# Patient Record
Sex: MALE | Race: WHITE | NOT HISPANIC OR LATINO | Employment: OTHER | URBAN - METROPOLITAN AREA
[De-identification: names, ages, dates, MRNs, and addresses within clinical notes are randomized per-mention and may not be internally consistent; named-entity substitution may affect disease eponyms.]

---

## 2018-03-24 ENCOUNTER — HOSPITAL ENCOUNTER (OUTPATIENT)
Facility: HOSPITAL | Age: 48
Discharge: HOME | End: 2018-03-25
Attending: EMERGENCY MEDICINE | Admitting: ORTHOPAEDIC SURGERY
Payer: COMMERCIAL

## 2018-03-24 DIAGNOSIS — S82.899A ANKLE FRACTURE: ICD-10-CM

## 2018-03-24 DIAGNOSIS — M25.571 ANKLE PAIN, RIGHT: Primary | ICD-10-CM

## 2018-03-24 DIAGNOSIS — S82.831A OTHER CLOSED FRACTURE OF PROXIMAL END OF RIGHT FIBULA, INITIAL ENCOUNTER: ICD-10-CM

## 2018-03-24 PROCEDURE — 82962 GLUCOSE BLOOD TEST: CPT

## 2018-03-24 PROCEDURE — 96374 THER/PROPH/DIAG INJ IV PUSH: CPT

## 2018-03-24 PROCEDURE — 96375 TX/PRO/DX INJ NEW DRUG ADDON: CPT

## 2018-03-24 PROCEDURE — 99285 EMERGENCY DEPT VISIT HI MDM: CPT | Mod: 25

## 2018-03-24 PROCEDURE — 63600175 PHARM REV CODE 636 W HCPCS: Performed by: EMERGENCY MEDICINE

## 2018-03-24 PROCEDURE — 27818 TREATMENT OF ANKLE FRACTURE: CPT

## 2018-03-24 PROCEDURE — 96361 HYDRATE IV INFUSION ADD-ON: CPT

## 2018-03-24 PROCEDURE — 25000003 PHARM REV CODE 250: Performed by: EMERGENCY MEDICINE

## 2018-03-24 RX ORDER — PROPOFOL 10 MG/ML
80 VIAL (ML) INTRAVENOUS ONCE
Status: COMPLETED | OUTPATIENT
Start: 2018-03-24 | End: 2018-03-24

## 2018-03-24 RX ORDER — LIDOCAINE HYDROCHLORIDE 10 MG/ML
20 INJECTION INFILTRATION; PERINEURAL
Status: COMPLETED | OUTPATIENT
Start: 2018-03-24 | End: 2018-03-24

## 2018-03-24 RX ADMIN — PROPOFOL 80 MG: 10 INJECTION, EMULSION INTRAVENOUS at 10:03

## 2018-03-24 RX ADMIN — LIDOCAINE HYDROCHLORIDE 20 ML: 10 INJECTION, SOLUTION INFILTRATION; PERINEURAL at 11:03

## 2018-03-25 VITALS
SYSTOLIC BLOOD PRESSURE: 135 MMHG | HEIGHT: 69 IN | BODY MASS INDEX: 26.66 KG/M2 | DIASTOLIC BLOOD PRESSURE: 81 MMHG | TEMPERATURE: 99 F | WEIGHT: 180 LBS | HEART RATE: 74 BPM | RESPIRATION RATE: 16 BRPM | OXYGEN SATURATION: 100 %

## 2018-03-25 PROBLEM — M25.571 ANKLE PAIN, RIGHT: Status: ACTIVE | Noted: 2018-03-25

## 2018-03-25 LAB
ANION GAP SERPL CALC-SCNC: 13 MMOL/L
BASOPHILS # BLD AUTO: 0.04 K/UL
BASOPHILS NFR BLD: 0.4 %
BUN SERPL-MCNC: 7 MG/DL
CALCIUM SERPL-MCNC: 9.1 MG/DL
CHLORIDE SERPL-SCNC: 109 MMOL/L
CO2 SERPL-SCNC: 22 MMOL/L
CREAT SERPL-MCNC: 0.9 MG/DL
DIFFERENTIAL METHOD: ABNORMAL
EOSINOPHIL # BLD AUTO: 0.2 K/UL
EOSINOPHIL NFR BLD: 1.7 %
ERYTHROCYTE [DISTWIDTH] IN BLOOD BY AUTOMATED COUNT: 14.8 %
EST. GFR  (AFRICAN AMERICAN): >60 ML/MIN/1.73 M^2
EST. GFR  (NON AFRICAN AMERICAN): >60 ML/MIN/1.73 M^2
GLUCOSE SERPL-MCNC: 96 MG/DL
HCT VFR BLD AUTO: 43.6 %
HGB BLD-MCNC: 14.3 G/DL
HIV1+2 IGG SERPL QL IA.RAPID: NEGATIVE
LACTATE SERPL-SCNC: 2.3 MMOL/L
LYMPHOCYTES # BLD AUTO: 3.3 K/UL
LYMPHOCYTES NFR BLD: 30.2 %
MCH RBC QN AUTO: 28.6 PG
MCHC RBC AUTO-ENTMCNC: 32.8 G/DL
MCV RBC AUTO: 87 FL
MONOCYTES # BLD AUTO: 0.6 K/UL
MONOCYTES NFR BLD: 5.8 %
NEUTROPHILS # BLD AUTO: 6.7 K/UL
NEUTROPHILS NFR BLD: 61.6 %
PLATELET # BLD AUTO: 190 K/UL
PMV BLD AUTO: 9.4 FL
POCT GLUCOSE: 78 MG/DL (ref 70–110)
POTASSIUM SERPL-SCNC: 4.2 MMOL/L
RBC # BLD AUTO: 5 M/UL
SODIUM SERPL-SCNC: 144 MMOL/L
WBC # BLD AUTO: 10.77 K/UL

## 2018-03-25 PROCEDURE — G0378 HOSPITAL OBSERVATION PER HR: HCPCS

## 2018-03-25 PROCEDURE — 85025 COMPLETE CBC W/AUTO DIFF WBC: CPT

## 2018-03-25 PROCEDURE — 97161 PT EVAL LOW COMPLEX 20 MIN: CPT

## 2018-03-25 PROCEDURE — 83605 ASSAY OF LACTIC ACID: CPT

## 2018-03-25 PROCEDURE — 97116 GAIT TRAINING THERAPY: CPT

## 2018-03-25 PROCEDURE — 80048 BASIC METABOLIC PNL TOTAL CA: CPT

## 2018-03-25 PROCEDURE — 25000003 PHARM REV CODE 250: Performed by: EMERGENCY MEDICINE

## 2018-03-25 PROCEDURE — G8987 SELF CARE CURRENT STATUS: HCPCS | Mod: CJ

## 2018-03-25 PROCEDURE — 86703 HIV-1/HIV-2 1 RESULT ANTBDY: CPT

## 2018-03-25 PROCEDURE — G8988 SELF CARE GOAL STATUS: HCPCS | Mod: CJ

## 2018-03-25 PROCEDURE — 63600175 PHARM REV CODE 636 W HCPCS: Performed by: EMERGENCY MEDICINE

## 2018-03-25 PROCEDURE — G8989 SELF CARE D/C STATUS: HCPCS | Mod: CJ

## 2018-03-25 PROCEDURE — 80074 ACUTE HEPATITIS PANEL: CPT

## 2018-03-25 PROCEDURE — 25000003 PHARM REV CODE 250: Performed by: STUDENT IN AN ORGANIZED HEALTH CARE EDUCATION/TRAINING PROGRAM

## 2018-03-25 PROCEDURE — 97165 OT EVAL LOW COMPLEX 30 MIN: CPT

## 2018-03-25 RX ORDER — SODIUM CHLORIDE, SODIUM LACTATE, POTASSIUM CHLORIDE, CALCIUM CHLORIDE 600; 310; 30; 20 MG/100ML; MG/100ML; MG/100ML; MG/100ML
INJECTION, SOLUTION INTRAVENOUS CONTINUOUS
Status: DISCONTINUED | OUTPATIENT
Start: 2018-03-25 | End: 2018-03-25 | Stop reason: HOSPADM

## 2018-03-25 RX ORDER — MORPHINE SULFATE 2 MG/ML
4 INJECTION, SOLUTION INTRAMUSCULAR; INTRAVENOUS EVERY 4 HOURS PRN
Status: DISCONTINUED | OUTPATIENT
Start: 2018-03-25 | End: 2018-03-25

## 2018-03-25 RX ORDER — SODIUM CHLORIDE 9 MG/ML
1000 INJECTION, SOLUTION INTRAVENOUS
Status: COMPLETED | OUTPATIENT
Start: 2018-03-25 | End: 2018-03-25

## 2018-03-25 RX ORDER — MORPHINE SULFATE 4 MG/ML
4 INJECTION, SOLUTION INTRAMUSCULAR; INTRAVENOUS EVERY 4 HOURS PRN
Status: DISCONTINUED | OUTPATIENT
Start: 2018-03-25 | End: 2018-03-25 | Stop reason: HOSPADM

## 2018-03-25 RX ORDER — FAMOTIDINE 20 MG/1
20 TABLET, FILM COATED ORAL 2 TIMES DAILY
Qty: 84 TABLET | Refills: 0 | Status: SHIPPED | OUTPATIENT
Start: 2018-03-25 | End: 2018-08-14

## 2018-03-25 RX ORDER — FAMOTIDINE 20 MG/1
20 TABLET, FILM COATED ORAL 2 TIMES DAILY
Status: DISCONTINUED | OUTPATIENT
Start: 2018-03-25 | End: 2018-03-25 | Stop reason: HOSPADM

## 2018-03-25 RX ORDER — ONDANSETRON 2 MG/ML
4 INJECTION INTRAMUSCULAR; INTRAVENOUS EVERY 8 HOURS PRN
Status: DISCONTINUED | OUTPATIENT
Start: 2018-03-25 | End: 2018-03-25 | Stop reason: HOSPADM

## 2018-03-25 RX ORDER — OXYCODONE AND ACETAMINOPHEN 5; 325 MG/1; MG/1
1 TABLET ORAL EVERY 4 HOURS PRN
Qty: 42 TABLET | Refills: 0 | Status: SHIPPED | OUTPATIENT
Start: 2018-03-25 | End: 2018-04-01

## 2018-03-25 RX ORDER — ASPIRIN 81 MG/1
81 TABLET ORAL 2 TIMES DAILY
Qty: 84 TABLET | Refills: 0 | Status: SHIPPED | OUTPATIENT
Start: 2018-03-25 | End: 2018-08-14

## 2018-03-25 RX ORDER — IBUPROFEN 200 MG
1 TABLET ORAL
Status: ACTIVE | OUTPATIENT
Start: 2018-03-25 | End: 2018-03-25

## 2018-03-25 RX ORDER — ENOXAPARIN SODIUM 100 MG/ML
40 INJECTION SUBCUTANEOUS EVERY 24 HOURS
Status: DISCONTINUED | OUTPATIENT
Start: 2018-03-25 | End: 2018-03-25 | Stop reason: HOSPADM

## 2018-03-25 RX ORDER — HYDROCODONE BITARTRATE AND ACETAMINOPHEN 5; 325 MG/1; MG/1
1 TABLET ORAL EVERY 4 HOURS PRN
Status: DISCONTINUED | OUTPATIENT
Start: 2018-03-25 | End: 2018-03-25 | Stop reason: HOSPADM

## 2018-03-25 RX ADMIN — HYDROCODONE BITARTRATE AND ACETAMINOPHEN 1 TABLET: 5; 325 TABLET ORAL at 07:03

## 2018-03-25 RX ADMIN — HYDROCODONE BITARTRATE AND ACETAMINOPHEN 1 TABLET: 5; 325 TABLET ORAL at 11:03

## 2018-03-25 RX ADMIN — SODIUM CHLORIDE, SODIUM LACTATE, POTASSIUM CHLORIDE, AND CALCIUM CHLORIDE: .6; .31; .03; .02 INJECTION, SOLUTION INTRAVENOUS at 03:03

## 2018-03-25 RX ADMIN — HYDROCODONE BITARTRATE AND ACETAMINOPHEN 1 TABLET: 5; 325 TABLET ORAL at 03:03

## 2018-03-25 RX ADMIN — MORPHINE SULFATE 4 MG: 4 INJECTION INTRAVENOUS at 01:03

## 2018-03-25 RX ADMIN — FAMOTIDINE 20 MG: 20 TABLET, FILM COATED ORAL at 10:03

## 2018-03-25 RX ADMIN — SODIUM CHLORIDE 1000 ML: 0.9 INJECTION, SOLUTION INTRAVENOUS at 02:03

## 2018-03-25 NOTE — PLAN OF CARE
Problem: Physical Therapy Goal  Goal: Physical Therapy Goal  Outcome: Outcome(s) achieved Date Met: 03/25/18  Patient see for eval and treatment; pt and S.O. instructed in use of crutches and RW; pt safer with RW and also recommended BSC and w/c with elevating leg rests for longer distances-pt will also be having surgery in the near future; pt being d/c'd with S.O.'s assistance.
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
## 2018-03-25 NOTE — PLAN OF CARE
Problem: Occupational Therapy Goal  Goal: Occupational Therapy Goal  Outcome: Outcome(s) achieved Date Met: 03/25/18   OT initial eval completed. Pt and S.O. Instructed in LB dressing tech; wc use for in home ADLS and mobility; NWB precautions RLE.  DC OT . Pt to DC home with S.O.
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
## 2018-03-25 NOTE — ED NOTES
Charted on paper charts. Received 4mg morphine iV at 0155. Received norco 5 at 0335. Pt appears to be resting at this time. Patient on cardiac monitor, automatic blood pressure cuff and pulse oximeter. NAD noted. Will continue to monitor.
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
## 2018-03-25 NOTE — ED NOTES
Pt's shoes, socks and pants removed. Swelling noted to right ankle. Pedal pulse is palpable and within normal limits

## 2018-03-25 NOTE — PT/OT/SLP EVAL
Occupational Therapy   Evaluation/Discharge Summary    Name: Arnie Barrientos  MRN: [id number]  Admitting Diagnosis:  Ankle pain, right      Recommendations:     Discharge Recommendations: home (with S.O.)  Discharge Equipment Recommendations:  walker, rolling, wheelchair, bedside commode  Barriers to discharge:  None    History:     Occupational Profile:  Living Environment: home with S.O in H with tub and no DME.  Previous level of function: indep ADLS; owns and works in landscaping   Roles and Routines: active  Equipment Owned:  none  Assistance upon Discharge: yes S.O.    History reviewed. No pertinent past medical history.    History reviewed. No pertinent surgical history.    Subjective     Chief Complaint: R ankle pain throbbing  Patient/Family stated goals: none  Communicated with: nurse prior to session.  Pain/Comfort:  · Pain Rating 1: 9/10  · Location - Side 1: Right  · Location - Orientation 1: generalized  · Location 1: ankle  · Pain Addressed 1: Pre-medicate for activity, Reposition, Distraction, Cessation of Activity, Nurse notified  · Pain Rating Post-Intervention 1: 9/10    Patients cultural, spiritual, Synagogue conflicts given the current situation: na    Objective:     Patient found with: telemetry    General Precautions: Standard, fall   Orthopedic Precautions:RLE non weight bearing   Braces:  (R ankle splinted )     Occupational Performance:    Bed Mobility:    · Patient completed Supine to Sit with stand by assistance    Functional Mobility/Transfers:  ·  declined 2/2 R ankle pain  · Functional Mobility: same    Activities of Daily Living:  · Feeding:  independence HOB elevated  · Grooming: modified independence HOB elevated  · LB Dressing: maximal assistance L sock    Cognitive/Visual Perceptual:  Cognitive/Psychosocial Skills:     -       Oriented to: Person, Place, Time and Situation   -       Follows Commands/attention:Follows one-step commands  -       Communication: clear/fluent  -        "Memory: No Deficits noted  -       Safety awareness/insight to disability: intact   -       Mood/Affect/Coping skills/emotional control: Anxious  Visual/Perceptual:      -Intact    Physical Exam:  Balance:    -       static standing;  NT 2/2 pain; long sitting good-  Postural examination/scapula alignment:    -       No postural abnormalities identified  Skin integrity: Visible skin intact  Edema:  None noted  Sensation:    -       Intact  Dominant hand:    -       right  Upper Extremity Range of Motion:     -       Right Upper Extremity: WFL  -       Left Upper Extremity: WFL  Upper Extremity Strength:    -       Right Upper Extremity: WFL  -       Left Upper Extremity: WFL    Patient left supine with all lines intact, call button in reach and nurse notified    [facility] 6 Click:  [facility] Total Score: 21    Treatment & Education:  Role of OT and POC. Pt and S.O. Instructed in LB dressing tech; wc use for in home ADLS and mobility; NWB precautions RLE.         Assessment:     Arnie aBrrientos is a 48 y.o. male with a medical diagnosis of Ankle pain, right.  He presents with R ankle pain. Rt ankle surgery deferred until r ankle swelling  Decreases. Pt. Is NWB to RLE.  DC home with S.O. And DME:  Wc, RW and BSC.  Performance deficits affecting function are pain, decreased lower extremity function, impaired balance, impaired functional mobilty, impaired self care skills, gait instability, decreased ROM, orthopedic precautions, impaired joint extensibility.      Rehab Prognosis:  good; patient would benefit from acute skilled OT services to address these deficits and reach maximum level of function.         Clinical Decision Makin.  OT Low:  "Pt evaluation falls under low complexity for evaluation coding due to performance deficits noted in 1-3 areas as stated above and 0 co-morbities affecting current functional status. Data obtained from problem focused assessments. No modifications or assistance was required for " "completion of evaluation. Only brief occupational profile and history review completed."     Plan:     Patient to be seen   to address the above listed problems via    · Plan of Care Expires:    · Plan of Care Reviewed with: patient, significant other    This Plan of care has been discussed with the patient who was involved in its development and understands and is in agreement with the identified goals and treatment plan    GOALS:    Occupational Therapy Goals     Not on file          Multidisciplinary Problems (Resolved)        Problem: Occupational Therapy Goal    Goal Priority Disciplines Outcome Interventions   Occupational Therapy Goal   (Resolved)     OT, PT/OT Outcome(s) achieved                    Time Tracking:     OT Date of Treatment: 03/25/18  OT Start Time: 1237  OT Stop Time: 1258  OT Total Time (min): 21 min    Billable Minutes:Evaluation 21  Total Time 21  COTX with PT    Belén Richardson OT  3/25/2018    "

## 2018-03-25 NOTE — ED NOTES
Physical therapy at bedside with pt. Physical therapy requests for pt to use rolling walker, wheelchair, and bedsdie commode at home.

## 2018-03-25 NOTE — ED NOTES
Boarder assessment completed. Pt appears to be resting . Respirations even and unlabored. Equal rise and fall of chest noted. Skin warm and dry. Pt easily aroused to verbal stimulation. Will continue to monitor.

## 2018-03-25 NOTE — ED NOTES
Pt placed in cardiac monitor, pulse ox monitoring, end tidal Co2 monitoring, bp cuff and O2 @ 2lpm via [place]. Sedation consent signed by patient. Significant other at bedside

## 2018-03-26 LAB
HAV IGM SERPL QL IA: NEGATIVE
HBV CORE IGM SERPL QL IA: NEGATIVE
HBV SURFACE AG SERPL QL IA: NEGATIVE
HCV AB SERPL QL IA: NEGATIVE

## 2018-03-26 NOTE — PT/OT/SLP EVAL
Physical Therapy Evaluation/Treatment/Discharge    Patient Name:  Arnie Barrientos   MRN:  [id number]    Recommendations:     Discharge Recommendations:  home (with significant other)   Discharge Equipment Recommendations: walker, rolling, bedside commode, wheelchair   Barriers to discharge: None    Assessment:     Arnie Barrientos is a 48 y.o. male admitted with a medical diagnosis of Ankle pain, right.  He presents with the following impairments/functional limitations:  pain, decreased lower extremity function, gait instability, decreased ROM, orthopedic precautions, impaired joint extensibility, impaired endurance, impaired functional mobilty, impaired self care skills, impaired balance pt and S.O. instructed in use of crutches and RW; pt safer with RW and also recommended BSC and w/c with elevating leg rests for longer distances-pt will also be having surgery in the near future; pt being d/c'd with S.O.'s assistance.      Recent Surgery: * No surgery found *      Plan:     During this hospitalization, patient to be seen  (n/a) to address the above listed problems via  (d/c acute PT services)  · Plan of Care Expires:      Plan of Care Reviewed with: patient, significant other    Subjective     Communicated with nurse prior to session.  Patient found with nurse trying to have pt amb with crutches upon PT entry to room, agreeable to evaluation.      Chief Complaint: R ankle pain, throbbing  Patient comments/goals: wants to get home  Pain/Comfort:  ·   Pain Rating 1: 9/10  · Location - Side 1: Right  · Location - Orientation 1: generalized  · Location 1: ankle  · Pain Addressed 1: Pre-medicate for activity, Reposition, Distraction, Cessation of Activity, Nurse notified  · Pain Rating Post-Intervention 1: 9/10    Patients cultural, spiritual, Anabaptism conflicts given the current situation: n/a    Living Environment:  Patient will be home with his significant other -[facility] with tub/shower combo and no DME  Prior to  admission, patients level of function was independent with amb, ADLs; 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  Patient has the following equipment: none.  Upon discharge, patient will have assistance from S.O..    Objective:     Patient found with: telemetry     General Precautions: Standard, fall   Orthopedic Precautions:RLE non weight bearing   Braces:       Exams:  · Cognitive Exam:  Patient is oriented to Person, Place, Time and Situation and follows 100% of one step commands   · Gross Motor Coordination:  impaired 2/2 pain; pt tremulous  · Postural Exam:  Patient presented with the following abnormalities:    · -       No postural abnormalities identified  · Skin Integrity/Edema:      · -       Skin integrity: Visible skin intact  · -       Edema: None noted    · RLE ROM: WFL  · RLE Strength: functional for amb, assist for lifting RLE onto bed  · LLE ROM: WFL  · LLE Strength: WFL    Functional Mobility:  · Bed Mobility:     · Supine to Sit: stand by assistance  · Sit to Supine: stand by assistance and occasional assist 2/2 pain  · Transfers:     · Sit to Stand:  stand by assistance/contact guard with axillary crutches; once LOB with CG/Anahi to recover  · Gait:  Patient amb 20ft x 3 with crutches; however, pt was tremulous and unsafe with use of crutches, balance tenuous; needed VCs for posture and unable to go up/down step at this time  · Balance:  Static sit good, dynamic sit good-, static stand fair+/fair with crutches, with amb /dynamic stand fair/fair- to occasional Anahi with crutches    AM-PAC 6 CLICK MOBILITY  Total Score:  17      Therapeutic Activities and Exercises:   Patient and S.O. Educated in role of PT/POC, NWB precautions, use of crutches with transfers and amb as above; pt and S.O. Educated on application and use of gt/txf belt to use going up/down step to get in home; also educated on chair method; pt then instructed on transfer and amb with RW; discussed home equipment; contacted CM to have equipment  ordered; educated pt and S.O. On active range of R knee and hip and general extremity ex for circulation, keeping range and strength; handout of walker instructions given.    Patient left supine with all lines intact, call button in reach and nurse notified.    GOALS:    Physical Therapy Goals     Not on file          Multidisciplinary Problems (Resolved)        Problem: Physical Therapy Goal    Goal Priority Disciplines Outcome Goal Variances Interventions   Physical Therapy Goal   (Resolved)     PT/OT, PT Outcome(s) achieved                     History:     History reviewed. No pertinent past medical history.    History reviewed. No pertinent surgical history.    Clinical Decision Making:     History  Co-morbidities and personal factors that may impact the plan of care Examination  Body Structures and Functions, activity limitations and participation restrictions that may impact the plan of care Clinical Presentation   Decision Making/ Complexity Score   Co-morbidities:   [] Time since onset of injury / illness / exacerbation  [x] Status of current condition  []Patient's cognitive status and safety concerns    [] Multiple Medical Problems (see med hx)  Personal Factors:   [] Patient's age  [] Prior Level of function   [] Patient's home situation (environment and family support)  [] Patient's level of motivation  [] Expected progression of patient      HISTORY:(criteria)    [] 88141 - no personal factors/history    [x] 85994 - has 1-2 personal factor/comorbidity     [] 13046 - has >3 personal factor/comorbidity     Body Regions:  [] Objective examination findings  [] Head     []  Neck  [] Trunk   [] Upper Extremity  [x] Lower Extremity    Body Systems:  [x] For communication ability, affect, cognition, language, and learning style: the assessment of the ability to make needs known, consciousness, orientation (person, place, and time), expected emotional /behavioral responses, and learning preferences (eg, learning  barriers, education  needs)  [x] For the neuromuscular system: a general assessment of gross coordinated movement (eg, balance, gait, locomotion, transfers, and transitions) and motor function  (motor control and motor learning)  [x] For the musculoskeletal system: the assessment of gross symmetry, gross range of motion, gross strength, height, and weight  [] For the integumentary system: the assessment of pliability(texture), presence of scar formation, skin color, and skin integrity  [] For cardiovascular/pulmonary system: the assessment of heart rate, respiratory rate, blood pressure, and edema     Activity limitations:    [] Patient's cognitive status and saf ety concerns          [x] Status of current condition      [] Weight bearing restriction  [] Cardiopulmunary Restriction    Participation Restrictions:   [] Goals and goal agreement with the patient     [] Rehab potential (prognosis) and probable outcome      Examination of Body System: (criteria)    [] 82358 - addressing 1-2 elements    [] 99852 - addressing a total of 3 or more elements     [x] 77022 -  Addressing a total of 4 or more elements         Clinical Presentation: (criteria)  Stable - 84528     On examination of body system using standardized tests and measures patient presents with 4 or more elements from any of the following: body structures and functions, activity limitations, and/or participation restrictions.  Leading to a clinical presentation that is considered stable and/or uncomplicated                              Clinical Decision Making  (Eval Complexity):  Choose One     Time Tracking:     PT Received On: 03/25/18  PT Start Time: 1140     PT Stop Time: 1258  PT Total Time (min): 78 min     Billable Minutes: Evaluation 15 minutes and Gait Training 30 minutes      Carla Dutta, PT  03/25/2018

## 2018-03-26 NOTE — PROGRESS NOTES
faxed durable medical equipment to [facility].   made phone contact with [facility]'s patient , Dolly to check status of order.  Dolly reported that patient does not have insurance.     informed patient's friend, En that patient does not have insurance.  She reported that patient has been paying [employer] for many years.  She reported that she left a copy of patient's insurance card.     faxed card to Dolly at [facility]. Dolly reported that the patient's name and date of birth does not match insurance.       made phone contact with friend and informed her patient need to make contact with his insurance because there is an error with the name and date of birth,

## 2018-03-27 ENCOUNTER — DOCUMENTATION ONLY (OUTPATIENT)
Dept: ORTHOPEDICS | Facility: CLINIC | Age: 48
End: 2018-03-27

## 2018-03-27 ENCOUNTER — TELEPHONE (OUTPATIENT)
Dept: ORTHOPEDICS | Facility: CLINIC | Age: 48
End: 2018-03-27
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
## 2018-03-27 NOTE — PROGRESS NOTES
Notified pt and spoke with his girlfriend; regarding appointment for skin check on 04/03/18 at 7:45 am/mailed.Patient girlfriend states verbal understanding and has no further questions.

## 2018-03-28 ENCOUNTER — TELEPHONE (OUTPATIENT)
Dept: ORTHOPEDICS | Facility: CLINIC | Age: 48
End: 2018-03-28

## 2018-03-28 NOTE — TELEPHONE ENCOUNTER
Spoke to Shanta and informed her that the appointment on 04/03 is for a skin check to make sure the patient is not too swollen to have sx and at that appointment the date of sx will be given and all other pertinent information will be given at the visit also. Shanta verbalized understanding.       ----- Message from Yen Parra sent at 3/28/2018 10:32 AM CDT -----  Contact: Chilo Womack  Ms. Womack is calling to speak with Staff because she says she has questions regarding the upcoming appt.    She can be reached at [phone].    Thank you.
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
## 2018-04-03 ENCOUNTER — OFFICE VISIT (OUTPATIENT)
Dept: ORTHOPEDICS | Facility: CLINIC | Age: 48
End: 2018-04-03
Payer: COMMERCIAL

## 2018-04-03 ENCOUNTER — PATIENT MESSAGE (OUTPATIENT)
Dept: ORTHOPEDICS | Facility: CLINIC | Age: 48
End: 2018-04-03

## 2018-04-03 ENCOUNTER — HOSPITAL ENCOUNTER (OUTPATIENT)
Dept: RADIOLOGY | Facility: HOSPITAL | Age: 48
Discharge: HOME | End: 2018-04-03
Attending: PHYSICIAN ASSISTANT
Payer: COMMERCIAL

## 2018-04-03 DIAGNOSIS — Z09 FRACTURE FOLLOW-UP: ICD-10-CM

## 2018-04-03 DIAGNOSIS — S82.871A CLOSED RIGHT PILON FRACTURE, INITIAL ENCOUNTER: Primary | ICD-10-CM

## 2018-04-03 DIAGNOSIS — M25.571 ACUTE RIGHT ANKLE PAIN: ICD-10-CM

## 2018-04-03 DIAGNOSIS — T14.8XXA FRACTURE: ICD-10-CM

## 2018-04-03 DIAGNOSIS — T14.8XXA FRACTURE: Primary | ICD-10-CM
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
## 2018-04-03 PROCEDURE — 99203 OFFICE O/P NEW LOW 30 MIN: CPT | Mod: S$GLB,,, | Performed by: PHYSICIAN ASSISTANT

## 2018-04-03 PROCEDURE — 73610 X-RAY EXAM OF ANKLE: CPT | Mod: TC,RT

## 2018-04-03 PROCEDURE — 73610 X-RAY EXAM OF ANKLE: CPT | Mod: 26,RT,, | Performed by: RADIOLOGY

## 2018-04-03 PROCEDURE — 99999 PR PBB SHADOW E&M-EST. PATIENT-LVL II: CPT | Mod: PBBFAC,,, | Performed by: PHYSICIAN ASSISTANT

## 2018-04-03 RX ORDER — OXYCODONE AND ACETAMINOPHEN 5; 325 MG/1; MG/1
1 TABLET ORAL EVERY 4 HOURS PRN
Qty: 28 TABLET | Refills: 0 | Status: ON HOLD | OUTPATIENT
Start: 2018-04-03 | End: 2018-04-06 | Stop reason: HOSPADM

## 2018-04-04 ENCOUNTER — TELEPHONE (OUTPATIENT)
Dept: ORTHOPEDICS | Facility: CLINIC | Age: 48
End: 2018-04-04

## 2018-04-04 DIAGNOSIS — Z09 FRACTURE FOLLOW-UP: Primary | ICD-10-CM

## 2018-04-04 RX ORDER — MUPIROCIN 20 MG/G
OINTMENT TOPICAL
Status: CANCELLED | OUTPATIENT
Start: 2018-04-04
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
## 2018-04-05 ENCOUNTER — ANESTHESIA EVENT (OUTPATIENT)
Dept: SURGERY | Facility: HOSPITAL | Age: 48
End: 2018-04-05
Payer: COMMERCIAL

## 2018-04-05 ENCOUNTER — TELEPHONE (OUTPATIENT)
Dept: ORTHOPEDICS | Facility: CLINIC | Age: 48
End: 2018-04-05
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
## 2018-04-05 NOTE — TELEPHONE ENCOUNTER
Spoke with pt.  Advised NPO after midnight.   Arrival time of 6 am tomorrow.  Pt will be iced and elevated while waiting for sx, per Dr Masterson.

## 2018-04-05 NOTE — PRE-PROCEDURE INSTRUCTIONS
Preop instructions: NPO after midnigh, shower instructions, directions, leave all valuables at home, medication instructions for PM prior & am of procedure explained. Juwangustavo stated an understanding.     Juwangustavo denies any side effects or issues with anesthesia or sedation.    Juwangustavo does not know arrival time. Explained that this information comes from the surgeons office and if they have not heard from them by 3pm to call office. Chilo stated an understanding.

## 2018-04-06 ENCOUNTER — SURGERY (OUTPATIENT)
Age: 48
End: 2018-04-06

## 2018-04-06 ENCOUNTER — ANESTHESIA (OUTPATIENT)
Dept: SURGERY | Facility: HOSPITAL | Age: 48
End: 2018-04-06
Payer: COMMERCIAL

## 2018-04-06 ENCOUNTER — PATIENT MESSAGE (OUTPATIENT)
Dept: SURGERY | Facility: HOSPITAL | Age: 48
End: 2018-04-06

## 2018-04-06 ENCOUNTER — HOSPITAL ENCOUNTER (OUTPATIENT)
Facility: HOSPITAL | Age: 48
Discharge: HOME | End: 2018-04-06
Attending: ORTHOPAEDIC SURGERY | Admitting: ORTHOPAEDIC SURGERY
Payer: COMMERCIAL

## 2018-04-06 VITALS
WEIGHT: 180 LBS | HEART RATE: 79 BPM | DIASTOLIC BLOOD PRESSURE: 74 MMHG | OXYGEN SATURATION: 99 % | BODY MASS INDEX: 26.66 KG/M2 | TEMPERATURE: 98 F | SYSTOLIC BLOOD PRESSURE: 110 MMHG | HEIGHT: 69 IN | RESPIRATION RATE: 17 BRPM

## 2018-04-06 DIAGNOSIS — T14.8XXA FRACTURE: ICD-10-CM

## 2018-04-06 DIAGNOSIS — S82.871A CLOSED RIGHT PILON FRACTURE, INITIAL ENCOUNTER: ICD-10-CM

## 2018-04-06 DIAGNOSIS — G89.18 POST-OP PAIN: Primary | ICD-10-CM

## 2018-04-06 PROCEDURE — 71000039 HC RECOVERY, EACH ADD'L HOUR: Performed by: ORTHOPAEDIC SURGERY

## 2018-04-06 PROCEDURE — 25000003 PHARM REV CODE 250: Performed by: STUDENT IN AN ORGANIZED HEALTH CARE EDUCATION/TRAINING PROGRAM

## 2018-04-06 PROCEDURE — 71000016 HC POSTOP RECOV ADDL HR: Performed by: ORTHOPAEDIC SURGERY

## 2018-04-06 PROCEDURE — 37000009 HC ANESTHESIA EA ADD 15 MINS: Performed by: ORTHOPAEDIC SURGERY

## 2018-04-06 PROCEDURE — 71000015 HC POSTOP RECOV 1ST HR: Performed by: ORTHOPAEDIC SURGERY

## 2018-04-06 PROCEDURE — C1769 GUIDE WIRE: HCPCS | Performed by: ORTHOPAEDIC SURGERY

## 2018-04-06 PROCEDURE — 27827 TREAT LOWER LEG FRACTURE: CPT | Mod: RT,,, | Performed by: ORTHOPAEDIC SURGERY

## 2018-04-06 PROCEDURE — 64447 NJX AA&/STRD FEMORAL NRV IMG: CPT | Performed by: ANESTHESIOLOGY

## 2018-04-06 PROCEDURE — 64445 NJX AA&/STRD SCIATIC NRV IMG: CPT | Mod: 59,RT,, | Performed by: ANESTHESIOLOGY

## 2018-04-06 PROCEDURE — 71000033 HC RECOVERY, INTIAL HOUR: Performed by: ORTHOPAEDIC SURGERY

## 2018-04-06 PROCEDURE — 76942 ECHO GUIDE FOR BIOPSY: CPT | Mod: 26,,, | Performed by: ANESTHESIOLOGY

## 2018-04-06 PROCEDURE — 63600175 PHARM REV CODE 636 W HCPCS: Performed by: STUDENT IN AN ORGANIZED HEALTH CARE EDUCATION/TRAINING PROGRAM

## 2018-04-06 PROCEDURE — C1713 ANCHOR/SCREW BN/BN,TIS/BN: HCPCS | Performed by: ORTHOPAEDIC SURGERY

## 2018-04-06 PROCEDURE — 37000008 HC ANESTHESIA 1ST 15 MINUTES: Performed by: ORTHOPAEDIC SURGERY

## 2018-04-06 PROCEDURE — 36000710: Performed by: ORTHOPAEDIC SURGERY

## 2018-04-06 PROCEDURE — 25000003 PHARM REV CODE 250: Performed by: ANESTHESIOLOGY

## 2018-04-06 PROCEDURE — 36000711: Performed by: ORTHOPAEDIC SURGERY

## 2018-04-06 PROCEDURE — 27201423 OPTIME MED/SURG SUP & DEVICES STERILE SUPPLY: Performed by: ORTHOPAEDIC SURGERY

## 2018-04-06 PROCEDURE — D9220A PRA ANESTHESIA: Mod: ,,, | Performed by: ANESTHESIOLOGY

## 2018-04-06 PROCEDURE — 63600175 PHARM REV CODE 636 W HCPCS: Performed by: PHYSICIAN ASSISTANT

## 2018-04-06 PROCEDURE — 25000003 PHARM REV CODE 250: Performed by: PHYSICIAN ASSISTANT

## 2018-04-06 DEVICE — SCREW CRTX LOW PROFILE H 36MM: Type: IMPLANTABLE DEVICE | Site: ANKLE | Status: FUNCTIONAL

## 2018-04-06 DEVICE — SCREW CRTX LOW PROFILE H 38MM: Type: IMPLANTABLE DEVICE | Site: ANKLE | Status: FUNCTIONAL

## 2018-04-06 DEVICE — SCREW CORTEX 3.5 45M: Type: IMPLANTABLE DEVICE | Site: ANKLE | Status: FUNCTIONAL

## 2018-04-06 DEVICE — K-WIRE TRCR PT1.6MM DIA 150MM: Type: IMPLANTABLE DEVICE | Site: ANKLE | Status: FUNCTIONAL

## 2018-04-06 DEVICE — SCREW CRTX LOW PROFILE H 80MM: Type: IMPLANTABLE DEVICE | Site: ANKLE | Status: FUNCTIONAL

## 2018-04-06 DEVICE — PLATE TUBULAR 1/3 85MM 7HOLE: Type: IMPLANTABLE DEVICE | Site: ANKLE | Status: FUNCTIONAL

## 2018-04-06 DEVICE — IMPLANTABLE DEVICE: Type: IMPLANTABLE DEVICE | Site: ANKLE | Status: FUNCTIONAL

## 2018-04-06 RX ORDER — PHENYLEPHRINE HYDROCHLORIDE 10 MG/ML
INJECTION INTRAVENOUS
Status: DISCONTINUED | OUTPATIENT
Start: 2018-04-06 | End: 2018-04-06

## 2018-04-06 RX ORDER — OXYCODONE HYDROCHLORIDE 5 MG/1
10 TABLET ORAL EVERY 4 HOURS PRN
Status: DISCONTINUED | OUTPATIENT
Start: 2018-04-06 | End: 2018-04-06 | Stop reason: HOSPADM

## 2018-04-06 RX ORDER — ONDANSETRON 8 MG/1
8 TABLET, ORALLY DISINTEGRATING ORAL EVERY 8 HOURS PRN
Status: DISCONTINUED | OUTPATIENT
Start: 2018-04-06 | End: 2018-04-06 | Stop reason: HOSPADM

## 2018-04-06 RX ORDER — NEOSTIGMINE METHYLSULFATE 1 MG/ML
INJECTION, SOLUTION INTRAVENOUS
Status: DISCONTINUED | OUTPATIENT
Start: 2018-04-06 | End: 2018-04-06

## 2018-04-06 RX ORDER — POLYETHYLENE GLYCOL 3350 17 G/17G
17 POWDER, FOR SOLUTION ORAL DAILY
Qty: 1530 G | Refills: 0 | Status: SHIPPED | OUTPATIENT
Start: 2018-04-06 | End: 2018-08-14

## 2018-04-06 RX ORDER — VANCOMYCIN/0.9 % SOD CHLORIDE 1 G/100 ML
1000 PLASTIC BAG, INJECTION (ML) INTRAVENOUS
Status: COMPLETED | OUTPATIENT
Start: 2018-04-06 | End: 2018-04-06

## 2018-04-06 RX ORDER — SODIUM CHLORIDE 9 MG/ML
INJECTION, SOLUTION INTRAVENOUS CONTINUOUS
Status: DISCONTINUED | OUTPATIENT
Start: 2018-04-06 | End: 2018-04-06 | Stop reason: HOSPADM

## 2018-04-06 RX ORDER — MIDAZOLAM HYDROCHLORIDE 1 MG/ML
0.5 INJECTION INTRAMUSCULAR; INTRAVENOUS EVERY 5 MIN PRN
Status: DISCONTINUED | OUTPATIENT
Start: 2018-04-06 | End: 2018-04-06 | Stop reason: HOSPADM

## 2018-04-06 RX ORDER — LIDOCAINE HYDROCHLORIDE 10 MG/ML
1 INJECTION, SOLUTION EPIDURAL; INFILTRATION; INTRACAUDAL; PERINEURAL ONCE
Status: DISCONTINUED | OUTPATIENT
Start: 2018-04-06 | End: 2018-04-06 | Stop reason: HOSPADM

## 2018-04-06 RX ORDER — GLYCOPYRROLATE 0.2 MG/ML
INJECTION INTRAMUSCULAR; INTRAVENOUS
Status: DISCONTINUED | OUTPATIENT
Start: 2018-04-06 | End: 2018-04-06

## 2018-04-06 RX ORDER — OXYCODONE AND ACETAMINOPHEN 10; 325 MG/1; MG/1
1 TABLET ORAL
Qty: 42 TABLET | Refills: 0 | Status: SHIPPED | OUTPATIENT
Start: 2018-04-06 | End: 2018-04-13

## 2018-04-06 RX ORDER — FENTANYL CITRATE 50 UG/ML
INJECTION, SOLUTION INTRAMUSCULAR; INTRAVENOUS
Status: DISCONTINUED | OUTPATIENT
Start: 2018-04-06 | End: 2018-04-06

## 2018-04-06 RX ORDER — OXYCODONE HYDROCHLORIDE 5 MG/1
5 TABLET ORAL EVERY 4 HOURS PRN
Status: DISCONTINUED | OUTPATIENT
Start: 2018-04-06 | End: 2018-04-06 | Stop reason: HOSPADM

## 2018-04-06 RX ORDER — PROMETHAZINE HYDROCHLORIDE 25 MG/1
25 TABLET ORAL EVERY 6 HOURS PRN
Status: DISCONTINUED | OUTPATIENT
Start: 2018-04-06 | End: 2018-04-06 | Stop reason: HOSPADM

## 2018-04-06 RX ORDER — MIDAZOLAM HYDROCHLORIDE 5 MG/ML
INJECTION INTRAMUSCULAR; INTRAVENOUS
Status: DISCONTINUED | OUTPATIENT
Start: 2018-04-06 | End: 2018-04-06

## 2018-04-06 RX ORDER — ROCURONIUM BROMIDE 10 MG/ML
INJECTION, SOLUTION INTRAVENOUS
Status: DISCONTINUED | OUTPATIENT
Start: 2018-04-06 | End: 2018-04-06

## 2018-04-06 RX ORDER — LIDOCAINE HCL/PF 100 MG/5ML
SYRINGE (ML) INTRAVENOUS
Status: DISCONTINUED | OUTPATIENT
Start: 2018-04-06 | End: 2018-04-06

## 2018-04-06 RX ORDER — PROPOFOL 10 MG/ML
VIAL (ML) INTRAVENOUS
Status: DISCONTINUED | OUTPATIENT
Start: 2018-04-06 | End: 2018-04-06

## 2018-04-06 RX ORDER — FENTANYL CITRATE 50 UG/ML
25 INJECTION, SOLUTION INTRAMUSCULAR; INTRAVENOUS EVERY 5 MIN PRN
Status: DISCONTINUED | OUTPATIENT
Start: 2018-04-06 | End: 2018-04-06 | Stop reason: HOSPADM

## 2018-04-06 RX ORDER — DOCUSATE SODIUM 100 MG/1
100 CAPSULE, LIQUID FILLED ORAL 3 TIMES DAILY
Qty: 90 CAPSULE | Refills: 0 | Status: SHIPPED | OUTPATIENT
Start: 2018-04-06 | End: 2018-08-14

## 2018-04-06 RX ORDER — MUPIROCIN 20 MG/G
OINTMENT TOPICAL
Status: DISCONTINUED | OUTPATIENT
Start: 2018-04-06 | End: 2018-04-06 | Stop reason: HOSPADM

## 2018-04-06 RX ORDER — ONDANSETRON 8 MG/1
8 TABLET, ORALLY DISINTEGRATING ORAL EVERY 8 HOURS PRN
Qty: 42 TABLET | Refills: 0 | Status: SHIPPED | OUTPATIENT
Start: 2018-04-06 | End: 2018-08-14

## 2018-04-06 RX ORDER — ACETAMINOPHEN 10 MG/ML
INJECTION, SOLUTION INTRAVENOUS
Status: DISCONTINUED | OUTPATIENT
Start: 2018-04-06 | End: 2018-04-06

## 2018-04-06 RX ORDER — ACETAMINOPHEN 325 MG/1
650 TABLET ORAL EVERY 4 HOURS PRN
Status: DISCONTINUED | OUTPATIENT
Start: 2018-04-06 | End: 2018-04-06 | Stop reason: HOSPADM

## 2018-04-06 RX ORDER — ONDANSETRON 2 MG/ML
INJECTION INTRAMUSCULAR; INTRAVENOUS
Status: DISCONTINUED | OUTPATIENT
Start: 2018-04-06 | End: 2018-04-06

## 2018-04-06 RX ADMIN — PHENYLEPHRINE HYDROCHLORIDE 100 MCG: 10 INJECTION INTRAVENOUS at 09:04

## 2018-04-06 RX ADMIN — ROCURONIUM BROMIDE 40 MG: 10 INJECTION, SOLUTION INTRAVENOUS at 08:04

## 2018-04-06 RX ADMIN — ROCURONIUM BROMIDE 10 MG: 10 INJECTION, SOLUTION INTRAVENOUS at 10:04

## 2018-04-06 RX ADMIN — LIDOCAINE HYDROCHLORIDE 100 MG: 20 INJECTION, SOLUTION INTRAVENOUS at 08:04

## 2018-04-06 RX ADMIN — ROCURONIUM BROMIDE 10 MG: 10 INJECTION, SOLUTION INTRAVENOUS at 09:04

## 2018-04-06 RX ADMIN — FENTANYL CITRATE 50 MCG: 50 INJECTION, SOLUTION INTRAMUSCULAR; INTRAVENOUS at 08:04

## 2018-04-06 RX ADMIN — MUPIROCIN: 20 OINTMENT TOPICAL at 08:04

## 2018-04-06 RX ADMIN — ONDANSETRON 4 MG: 2 INJECTION INTRAMUSCULAR; INTRAVENOUS at 11:04

## 2018-04-06 RX ADMIN — GLYCOPYRROLATE 0.6 MG: 0.2 INJECTION, SOLUTION INTRAMUSCULAR; INTRAVENOUS at 11:04

## 2018-04-06 RX ADMIN — FENTANYL CITRATE 25 MCG: 50 INJECTION, SOLUTION INTRAMUSCULAR; INTRAVENOUS at 10:04

## 2018-04-06 RX ADMIN — SODIUM CHLORIDE: 0.9 INJECTION, SOLUTION INTRAVENOUS at 07:04

## 2018-04-06 RX ADMIN — NEOSTIGMINE METHYLSULFATE 5 MG: 1 INJECTION INTRAVENOUS at 11:04

## 2018-04-06 RX ADMIN — SODIUM CHLORIDE, SODIUM GLUCONATE, SODIUM ACETATE, POTASSIUM CHLORIDE, MAGNESIUM CHLORIDE, SODIUM PHOSPHATE, DIBASIC, AND POTASSIUM PHOSPHATE: .53; .5; .37; .037; .03; .012; .00082 INJECTION, SOLUTION INTRAVENOUS at 10:04

## 2018-04-06 RX ADMIN — PROPOFOL 150 MG: 10 INJECTION, EMULSION INTRAVENOUS at 08:04

## 2018-04-06 RX ADMIN — FENTANYL CITRATE 50 MCG: 50 INJECTION, SOLUTION INTRAMUSCULAR; INTRAVENOUS at 10:04

## 2018-04-06 RX ADMIN — FENTANYL CITRATE 100 MCG: 50 INJECTION, SOLUTION INTRAMUSCULAR; INTRAVENOUS at 08:04

## 2018-04-06 RX ADMIN — Medication 1 G: at 08:04

## 2018-04-06 RX ADMIN — MIDAZOLAM 2 MG: 5 INJECTION INTRAMUSCULAR; INTRAVENOUS at 08:04

## 2018-04-06 RX ADMIN — PROPOFOL 50 MG: 10 INJECTION, EMULSION INTRAVENOUS at 08:04

## 2018-04-06 RX ADMIN — MIDAZOLAM HYDROCHLORIDE 2 MG: 1 INJECTION, SOLUTION INTRAMUSCULAR; INTRAVENOUS at 08:04

## 2018-04-06 RX ADMIN — ACETAMINOPHEN 1000 MG: 10 INJECTION, SOLUTION INTRAVENOUS at 10:04

## 2018-04-06 RX ADMIN — OXYCODONE HYDROCHLORIDE 10 MG: 5 TABLET ORAL at 12:04

## 2018-04-06 RX ADMIN — PHENYLEPHRINE HYDROCHLORIDE 100 MCG: 10 INJECTION INTRAVENOUS at 08:04

## 2018-04-06 NOTE — TRANSFER OF CARE
"Anesthesia Transfer of Care Note    Patient: Arnie Barrientos III    Procedure(s) Performed: Procedure(s) (LRB):  OPEN REDUCTION INTERNAL FIXATION- PILON (Right)    Patient location: PACU    Anesthesia Type: general    Transport from OR: Transported from OR on 6-10 L/min O2 by face mask with adequate spontaneous ventilation    Post pain: adequate analgesia    Post assessment: no apparent anesthetic complications    Post vital signs: stable    Level of consciousness: awake, alert and oriented    Nausea/Vomiting: no nausea/vomiting    Complications: none    Transfer of care protocol was followed      Last vitals:   Visit Vitals  /66   Pulse 77   Temp 36.7 °C (98.1 °F) (Temporal)   Resp 16   Ht 5' 9" (1.753 m)   Wt 81.6 kg (180 lb)   SpO2 100%   BMI 26.58 kg/m²     "

## 2018-04-06 NOTE — ANESTHESIA RELEASE NOTE
"Anesthesia Release from PACU Note    Patient: Arnie Barrientos III    Procedure(s) Performed: Procedure(s) (LRB):  OPEN REDUCTION INTERNAL FIXATION- PILON (Right)    Anesthesia type: general    Post pain: Adequate analgesia    Post assessment: no apparent anesthetic complications    Last Vitals:   Visit Vitals  /60   Pulse 82   Temp 36.8 °C (98.2 °F) (Oral)   Resp 18   Ht 5' 9" (1.753 m)   Wt 81.6 kg (180 lb)   SpO2 98%   BMI 26.58 kg/m²       Post vital signs: stable    Level of consciousness: awake    Nausea/Vomiting: no nausea/no vomiting    Complications: none    Airway Patency: patent    Respiratory: unassisted    Cardiovascular: stable and blood pressure at baseline    Hydration: euvolemic  "

## 2018-04-06 NOTE — ANESTHESIA POSTPROCEDURE EVALUATION
"Anesthesia Post Evaluation    Patient: Arnie Barrientos III    Procedure(s) Performed: Procedure(s) (LRB):  OPEN REDUCTION INTERNAL FIXATION- PILON (Right)    Final Anesthesia Type: general  Patient location during evaluation: PACU  Patient participation: Yes- Able to Participate  Level of consciousness: awake and alert  Post-procedure vital signs: reviewed and stable  Pain management: adequate  Airway patency: patent  PONV status at discharge: No PONV  Anesthetic complications: no      Cardiovascular status: blood pressure returned to baseline  Respiratory status: unassisted  Hydration status: euvolemic  Follow-up not needed.        Visit Vitals  /60   Pulse 82   Temp 36.8 °C (98.2 °F) (Oral)   Resp 18   Ht 5' 9" (1.753 m)   Wt 81.6 kg (180 lb)   SpO2 98%   BMI 26.58 kg/m²       Pain/Nishant Score: Pain Assessment Performed: Yes (4/6/2018  1:15 PM)  Presence of Pain: denies (4/6/2018  1:15 PM)  Pain Rating Prior to Med Admin: 0 (4/6/2018 12:25 PM)  Nishant Score: 8 (4/6/2018  1:15 PM)      "
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
## 2018-04-06 NOTE — ANESTHESIA PROCEDURE NOTES
Adductor Canal Single Injection Block    Patient location during procedure: pre-op   Block not for primary anesthetic.  Reason for block: at surgeon's request and post-op pain management   Post-op Pain Location: R ankle pain  Start time: 4/6/2018 8:15 AM  Timeout: 4/6/2018 8:03 AM   End time: 4/6/2018 8:20 AM  Staffing  Anesthesiologist: LUCY MEZA  Resident/CRNA: DAJUAN VALDEZ  Performed: resident/CRNA   Preanesthetic Checklist  Completed: patient identified, site marked, surgical consent, pre-op evaluation, timeout performed, IV checked, risks and benefits discussed and monitors and equipment checked  Peripheral Block  Patient position: supine  Prep: ChloraPrep  Patient monitoring: heart rate, cardiac monitor, continuous pulse ox, continuous capnometry and frequent blood pressure checks  Block type: adductor canal  Laterality: right  Injection technique: single shot  Needle  Needle type: Stimuplex   Needle gauge: 21 G  Needle length: 4 in  Needle localization: anatomical landmarks and ultrasound guidance   -ultrasound image captured on disc.  Assessment  Injection assessment: negative aspiration, negative parasthesia and local visualized surrounding nerve  Paresthesia pain: none  Heart rate change: no  Slow fractionated injection: yes  Medications:  Bolus administered: 10 mL of 0.5 ropivacaine  Epinephrine added: 3.75 mcg/mL (1/300,000)  Additional Notes  VSS.  [facility] RN monitoring vitals throughout procedure.  Patient tolerated procedure well.
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
## 2018-04-06 NOTE — ANESTHESIA PROCEDURE NOTES
Popliteal Sciatic Single Injection Block    Patient location during procedure: pre-op   Block not for primary anesthetic.  Reason for block: at surgeon's request and post-op pain management   Post-op Pain Location: R elida pain  Start time: 4/6/2018 8:10 AM  Timeout: 4/6/2018 8:03 AM   End time: 4/6/2018 8:15 AM  Staffing  Anesthesiologist: LUCY MEZA  Resident/CRNA: DAJUAN VALDEZ  Performed: resident/CRNA   Preanesthetic Checklist  Completed: patient identified, site marked, surgical consent, pre-op evaluation, timeout performed, IV checked, risks and benefits discussed and monitors and equipment checked  Peripheral Block  Patient position: supine  Prep: ChloraPrep  Patient monitoring: heart rate, cardiac monitor, continuous pulse ox, continuous capnometry and frequent blood pressure checks  Block type: popliteal  Laterality: right  Injection technique: single shot  Needle  Needle type: Stimuplex   Needle gauge: 21 G  Needle length: 4 in  Needle localization: anatomical landmarks and ultrasound guidance   -ultrasound image captured on disc.  Assessment  Injection assessment: negative aspiration, negative parasthesia and local visualized surrounding nerve  Paresthesia pain: none  Heart rate change: no  Slow fractionated injection: yes  Medications:  Bolus administered: 30 mL of 0.5 ropivacaine  Epinephrine added: 3.75 mcg/mL (1/300,000)  Additional Notes  VSS.  [facility] RN monitoring vitals throughout procedure.  Patient tolerated procedure well.

## 2018-04-06 NOTE — PLAN OF CARE
.VSS. Patient denies pain, block performed preop.  No N&V. RLE with splint in place, ice and elevated. Pt transferred to Phase II of recovery.

## 2018-04-06 NOTE — BRIEF OP NOTE
BRIEF OP NOTE    Preop Dx: Right tibial pilon fracture    Postop Dx: Right tibial pilon fracture    Procedure: Open treatment of right tibial pilon fracture with reduction and internal fixation, without fibula -     Surgeon: Matt Masterson M.D.    Asst:  Casey Pisano M.D    Anesthesia: GETA    EBL:  10cc    IVF:  2000cc crystalloid    Implants: Synthes small fragment    Specimens: None    Findings: Stable fixation.    Dispo:  To PACU extubated/stable     Dict#  [id number]      BRIEF DISCHARGE SUMMARY    Admit Date:   04/06/2018    D/C Date:  04/06/2018    D/C to:   Home with family    Diagnosis:  Right tibial pilon fracture    Procdure:  ORIF right pilon    Hospital Course: Uncomplicated outpatient surgery.    Condition:  Good    Diet:   Regular    Activity:  NWB RLE past knee    Medications:  Percocet 10/325    Follow up:  As scheduled in clinic.

## 2018-04-06 NOTE — INTERVAL H&P NOTE
No significant change in H&P.  Right tibial pilon fracture without fibula.  DOI is 3/24/18.  He came to us only the other day.  I explained that this far out, if his swelling is too great for medial fixation, I will ex fix to get better control and ability to ice.  If the skin is ready, I will fix the fracture definitively.  The risks, benefits and alternatives to surgery were discussed with the patient at great length.  These include bleeding, infection, vessel/nerve damage, pain, numbness, tingling, complex regional pain syndrome, hardware/surgical failure, need for further surgery, malunion, nonunion, stiffness, DVT, PE, arthritis and death.  Patient states an understanding and wishes to proceed with surgery.   All questions were answered.  No guarantees were implied or stated.  Informed consent was obtained.    Matt Masterson MD

## 2018-04-07 ENCOUNTER — HOSPITAL ENCOUNTER (OUTPATIENT)
Facility: HOSPITAL | Age: 48
Discharge: HOME | End: 2018-04-08
Attending: EMERGENCY MEDICINE | Admitting: ORTHOPAEDIC SURGERY
Payer: COMMERCIAL

## 2018-04-07 DIAGNOSIS — G89.18 POST-OPERATIVE PAIN: Primary | ICD-10-CM

## 2018-04-07 PROCEDURE — 25000003 PHARM REV CODE 250: Performed by: ORTHOPAEDIC SURGERY

## 2018-04-07 PROCEDURE — 25000003 PHARM REV CODE 250: Performed by: EMERGENCY MEDICINE

## 2018-04-07 PROCEDURE — 63600175 PHARM REV CODE 636 W HCPCS: Performed by: ORTHOPAEDIC SURGERY

## 2018-04-07 PROCEDURE — 25000003 PHARM REV CODE 250: Performed by: STUDENT IN AN ORGANIZED HEALTH CARE EDUCATION/TRAINING PROGRAM

## 2018-04-07 PROCEDURE — 63600175 PHARM REV CODE 636 W HCPCS: Performed by: EMERGENCY MEDICINE

## 2018-04-07 PROCEDURE — 99284 EMERGENCY DEPT VISIT MOD MDM: CPT | Mod: 25

## 2018-04-07 PROCEDURE — 94761 N-INVAS EAR/PLS OXIMETRY MLT: CPT

## 2018-04-07 PROCEDURE — 96374 THER/PROPH/DIAG INJ IV PUSH: CPT

## 2018-04-07 PROCEDURE — G0378 HOSPITAL OBSERVATION PER HR: HCPCS

## 2018-04-07 PROCEDURE — S4991 NICOTINE PATCH NONLEGEND: HCPCS | Performed by: STUDENT IN AN ORGANIZED HEALTH CARE EDUCATION/TRAINING PROGRAM

## 2018-04-07 PROCEDURE — 99285 EMERGENCY DEPT VISIT HI MDM: CPT | Mod: ,,, | Performed by: EMERGENCY MEDICINE

## 2018-04-07 RX ORDER — MORPHINE SULFATE 4 MG/ML
8 INJECTION, SOLUTION INTRAMUSCULAR; INTRAVENOUS
Status: COMPLETED | OUTPATIENT
Start: 2018-04-07 | End: 2018-04-07

## 2018-04-07 RX ORDER — PREGABALIN 75 MG/1
75 CAPSULE ORAL 2 TIMES DAILY
Status: DISCONTINUED | OUTPATIENT
Start: 2018-04-07 | End: 2018-04-08 | Stop reason: HOSPADM

## 2018-04-07 RX ORDER — MORPHINE SULFATE 4 MG/ML
4 INJECTION, SOLUTION INTRAMUSCULAR; INTRAVENOUS EVERY 4 HOURS PRN
Status: DISCONTINUED | OUTPATIENT
Start: 2018-04-07 | End: 2018-04-07

## 2018-04-07 RX ORDER — IBUPROFEN 200 MG
1 TABLET ORAL DAILY
Status: DISCONTINUED | OUTPATIENT
Start: 2018-04-07 | End: 2018-04-08 | Stop reason: HOSPADM

## 2018-04-07 RX ORDER — OXYCODONE HYDROCHLORIDE 5 MG/1
10 TABLET ORAL
Status: DISCONTINUED | OUTPATIENT
Start: 2018-04-07 | End: 2018-04-08 | Stop reason: HOSPADM

## 2018-04-07 RX ORDER — CYCLOBENZAPRINE HCL 5 MG
5 TABLET ORAL 3 TIMES DAILY PRN
Status: DISCONTINUED | OUTPATIENT
Start: 2018-04-07 | End: 2018-04-07

## 2018-04-07 RX ORDER — MORPHINE SULFATE 4 MG/ML
4 INJECTION, SOLUTION INTRAMUSCULAR; INTRAVENOUS
Status: DISCONTINUED | OUTPATIENT
Start: 2018-04-07 | End: 2018-04-08 | Stop reason: HOSPADM

## 2018-04-07 RX ORDER — ACETAMINOPHEN 10 MG/ML
1000 INJECTION, SOLUTION INTRAVENOUS EVERY 8 HOURS
Status: COMPLETED | OUTPATIENT
Start: 2018-04-07 | End: 2018-04-08

## 2018-04-07 RX ORDER — DIAZEPAM 5 MG/1
5 TABLET ORAL EVERY 6 HOURS PRN
Status: DISCONTINUED | OUTPATIENT
Start: 2018-04-07 | End: 2018-04-08 | Stop reason: HOSPADM

## 2018-04-07 RX ORDER — OXYCODONE HYDROCHLORIDE 5 MG/1
10 TABLET ORAL EVERY 4 HOURS PRN
Status: DISCONTINUED | OUTPATIENT
Start: 2018-04-07 | End: 2018-04-07

## 2018-04-07 RX ORDER — ASPIRIN 81 MG/1
81 TABLET ORAL 2 TIMES DAILY
Status: DISCONTINUED | OUTPATIENT
Start: 2018-04-07 | End: 2018-04-08 | Stop reason: HOSPADM

## 2018-04-07 RX ORDER — OXYCODONE HYDROCHLORIDE 5 MG/1
15 TABLET ORAL EVERY 4 HOURS PRN
Status: DISCONTINUED | OUTPATIENT
Start: 2018-04-07 | End: 2018-04-07

## 2018-04-07 RX ORDER — DOCUSATE SODIUM 100 MG/1
100 CAPSULE, LIQUID FILLED ORAL 3 TIMES DAILY
Status: DISCONTINUED | OUTPATIENT
Start: 2018-04-07 | End: 2018-04-08 | Stop reason: HOSPADM

## 2018-04-07 RX ORDER — PREGABALIN 75 MG/1
75 CAPSULE ORAL 2 TIMES DAILY
Status: DISCONTINUED | OUTPATIENT
Start: 2018-04-07 | End: 2018-04-07

## 2018-04-07 RX ORDER — OXYCODONE HYDROCHLORIDE 5 MG/1
15 TABLET ORAL
Status: DISCONTINUED | OUTPATIENT
Start: 2018-04-07 | End: 2018-04-08 | Stop reason: HOSPADM

## 2018-04-07 RX ORDER — MORPHINE SULFATE 4 MG/ML
3 INJECTION, SOLUTION INTRAMUSCULAR; INTRAVENOUS EVERY 4 HOURS PRN
Status: DISCONTINUED | OUTPATIENT
Start: 2018-04-07 | End: 2018-04-07

## 2018-04-07 RX ORDER — SODIUM CHLORIDE 0.9 % (FLUSH) 0.9 %
5 SYRINGE (ML) INJECTION
Status: DISCONTINUED | OUTPATIENT
Start: 2018-04-07 | End: 2018-04-08 | Stop reason: HOSPADM

## 2018-04-07 RX ORDER — ONDANSETRON 8 MG/1
8 TABLET, ORALLY DISINTEGRATING ORAL EVERY 8 HOURS PRN
Status: DISCONTINUED | OUTPATIENT
Start: 2018-04-07 | End: 2018-04-08 | Stop reason: HOSPADM

## 2018-04-07 RX ORDER — FAMOTIDINE 20 MG/1
20 TABLET, FILM COATED ORAL 2 TIMES DAILY
Status: DISCONTINUED | OUTPATIENT
Start: 2018-04-07 | End: 2018-04-08 | Stop reason: HOSPADM

## 2018-04-07 RX ORDER — MUPIROCIN 20 MG/G
1 OINTMENT TOPICAL 2 TIMES DAILY
Status: DISCONTINUED | OUTPATIENT
Start: 2018-04-07 | End: 2018-04-07

## 2018-04-07 RX ORDER — OXYCODONE HCL 10 MG/1
10 TABLET, FILM COATED, EXTENDED RELEASE ORAL ONCE
Status: COMPLETED | OUTPATIENT
Start: 2018-04-07 | End: 2018-04-07

## 2018-04-07 RX ADMIN — ASPIRIN 81 MG: 81 TABLET, COATED ORAL at 08:04

## 2018-04-07 RX ADMIN — OXYCODONE HYDROCHLORIDE 15 MG: 5 TABLET ORAL at 05:04

## 2018-04-07 RX ADMIN — MORPHINE SULFATE 8 MG: 4 INJECTION INTRAVENOUS at 03:04

## 2018-04-07 RX ADMIN — DIAZEPAM 5 MG: 5 TABLET ORAL at 07:04

## 2018-04-07 RX ADMIN — SODIUM CHLORIDE 1000 ML: 0.9 INJECTION, SOLUTION INTRAVENOUS at 03:04

## 2018-04-07 RX ADMIN — DOCUSATE SODIUM 100 MG: 100 CAPSULE, LIQUID FILLED ORAL at 02:04

## 2018-04-07 RX ADMIN — ACETAMINOPHEN 1000 MG: 10 INJECTION, SOLUTION INTRAVENOUS at 09:04

## 2018-04-07 RX ADMIN — MORPHINE SULFATE 4 MG: 4 INJECTION INTRAVENOUS at 08:04

## 2018-04-07 RX ADMIN — OXYCODONE HYDROCHLORIDE 10 MG: 5 TABLET ORAL at 04:04

## 2018-04-07 RX ADMIN — NICOTINE 1 PATCH: 14 PATCH, EXTENDED RELEASE TRANSDERMAL at 09:04

## 2018-04-07 RX ADMIN — MORPHINE SULFATE 4 MG: 4 INJECTION INTRAVENOUS at 03:04

## 2018-04-07 RX ADMIN — OXYCODONE HYDROCHLORIDE 15 MG: 5 TABLET ORAL at 11:04

## 2018-04-07 RX ADMIN — ACETAMINOPHEN 1000 MG: 10 INJECTION, SOLUTION INTRAVENOUS at 10:04

## 2018-04-07 RX ADMIN — MORPHINE SULFATE 4 MG: 4 INJECTION INTRAVENOUS at 05:04

## 2018-04-07 RX ADMIN — MORPHINE SULFATE 4 MG: 4 INJECTION INTRAVENOUS at 06:04

## 2018-04-07 RX ADMIN — OXYCODONE HYDROCHLORIDE 15 MG: 5 TABLET ORAL at 02:04

## 2018-04-07 RX ADMIN — FAMOTIDINE 20 MG: 20 TABLET, FILM COATED ORAL at 08:04

## 2018-04-07 RX ADMIN — DOCUSATE SODIUM 100 MG: 100 CAPSULE, LIQUID FILLED ORAL at 08:04

## 2018-04-07 RX ADMIN — DIAZEPAM 5 MG: 5 TABLET ORAL at 09:04

## 2018-04-07 RX ADMIN — PREGABALIN 75 MG: 75 CAPSULE ORAL at 08:04

## 2018-04-07 RX ADMIN — CYCLOBENZAPRINE HYDROCHLORIDE 5 MG: 5 TABLET, FILM COATED ORAL at 05:04

## 2018-04-07 RX ADMIN — DIAZEPAM 5 MG: 5 TABLET ORAL at 02:04

## 2018-04-07 RX ADMIN — OXYCODONE HYDROCHLORIDE 10 MG: 10 TABLET, FILM COATED, EXTENDED RELEASE ORAL at 08:04

## 2018-04-07 RX ADMIN — MORPHINE SULFATE 4 MG: 4 INJECTION INTRAVENOUS at 09:04

## 2018-04-07 RX ADMIN — PREGABALIN 75 MG: 75 CAPSULE ORAL at 07:04

## 2018-04-07 NOTE — H&P
[facility]  Orthopedics  H&P    Patient Name: Arnie Barrientos III  MRN: [id number]  Admission Date: 4/7/2018  Primary Care Provider: Primary Doctor No    Patient information was obtained from patient, past medical records and ER records.     Subjective:     Principal Problem:Closed right pilon fracture, initial encounter    Chief Complaint:   Chief Complaint   Patient presents with    Leg Pain     R pilon fracture surgery today, c/o severe pain. last percocet 10/325 about 1 h PTA. some tingling to toes and severe pain to right knee        HPI: Arnie Barrientos III is a 48 y.o. male with a R pilon fx POD1 s/p ORIF. He returned to the ED this morning with uncontrolled pain. He reports that he has been unable to control his pain since the nerve block wore off. He has tried doubling his percocet dose, ice, and elevation with minimal relief. He is able to move his toes with minimal discomfort. He had a fever of 100.8 F overnight. He denies trauma to the foot and has been compliant with non-weightbearing precautions.    History reviewed. No pertinent past medical history.    Past Surgical History:   Procedure Laterality Date    ANKLE SURGERY         Review of patient's allergies indicates:   Allergen Reactions    Pcn [penicillins] Nausea And Vomiting and Rash       Current Facility-Administered Medications   Medication    acetaminophen (10 mg/mL) injection 1,000 mg    aspirin EC tablet 81 mg    diazePAM tablet 5 mg    docusate sodium capsule 100 mg    famotidine tablet 20 mg    morphine injection 4 mg    ondansetron disintegrating tablet 8 mg    oxyCODONE immediate release tablet 10 mg    oxyCODONE immediate release tablet 15 mg    pregabalin capsule 75 mg    sodium chloride 0.9% flush 5 mL     Family History     None        Social History Main Topics    Smoking status: Current Every Day Smoker    Smokeless tobacco: Never Used    Alcohol use Yes    Drug use: No    Sexual activity:  "Not on file     ROS   See ROS documented by ED provider     Objective:     Vital Signs (Most Recent):  Temp: 98.5 °F (36.9 °C) (04/07/18 0509)  Pulse: 97 (04/07/18 0509)  Resp: 18 (04/07/18 0509)  BP: 120/67 (04/07/18 0509)  SpO2: 96 % (04/07/18 0509) Vital Signs (24h Range):  Temp:  [97.5 °F (36.4 °C)-98.5 °F (36.9 °C)] 98.5 °F (36.9 °C)  Pulse:  [] 97  Resp:  [10-18] 18  SpO2:  [95 %-100 %] 96 %  BP: ()/(55-81) 120/67     Weight: 79.4 kg (175 lb)  Height: 5' 9" (175.3 cm)  Body mass index is 25.84 kg/m².    No intake or output data in the 24 hours ending 04/07/18 0638    Ortho/SPM Exam   HEENT: normocephalic, atraumatic  Resp: no increased work of breathing  CV: regular rate and rhythm  MSK: moves b/l upper extremities well    right lower extremity:  Splint c/d/i  Compartments soft  Minimal increase in pain with passive DF/PF of toes  Sensation intact distally  Motor intact EHL/FHL  Cap refill <3 sec      Significant Labs: None    Significant Imaging: I have reviewed all pertinent imaging results/findings. Repeat x-ray of right ankle shows hardware in place. No fractures or change since surgery.    Assessment/Plan:     * Closed right pilon fracture, initial encounter    Arnie Barrientos III is a 48 y.o. male with closed right pilon fx POD1 s/p ORIF  - Multimodal pain control  - NWB RLE  - Ice and elevate  - Will consider anesthesia acute pain consult if pain does not improve            Linda Peter MD  Orthopedics  [facility]    "

## 2018-04-07 NOTE — HPI
Arnie Barrientos III is a 48 y.o. male with a R pilon fx POD1 s/p ORIF. He returned to the ED this morning with uncontrolled pain. He reports that he has been unable to control his pain since the nerve block wore off. He has tried doubling his percocet dose, ice, and elevation with minimal relief. He is able to move his toes with minimal discomfort. He had a fever of 100.8 F overnight. He denies trauma to the foot and has been compliant with non-weightbearing precautions.

## 2018-04-07 NOTE — SUBJECTIVE & OBJECTIVE
"History reviewed. No pertinent past medical history.    Past Surgical History:   Procedure Laterality Date    ANKLE SURGERY         Review of patient's allergies indicates:   Allergen Reactions    Pcn [penicillins] Nausea And Vomiting and Rash       Current Facility-Administered Medications   Medication    acetaminophen (10 mg/mL) injection 1,000 mg    aspirin EC tablet 81 mg    diazePAM tablet 5 mg    docusate sodium capsule 100 mg    famotidine tablet 20 mg    morphine injection 4 mg    ondansetron disintegrating tablet 8 mg    oxyCODONE immediate release tablet 10 mg    oxyCODONE immediate release tablet 15 mg    pregabalin capsule 75 mg    sodium chloride 0.9% flush 5 mL     Family History     None        Social History Main Topics    Smoking status: Current Every Day Smoker    Smokeless tobacco: Never Used    Alcohol use Yes    Drug use: No    Sexual activity: Not on file     ROS   See ROS documented by ED provider     Objective:     Vital Signs (Most Recent):  Temp: 98.5 °F (36.9 °C) (04/07/18 0509)  Pulse: 97 (04/07/18 0509)  Resp: 18 (04/07/18 0509)  BP: 120/67 (04/07/18 0509)  SpO2: 96 % (04/07/18 0509) Vital Signs (24h Range):  Temp:  [97.5 °F (36.4 °C)-98.5 °F (36.9 °C)] 98.5 °F (36.9 °C)  Pulse:  [] 97  Resp:  [10-18] 18  SpO2:  [95 %-100 %] 96 %  BP: ()/(55-81) 120/67     Weight: 79.4 kg (175 lb)  Height: 5' 9" (175.3 cm)  Body mass index is 25.84 kg/m².    No intake or output data in the 24 hours ending 04/07/18 0638    Ortho/SPM Exam   HEENT: normocephalic, atraumatic  Resp: no increased work of breathing  CV: regular rate and rhythm  MSK: moves b/l upper extremities well    right lower extremity:  Splint c/d/i  Compartments soft  Minimal increase in pain with passive DF/PF of toes  Sensation intact distally  Motor intact EHL/FHL  Cap refill <3 sec      Significant Labs: None    Significant Imaging: I have reviewed all pertinent imaging results/findings. Repeat x-ray of " right ankle shows hardware in place. No fractures or change since surgery.

## 2018-04-07 NOTE — ED NOTES
Pt requesting to speak to orthopedic on call md - ortho paged - pt provided spectra link  To talk with md on phone

## 2018-04-07 NOTE — ED PROVIDER NOTES
Encounter Date: 4/7/2018    SCRIBE #1 NOTE: I, Saige Pascal, am scribing for, and in the presence of,  Dr. Dickey. I have scribed the following portions of the note - Other sections scribed: HPI,ROS,Physical Exam.     I, Dr. Homero Dickey, personally performed the services described in this documentation. All medical record entries made by the scribe were at my direction and in my presence.  I have reviewed the chart and agree that the record reflects my personal performance and is accurate and complete.  Homero Dickey MD.  3:21 AM 04/07/2018      History     Chief Complaint   Patient presents with    Leg Pain     R pilon fracture surgery today, c/o severe pain. last percocet 10/325 about 1 h PTA. some tingling to toes and severe pain to right knee     Time patient was seen by the provider: 3:04 AM      The patient is a 48 y.o. male with no co-morbidities who presents to the ED with a complaint of stabbing right lower leg and knee pain. Pt is s/p right pilon fracture surgery April 6th. Pt was discharged the same day and at home he was trying to move it and then the pain began. Pt's last percocet was about an hour PTA that is not relieving his pain.      The history is provided by the patient and medical records.     Review of patient's allergies indicates:   Allergen Reactions    Pcn [penicillins] Nausea And Vomiting and Rash     History reviewed. No pertinent past medical history.  Past Surgical History:   Procedure Laterality Date    ANKLE SURGERY       No family history on file.  Social History   Substance Use Topics    Smoking status: Current Every Day Smoker    Smokeless tobacco: Never Used    Alcohol use Yes     Review of Systems   Musculoskeletal:        (+) right leg pain, right knee pain   All other systems reviewed and are negative.      Physical Exam     Initial Vitals [04/07/18 0254]   BP Pulse Resp Temp SpO2   126/81 (!) 115 18 98.3 °F (36.8 °C) 98 %      MAP       96         Physical Exam    Nursing note  and vitals reviewed.  Constitutional: He appears well-developed and well-nourished.   HENT:   Head: Normocephalic and atraumatic.   Eyes: Conjunctivae and EOM are normal. Pupils are equal, round, and reactive to light.   Neck: Normal range of motion. Neck supple. No tracheal deviation present.   Cardiovascular: Normal rate, regular rhythm, normal heart sounds and intact distal pulses.   Pulmonary/Chest: Breath sounds normal. No respiratory distress. He has no wheezes. He has no rhonchi. He has no rales. He exhibits no tenderness.   Abdominal: Soft. Bowel sounds are normal. He exhibits no distension and no mass. There is no tenderness. There is no rebound and no guarding.   Musculoskeletal: Normal range of motion. He exhibits no edema or tenderness.   No obvious injury to knee or hip. Right leg cast is clean, dry and intact.   Neurological: He is alert and oriented to person, place, and time. He has normal strength and normal reflexes. No cranial nerve deficit or sensory deficit.   Pt is neurovascularly intact    Skin: Skin is warm and dry. Capillary refill takes less than 2 seconds.   His toes are pink.    Psychiatric: He has a normal mood and affect. His behavior is normal. Judgment and thought content normal.         ED Course   Procedures  Labs Reviewed - No data to display          Medical Decision Making:   History:   Old Medical Records: I decided to obtain old medical records.  ED Management:  Pt will be admitted for observation for pain control            Scribe Attestation:   Scribe #1: I performed the above scribed service and the documentation accurately describes the services I performed. I attest to the accuracy of the note.               Clinical Impression:   The encounter diagnosis was Post-operative pain.                           Igor Dickey MD  04/07/18 0321       Igor Dickey MD  04/07/18 040

## 2018-04-07 NOTE — ED NOTES
Arnie Barrientos III, an 48 y.o. male presents to the ED  C/o ankle pain after fx sx at [facility] today- he reports being at  Home and trying to reposition and then feeling severe pain - he reports taking rx meds with no relief.       Chief Complaint   Patient presents with    Leg Pain     R pilon fracture surgery today, c/o severe pain. last percocet 10/325 about 1 h PTA. some tingling to toes and severe pain to right knee     Review of patient's allergies indicates:   Allergen Reactions    Pcn [penicillins] Nausea And Vomiting and Rash     History reviewed. No pertinent past medical history.

## 2018-04-07 NOTE — ASSESSMENT & PLAN NOTE
Arnie MACARIO Floyd III is a 48 y.o. male with closed right pilon fx POD1 s/p ORIF  - Multimodal pain control  - NWB RLE  - Ice and elevate  - Will consider anesthesia acute pain consult if pain does not improve

## 2018-04-08 VITALS
DIASTOLIC BLOOD PRESSURE: 74 MMHG | BODY MASS INDEX: 25.92 KG/M2 | SYSTOLIC BLOOD PRESSURE: 122 MMHG | HEIGHT: 69 IN | RESPIRATION RATE: 16 BRPM | TEMPERATURE: 98 F | OXYGEN SATURATION: 98 % | WEIGHT: 175 LBS | HEART RATE: 92 BPM

## 2018-04-08 PROCEDURE — S4991 NICOTINE PATCH NONLEGEND: HCPCS | Performed by: STUDENT IN AN ORGANIZED HEALTH CARE EDUCATION/TRAINING PROGRAM

## 2018-04-08 PROCEDURE — 25000003 PHARM REV CODE 250: Performed by: STUDENT IN AN ORGANIZED HEALTH CARE EDUCATION/TRAINING PROGRAM

## 2018-04-08 PROCEDURE — G0378 HOSPITAL OBSERVATION PER HR: HCPCS

## 2018-04-08 PROCEDURE — 63600175 PHARM REV CODE 636 W HCPCS: Performed by: ORTHOPAEDIC SURGERY

## 2018-04-08 PROCEDURE — 25000003 PHARM REV CODE 250: Performed by: ORTHOPAEDIC SURGERY

## 2018-04-08 RX ORDER — PREGABALIN 75 MG/1
75 CAPSULE ORAL 2 TIMES DAILY
Qty: 28 CAPSULE | Refills: 0 | Status: SHIPPED | OUTPATIENT
Start: 2018-04-08 | End: 2018-08-14

## 2018-04-08 RX ORDER — DIAZEPAM 5 MG/1
5 TABLET ORAL EVERY 6 HOURS PRN
Qty: 45 TABLET | Refills: 0 | Status: SHIPPED | OUTPATIENT
Start: 2018-04-08 | End: 2018-08-14

## 2018-04-08 RX ADMIN — DIAZEPAM 5 MG: 5 TABLET ORAL at 11:04

## 2018-04-08 RX ADMIN — DIAZEPAM 5 MG: 5 TABLET ORAL at 06:04

## 2018-04-08 RX ADMIN — DOCUSATE SODIUM 100 MG: 100 CAPSULE, LIQUID FILLED ORAL at 02:04

## 2018-04-08 RX ADMIN — FAMOTIDINE 20 MG: 20 TABLET, FILM COATED ORAL at 08:04

## 2018-04-08 RX ADMIN — NICOTINE 1 PATCH: 14 PATCH, EXTENDED RELEASE TRANSDERMAL at 08:04

## 2018-04-08 RX ADMIN — DIAZEPAM 5 MG: 5 TABLET ORAL at 05:04

## 2018-04-08 RX ADMIN — OXYCODONE HYDROCHLORIDE 10 MG: 5 TABLET ORAL at 02:04

## 2018-04-08 RX ADMIN — OXYCODONE HYDROCHLORIDE 15 MG: 5 TABLET ORAL at 05:04

## 2018-04-08 RX ADMIN — DOCUSATE SODIUM 100 MG: 100 CAPSULE, LIQUID FILLED ORAL at 08:04

## 2018-04-08 RX ADMIN — ASPIRIN 81 MG: 81 TABLET, COATED ORAL at 08:04

## 2018-04-08 RX ADMIN — OXYCODONE HYDROCHLORIDE 10 MG: 5 TABLET ORAL at 08:04

## 2018-04-08 RX ADMIN — MORPHINE SULFATE 4 MG: 4 INJECTION INTRAVENOUS at 01:04

## 2018-04-08 RX ADMIN — MORPHINE SULFATE 4 MG: 4 INJECTION INTRAVENOUS at 05:04

## 2018-04-08 RX ADMIN — PREGABALIN 75 MG: 75 CAPSULE ORAL at 08:04

## 2018-04-08 RX ADMIN — OXYCODONE HYDROCHLORIDE 15 MG: 5 TABLET ORAL at 02:04

## 2018-04-08 RX ADMIN — ACETAMINOPHEN 1000 MG: 10 INJECTION, SOLUTION INTRAVENOUS at 05:04

## 2018-04-08 RX ADMIN — OXYCODONE HYDROCHLORIDE 10 MG: 5 TABLET ORAL at 11:04

## 2018-04-08 NOTE — SUBJECTIVE & OBJECTIVE
"Principal Problem:Closed right pilon fracture, initial encounter    Principal Orthopedic Problem: same    Interval History: AFVSS. Pain controlled on current regimen.    Review of patient's allergies indicates:   Allergen Reactions    Pcn [penicillins] Nausea And Vomiting and Rash       Current Facility-Administered Medications   Medication    aspirin EC tablet 81 mg    diazePAM tablet 5 mg    docusate sodium capsule 100 mg    famotidine tablet 20 mg    morphine injection 4 mg    nicotine 14 mg/24 hr 1 patch    ondansetron disintegrating tablet 8 mg    oxyCODONE immediate release tablet 10 mg    oxyCODONE immediate release tablet 15 mg    pregabalin capsule 75 mg    sodium chloride 0.9% flush 5 mL     Objective:     Vital Signs (Most Recent):  Temp: 99.4 °F (37.4 °C) (04/08/18 0255)  Pulse: 102 (04/08/18 0255)  Resp: 17 (04/08/18 0255)  BP: (!) 143/79 (04/08/18 0255)  SpO2: 97 % (04/08/18 0255) Vital Signs (24h Range):  Temp:  [98.2 °F (36.8 °C)-99.7 °F (37.6 °C)] 99.4 °F (37.4 °C)  Pulse:  [] 102  Resp:  [14-18] 17  SpO2:  [96 %-98 %] 97 %  BP: (108-143)/(65-79) 143/79     Weight: 79.4 kg (175 lb)  Height: 5' 9" (175.3 cm)  Body mass index is 25.84 kg/m².      Intake/Output Summary (Last 24 hours) at 04/08/18 0716  Last data filed at 04/08/18 0600   Gross per 24 hour   Intake              400 ml   Output             2700 ml   Net            -2300 ml       Ortho/SPM Exam   HEENT: normocephalic, atraumatic  Resp: no increased work of breathing  CV: regular rate and rhythm  MSK: moves b/l upper extremities well    right lower extremity:  Splint c/d/i  Compartments soft  Moving toes without discomfort  NVI distally      Significant Labs: None    Significant Imaging: None  "

## 2018-04-08 NOTE — DISCHARGE SUMMARY
[facility]  Orthopedics  Discharge Summary      Patient Name: Arnie Tan III  MRN: [id number]  Admission Date: 4/7/2018  Hospital Length of Stay: 0 days  Discharge Date and Time:  04/08/2018 8:10 AM  Attending Physician: Matt Masterson MD   Discharging Provider: Linda Peter MD  Primary Care Provider: Primary Doctor No    HPI:   Arnie Tan III is a 48 y.o. male with a R pilon fx POD1 s/p ORIF. He returned to the ED this morning with uncontrolled pain. He reports that he has been unable to control his pain since the nerve block wore off. He has tried doubling his percocet dose, ice, and elevation with minimal relief. He is able to move his toes with minimal discomfort. He had a fever of 100.8 F overnight. He denies trauma to the foot and has been compliant with non-weightbearing precautions.     Hospital Course:  Pain controlled on multimodal pain regimen. Weaned off IV pain meds. Agreeable to discharge this evening. Voiding. Tolerating diet. Discharge instructions, follow-up appointment, and med rec are below.        Significant Diagnostic Studies: No pertinent studies.    Pending Diagnostic Studies:     None        Final Active Diagnoses:    Diagnosis Date Noted POA    PRINCIPAL PROBLEM:  Closed right pilon fracture, initial encounter [S82.871A] 04/06/2018 Yes    Post-operative pain [G89.18] 04/07/2018 Yes      Problems Resolved During this Admission:    Diagnosis Date Noted Date Resolved POA      Discharged Condition: good    Disposition: Home or Self Care    Follow Up:  Follow-up Information     Dolores Storey PA-C On 4/20/2018.    Specialty:  Orthopedic Surgery  Contact information:  [address]  [facility] [address]  [phone]                 Patient Instructions:     Diet Adult Regular     Call MD for:  temperature >100.4     Call MD for:  persistent nausea and vomiting or diarrhea     Call MD for:  severe uncontrolled pain     Call MD for:  redness,  tenderness, or signs of infection (pain, swelling, redness, odor or green/yellow discharge around incision site)     Call MD for:  difficulty breathing or increased cough     Call MD for:  severe persistent headache     Call MD for:  worsening rash     Call MD for:  persistent dizziness, light-headedness, or visual disturbances     Call MD for:  increased confusion or weakness     Activity as tolerated       Medications:  Reconciled Home Medications:      Medication List      START taking these medications    diazePAM 5 MG tablet  Commonly known as:  VALIUM  Take 1 tablet (5 mg total) by mouth every 6 (six) hours as needed (muscle spasms).     pregabalin 75 MG capsule  Commonly known as:  LYRICA  Take 1 capsule (75 mg total) by mouth 2 (two) times daily.        CONTINUE taking these medications    aspirin 81 MG EC tablet  Commonly known as:  ECOTRIN  Take 1 tablet (81 mg total) by mouth 2 (two) times daily.     docusate sodium 100 MG capsule  Commonly known as:  COLACE  Take 1 capsule (100 mg total) by mouth 3 (three) times daily.     famotidine 20 MG tablet  Commonly known as:  PEPCID  Take 1 tablet (20 mg total) by mouth 2 (two) times daily.     ondansetron 8 MG Tbdl  Commonly known as:  ZOFRAN-ODT  Take 1 tablet (8 mg total) by mouth every 8 (eight) hours as needed.     oxyCODONE-acetaminophen  mg per tablet  Commonly known as:  PERCOCET  Take 1 tablet by mouth every 4 to 6 hours as needed for Pain.     polyethylene glycol 17 gram/dose powder  Commonly known as:  GLYCOLAX  Take 17 g by mouth once daily.           Where to Get Your Medications      You can get these medications from any pharmacy    Bring a paper prescription for each of these medications  · diazePAM 5 MG tablet  · pregabalin 75 MG capsule         Linda Peter MD  Orthopedics  [facility]

## 2018-04-08 NOTE — PROGRESS NOTES
IV removed.  Bleeding controlled.  All belongings gathered by pt's girlfriend.  Discharge papers reviewed and given to girlfriend.  Prescriptions given to girlfriend.

## 2018-04-08 NOTE — PLAN OF CARE
Problem: Pain, Acute (Adult)  Intervention: Monitor/Manage Analgesia  Pt c/o of pain to right ankle prn pain meds given as directed

## 2018-04-08 NOTE — PROGRESS NOTES
"[facility]  Orthopedics  Progress Note    Patient Name: Arnie Barrientos III  MRN: [id number]  Admission Date: 4/7/2018  Hospital Length of Stay: 0 days  Attending Provider: Matt Masterson MD  Primary Care Provider: Primary Doctor No    Subjective:     Principal Problem:Closed right pilon fracture, initial encounter    Principal Orthopedic Problem: same    Interval History: AFVSS. Pain controlled on current regimen.    Review of patient's allergies indicates:   Allergen Reactions    Pcn [penicillins] Nausea And Vomiting and Rash       Current Facility-Administered Medications   Medication    aspirin EC tablet 81 mg    diazePAM tablet 5 mg    docusate sodium capsule 100 mg    famotidine tablet 20 mg    morphine injection 4 mg    nicotine 14 mg/24 hr 1 patch    ondansetron disintegrating tablet 8 mg    oxyCODONE immediate release tablet 10 mg    oxyCODONE immediate release tablet 15 mg    pregabalin capsule 75 mg    sodium chloride 0.9% flush 5 mL     Objective:     Vital Signs (Most Recent):  Temp: 99.4 °F (37.4 °C) (04/08/18 0255)  Pulse: 102 (04/08/18 0255)  Resp: 17 (04/08/18 0255)  BP: (!) 143/79 (04/08/18 0255)  SpO2: 97 % (04/08/18 0255) Vital Signs (24h Range):  Temp:  [98.2 °F (36.8 °C)-99.7 °F (37.6 °C)] 99.4 °F (37.4 °C)  Pulse:  [] 102  Resp:  [14-18] 17  SpO2:  [96 %-98 %] 97 %  BP: (108-143)/(65-79) 143/79     Weight: 79.4 kg (175 lb)  Height: 5' 9" (175.3 cm)  Body mass index is 25.84 kg/m².      Intake/Output Summary (Last 24 hours) at 04/08/18 0716  Last data filed at 04/08/18 0600   Gross per 24 hour   Intake              400 ml   Output             2700 ml   Net            -2300 ml       Ortho/SPM Exam   HEENT: normocephalic, atraumatic  Resp: no increased work of breathing  CV: regular rate and rhythm  MSK: moves b/l upper extremities well    right lower extremity:  Splint c/d/i  Compartments soft  Moving toes without discomfort  NVI distally      Significant " Labs: None    Significant Imaging: None    Assessment/Plan:     * Closed right pilon fracture, initial encounter    Arnie Barrientos III is a 48 y.o. male with closed right pilon fx POD2 s/p ORIF  - Multimodal pain control, wean from IV meds today  - NWB RLE  - Ice and elevate  - Discharge home today              Linda Peter MD  Orthopedics  [facility]

## 2018-04-08 NOTE — ASSESSMENT & PLAN NOTE
Arnie MACARIO Floyd III is a 48 y.o. male with closed right pilon fx POD2 s/p ORIF  - Multimodal pain control, wean from IV meds today  - NWB RLE  - Ice and elevate  - Discharge home today

## 2018-04-09 PROCEDURE — 27818 TREATMENT OF ANKLE FRACTURE: CPT

## 2018-04-10 DIAGNOSIS — G89.18 POST-OP PAIN: Primary | ICD-10-CM

## 2018-04-10 DIAGNOSIS — R11.0 NAUSEA: ICD-10-CM

## 2018-04-10 RX ORDER — ONDANSETRON 4 MG/1
8 TABLET, ORALLY DISINTEGRATING ORAL ONCE
Qty: 15 TABLET | Refills: 0 | Status: SHIPPED | OUTPATIENT
Start: 2018-04-10 | End: 2018-04-10

## 2018-04-10 NOTE — PROCEDURES
Closed reduction R ankle fracture  Splinted in well molded splint  Completed under hematoma block  Patient tolerated procedure well  Post procedure radiographs with adequate reduction

## 2018-04-12 ENCOUNTER — PATIENT MESSAGE (OUTPATIENT)
Dept: ORTHOPEDICS | Facility: CLINIC | Age: 48
End: 2018-04-12

## 2018-04-13 DIAGNOSIS — S82.871A CLOSED RIGHT PILON FRACTURE, INITIAL ENCOUNTER: Primary | ICD-10-CM

## 2018-04-13 DIAGNOSIS — G89.18 POST-OP PAIN: ICD-10-CM

## 2018-04-13 RX ORDER — OXYCODONE AND ACETAMINOPHEN 10; 325 MG/1; MG/1
1 TABLET ORAL
Qty: 42 TABLET | Refills: 0 | Status: SHIPPED | OUTPATIENT
Start: 2018-04-13 | End: 2018-04-20 | Stop reason: SDUPTHER

## 2018-04-13 RX ORDER — OXYCODONE AND ACETAMINOPHEN 10; 325 MG/1; MG/1
1 TABLET ORAL
Qty: 42 TABLET | Refills: 0 | Status: SHIPPED | OUTPATIENT
Start: 2018-04-13 | End: 2018-04-13 | Stop reason: SDUPTHER

## 2018-04-20 ENCOUNTER — TELEPHONE (OUTPATIENT)
Dept: ORTHOPEDICS | Facility: CLINIC | Age: 48
End: 2018-04-20

## 2018-04-20 ENCOUNTER — OFFICE VISIT (OUTPATIENT)
Dept: ORTHOPEDICS | Facility: CLINIC | Age: 48
End: 2018-04-20
Payer: COMMERCIAL

## 2018-04-20 DIAGNOSIS — G89.18 POST-OP PAIN: ICD-10-CM

## 2018-04-20 DIAGNOSIS — Z98.890 S/P ORIF (OPEN REDUCTION INTERNAL FIXATION) FRACTURE: ICD-10-CM

## 2018-04-20 DIAGNOSIS — Z87.81 S/P ORIF (OPEN REDUCTION INTERNAL FIXATION) FRACTURE: ICD-10-CM

## 2018-04-20 DIAGNOSIS — Z09 S/P ORTHOPEDIC SURGERY, FOLLOW-UP EXAM: ICD-10-CM

## 2018-04-20 DIAGNOSIS — S82.871A CLOSED RIGHT PILON FRACTURE, INITIAL ENCOUNTER: Primary | ICD-10-CM

## 2018-04-20 PROCEDURE — 29405 APPL SHORT LEG CAST: CPT | Mod: 58,RT,S$GLB, | Performed by: PHYSICIAN ASSISTANT

## 2018-04-20 PROCEDURE — 99024 POSTOP FOLLOW-UP VISIT: CPT | Mod: S$GLB,,, | Performed by: PHYSICIAN ASSISTANT

## 2018-04-20 PROCEDURE — 99999 PR PBB SHADOW E&M-EST. PATIENT-LVL II: CPT | Mod: PBBFAC,,, | Performed by: PHYSICIAN ASSISTANT

## 2018-04-20 RX ORDER — OXYCODONE AND ACETAMINOPHEN 10; 325 MG/1; MG/1
1 TABLET ORAL
Qty: 42 TABLET | Refills: 0 | Status: SHIPPED | OUTPATIENT
Start: 2018-04-20 | End: 2018-05-01 | Stop reason: SDUPTHER

## 2018-04-20 NOTE — PROGRESS NOTES
Removed plaster postop posterior splint from the right lower extremity per written order by Dolores Storey PA-C. Applied a fiberglass SLC to the right lower extremity per written order by Dolores Storey PA-C. Proper cast care given to the patient and wife at the time of application pt and wife verbalized understanding.

## 2018-04-20 NOTE — TELEPHONE ENCOUNTER
Spoke to Shanta and she stated the patient wanted to know can he ice his cast. Informed Shanta that the patient can ice the cast but he will still need to follow the cast care protocols by protecting the cast from getting wet and if it does get wet he will have to get the cast changed the same day as instructed. Shanta verbalized understanding.          ----- Message from Madhu Moss sent at 4/20/2018  3:11 PM CDT -----  Contact: Self  Patient would like a call back to see what he can do for pain. And also can he but ice it.

## 2018-04-21 ENCOUNTER — PATIENT MESSAGE (OUTPATIENT)
Dept: ORTHOPEDICS | Facility: CLINIC | Age: 48
End: 2018-04-21

## 2018-04-23 ENCOUNTER — TELEPHONE (OUTPATIENT)
Dept: ORTHOPEDICS | Facility: CLINIC | Age: 48
End: 2018-04-23

## 2018-04-23 NOTE — TELEPHONE ENCOUNTER
----- Message from Madhu Moss sent at 4/23/2018 10:28 AM CDT -----  Contact: Shanta/ [phone]  Shanta was calling to see if the office received a authorization fax for the patient.
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
## 2018-04-25 ENCOUNTER — TELEPHONE (OUTPATIENT)
Dept: ORTHOPEDICS | Facility: CLINIC | Age: 48
End: 2018-04-25

## 2018-05-01 ENCOUNTER — TELEPHONE (OUTPATIENT)
Dept: ORTHOPEDICS | Facility: CLINIC | Age: 48
End: 2018-05-01

## 2018-05-01 DIAGNOSIS — Z98.890 S/P ORIF (OPEN REDUCTION INTERNAL FIXATION) FRACTURE: ICD-10-CM

## 2018-05-01 DIAGNOSIS — Z09 S/P ORTHOPEDIC SURGERY, FOLLOW-UP EXAM: Primary | ICD-10-CM

## 2018-05-01 DIAGNOSIS — S82.871A CLOSED RIGHT PILON FRACTURE, INITIAL ENCOUNTER: ICD-10-CM

## 2018-05-01 DIAGNOSIS — G89.18 POST-OP PAIN: ICD-10-CM

## 2018-05-01 DIAGNOSIS — Z87.81 S/P ORIF (OPEN REDUCTION INTERNAL FIXATION) FRACTURE: ICD-10-CM

## 2018-05-01 RX ORDER — HYDROCODONE BITARTRATE AND ACETAMINOPHEN 10; 325 MG/1; MG/1
1 TABLET ORAL
Qty: 60 TABLET | Refills: 0 | Status: SHIPPED | OUTPATIENT
Start: 2018-05-01 | End: 2018-05-15

## 2018-05-01 RX ORDER — OXYCODONE AND ACETAMINOPHEN 10; 325 MG/1; MG/1
1 TABLET ORAL EVERY 6 HOURS PRN
Qty: 42 TABLET | Refills: 0 | Status: SHIPPED | OUTPATIENT
Start: 2018-05-01 | End: 2018-05-01

## 2018-05-01 NOTE — TELEPHONE ENCOUNTER
Spoke to pt and informed him that the rx is ready for pickup at the orthopedic . Pt verbalized understanding.          ----- Message from Dolores Storey PA-C sent at 5/1/2018  7:14 AM CDT -----  Contact: self   Paper prescription ready for   ----- Message -----  From: Juve Dowling  Sent: 4/30/2018   3:59 PM  To: Dolores Storey PA-C        ----- Message -----  From: Cj Carpio  Sent: 4/30/2018   3:53 PM  To: Raleigh Bernal Staff    Pt is requesting a refill on oxyCODONE-acetaminophen (PERCOCET)  mg per tablet sent to [facility] - [place], [place] - [address] [phone] (Phone) [phone] (Fax)

## 2018-05-01 NOTE — TELEPHONE ENCOUNTER
2ND NOTE.CHANDNI Storey PA-C  Notified pt  Caregiver that his RX NORCO  mg is ready for pick. Pt cargiver must have valid ID to  RX. [address] [facility] Office hours Monday-Friday 8AM - 5PM. Close on weekends and holidays. Please call for updates. Patient caregiver states verbal understanding and has no further questions.

## 2018-05-09 ENCOUNTER — PATIENT MESSAGE (OUTPATIENT)
Dept: ORTHOPEDICS | Facility: CLINIC | Age: 48
End: 2018-05-09

## 2018-05-12 ENCOUNTER — NURSE TRIAGE (OUTPATIENT)
Dept: ADMINISTRATIVE | Facility: CLINIC | Age: 48
End: 2018-05-12

## 2018-05-12 NOTE — TELEPHONE ENCOUNTER
Reason for Disposition   [1] SEVERE pain of fingers or toes AND [2] not improved after pain medications and elevation    Protocols used: ST CAST SYMPTOMS AND [id number]

## 2018-05-12 NOTE — TELEPHONE ENCOUNTER
"  Answer Assessment - Initial Assessment Questions  1. CAST LOCATION: "Where is the cast?"      Right foot   2. CAST SYMPTOM: "What's the main symptom you're concerned about?" (e.g., numbness, pain, color change)      Tightness rubbing   3. ONSET: "When did ________  start?", "Is it getting worse?"      Two days   4. WHEN CAST: When was the cast put on?"       Three weeks   5. INJURY: "What is the underlying injury?"  (fracture, torn ligament, surgery, etc)       Shattered ankle/broken foot   6. PAIN: "Is there any pain?" If so, ask: "How bad is it?"  (Scale 1-10; or mild, moderate, severe)    - MILD - doesn't interfere with normal activities    - MODERATE - interferes with normal activities (e.g., work or school) or awakens from sleep    - SEVERE - excruciating pain, unable to do any normal activities      6-7/10   7. OTHER SYMPTOMS: "Do you have any other symptoms?" (e.g., fever, difficulty breathing)      No    Protocols used: ST CAST SYMPTOMS AND [id number]    "

## 2018-05-14 ENCOUNTER — TELEPHONE (OUTPATIENT)
Dept: ORTHOPEDICS | Facility: CLINIC | Age: 48
End: 2018-05-14

## 2018-05-14 ENCOUNTER — PATIENT MESSAGE (OUTPATIENT)
Dept: ORTHOPEDICS | Facility: CLINIC | Age: 48
End: 2018-05-14

## 2018-05-14 NOTE — TELEPHONE ENCOUNTER
----- Message from Hazel Jessica sent at 5/14/2018 10:12 AM CDT -----  Contact: Anju, [employer]  Anju is calling to get some additional information needed to approve pts appeal for oxycodone.  She can be reached at [phone]      Reference number [id number]

## 2018-05-14 NOTE — TELEPHONE ENCOUNTER
Called and Informed patient of appointment on05/15/18 at 7:45 am. Cast rubbing.  Patient states verbal understanding and has no further questions.

## 2018-05-14 NOTE — TELEPHONE ENCOUNTER
----- Message from Cj Carpio sent at 5/14/2018  2:46 PM CDT -----  Contact: self   Pt is requesting a call back to reschedule post-op appt. Pt can be reached at [phone].

## 2018-05-15 ENCOUNTER — PATIENT MESSAGE (OUTPATIENT)
Dept: ORTHOPEDICS | Facility: CLINIC | Age: 48
End: 2018-05-15

## 2018-05-15 ENCOUNTER — HOSPITAL ENCOUNTER (OUTPATIENT)
Dept: RADIOLOGY | Facility: HOSPITAL | Age: 48
Discharge: HOME | End: 2018-05-15
Attending: PHYSICIAN ASSISTANT
Payer: COMMERCIAL

## 2018-05-15 ENCOUNTER — OFFICE VISIT (OUTPATIENT)
Dept: ORTHOPEDICS | Facility: CLINIC | Age: 48
End: 2018-05-15
Payer: COMMERCIAL

## 2018-05-15 DIAGNOSIS — Z09 S/P ORTHOPEDIC SURGERY, FOLLOW-UP EXAM: Primary | ICD-10-CM

## 2018-05-15 DIAGNOSIS — S82.871A CLOSED RIGHT PILON FRACTURE, INITIAL ENCOUNTER: ICD-10-CM

## 2018-05-15 DIAGNOSIS — Z09 S/P ORTHOPEDIC SURGERY, FOLLOW-UP EXAM: ICD-10-CM

## 2018-05-15 DIAGNOSIS — Z98.890 S/P ORIF (OPEN REDUCTION INTERNAL FIXATION) FRACTURE: ICD-10-CM

## 2018-05-15 DIAGNOSIS — Z87.81 S/P ORIF (OPEN REDUCTION INTERNAL FIXATION) FRACTURE: ICD-10-CM

## 2018-05-15 DIAGNOSIS — G89.18 POST-OP PAIN: ICD-10-CM

## 2018-05-15 PROCEDURE — 73610 X-RAY EXAM OF ANKLE: CPT | Mod: TC,RT

## 2018-05-15 PROCEDURE — 99024 POSTOP FOLLOW-UP VISIT: CPT | Mod: S$GLB,,, | Performed by: PHYSICIAN ASSISTANT

## 2018-05-15 PROCEDURE — 99999 PR PBB SHADOW E&M-EST. PATIENT-LVL II: CPT | Mod: PBBFAC,,, | Performed by: PHYSICIAN ASSISTANT

## 2018-05-15 PROCEDURE — 73610 X-RAY EXAM OF ANKLE: CPT | Mod: 26,RT,, | Performed by: RADIOLOGY

## 2018-05-15 RX ORDER — TRAMADOL HYDROCHLORIDE 50 MG/1
50 TABLET ORAL
Qty: 60 TABLET | Refills: 0 | Status: SHIPPED | OUTPATIENT
Start: 2018-05-15 | End: 2018-05-25

## 2018-05-17 ENCOUNTER — TELEPHONE (OUTPATIENT)
Dept: ORTHOPEDICS | Facility: CLINIC | Age: 48
End: 2018-05-17

## 2018-05-17 ENCOUNTER — PATIENT MESSAGE (OUTPATIENT)
Dept: ORTHOPEDICS | Facility: CLINIC | Age: 48
End: 2018-05-17
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
## 2018-05-21 ENCOUNTER — PATIENT MESSAGE (OUTPATIENT)
Dept: ORTHOPEDICS | Facility: CLINIC | Age: 48
End: 2018-05-21

## 2018-05-22 ENCOUNTER — TELEPHONE (OUTPATIENT)
Dept: ORTHOPEDICS | Facility: CLINIC | Age: 48
End: 2018-05-22

## 2018-06-01 ENCOUNTER — PATIENT MESSAGE (OUTPATIENT)
Dept: ORTHOPEDICS | Facility: CLINIC | Age: 48
End: 2018-06-01

## 2018-06-19 ENCOUNTER — HOSPITAL ENCOUNTER (OUTPATIENT)
Dept: RADIOLOGY | Facility: HOSPITAL | Age: 48
Discharge: HOME | End: 2018-06-19
Attending: PHYSICIAN ASSISTANT
Payer: COMMERCIAL

## 2018-06-19 ENCOUNTER — OFFICE VISIT (OUTPATIENT)
Dept: ORTHOPEDICS | Facility: CLINIC | Age: 48
End: 2018-06-19
Payer: COMMERCIAL

## 2018-06-19 DIAGNOSIS — Z87.81 S/P ORIF (OPEN REDUCTION INTERNAL FIXATION) FRACTURE: ICD-10-CM

## 2018-06-19 DIAGNOSIS — Z09 S/P ORTHOPEDIC SURGERY, FOLLOW-UP EXAM: ICD-10-CM

## 2018-06-19 DIAGNOSIS — Z98.890 S/P ORIF (OPEN REDUCTION INTERNAL FIXATION) FRACTURE: ICD-10-CM

## 2018-06-19 DIAGNOSIS — S82.871A CLOSED RIGHT PILON FRACTURE, INITIAL ENCOUNTER: ICD-10-CM

## 2018-06-19 DIAGNOSIS — M25.571 ACUTE RIGHT ANKLE PAIN: Primary | ICD-10-CM

## 2018-06-19 DIAGNOSIS — M25.571 ACUTE RIGHT ANKLE PAIN: ICD-10-CM

## 2018-06-19 PROCEDURE — 99024 POSTOP FOLLOW-UP VISIT: CPT | Mod: S$GLB,,, | Performed by: PHYSICIAN ASSISTANT

## 2018-06-19 PROCEDURE — 73610 X-RAY EXAM OF ANKLE: CPT | Mod: 26,RT,, | Performed by: RADIOLOGY

## 2018-06-19 PROCEDURE — 99999 PR PBB SHADOW E&M-EST. PATIENT-LVL III: CPT | Mod: PBBFAC,,, | Performed by: PHYSICIAN ASSISTANT

## 2018-06-19 PROCEDURE — 73610 X-RAY EXAM OF ANKLE: CPT | Mod: TC,RT
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
## 2018-06-22 ENCOUNTER — PATIENT MESSAGE (OUTPATIENT)
Dept: ORTHOPEDICS | Facility: CLINIC | Age: 48
End: 2018-06-22

## 2018-06-22 ENCOUNTER — TELEPHONE (OUTPATIENT)
Dept: ORTHOPEDICS | Facility: CLINIC | Age: 48
End: 2018-06-22

## 2018-06-25 ENCOUNTER — PATIENT MESSAGE (OUTPATIENT)
Dept: ORTHOPEDICS | Facility: CLINIC | Age: 48
End: 2018-06-25

## 2018-06-25 DIAGNOSIS — Z09 S/P ORTHOPEDIC SURGERY, FOLLOW-UP EXAM: Primary | ICD-10-CM

## 2018-06-25 DIAGNOSIS — Z87.81 S/P ORIF (OPEN REDUCTION INTERNAL FIXATION) FRACTURE: ICD-10-CM

## 2018-06-25 DIAGNOSIS — Z98.890 S/P ORIF (OPEN REDUCTION INTERNAL FIXATION) FRACTURE: ICD-10-CM

## 2018-06-25 DIAGNOSIS — G89.18 POST-OPERATIVE PAIN: ICD-10-CM

## 2018-06-25 RX ORDER — TRAMADOL HYDROCHLORIDE 50 MG/1
50 TABLET ORAL
Qty: 60 TABLET | Refills: 0 | Status: SHIPPED | OUTPATIENT
Start: 2018-06-25 | End: 2018-07-05

## 2018-06-27 ENCOUNTER — CLINICAL SUPPORT (OUTPATIENT)
Dept: REHABILITATION | Facility: HOSPITAL | Age: 48
End: 2018-06-27
Attending: PHYSICIAN ASSISTANT
Payer: COMMERCIAL

## 2018-06-27 DIAGNOSIS — M25.673 DECREASED RANGE OF MOTION OF ANKLE: ICD-10-CM

## 2018-06-27 DIAGNOSIS — R29.898 ANKLE WEAKNESS: ICD-10-CM

## 2018-06-27 DIAGNOSIS — R26.89 IMPAIRED GAIT AND MOBILITY: ICD-10-CM

## 2018-06-27 PROBLEM — M25.571 ANKLE PAIN, RIGHT: Chronic | Status: ACTIVE | Noted: 2018-03-25

## 2018-06-27 PROCEDURE — 97161 PT EVAL LOW COMPLEX 20 MIN: CPT

## 2018-06-27 PROCEDURE — 97110 THERAPEUTIC EXERCISES: CPT

## 2018-06-27 NOTE — PATIENT INSTRUCTIONS
ANKLE: Pump - Sitting        With involved leg straight, bend foot toward floor, then toward ceiling.  Repeat ___ times. Do ___ times per day.    Copyright © I. All rights reserved.   Ankle [place] (Ankle Mobility / Flexibility)        Sit facing forward. Extend one leg forward, foot a few inches above floor, with toes pointed (or resting on the floor). Rotate ankle, making [place] with foot, while breathing in and out slowly.  Repeat _30_ times in each direction. Do _3-5_ sessions per day.    Copyright © I. All rights reserved.      Gastroc / Heel Cord Stretch - Seated With Towel        Sit on floor, towel around ball of foot. Gently pull foot in toward body, stretching heel cord and calf. Hold _30__ seconds. Repeat _3_ times. Do _3-5__ sessions per day.    Copyright © [employer]. All rights reserved.   Long Arc Quad        Tighten muscle on front of thigh and extend leg. Hold straight for _3__ seconds. Curl leg back as far as possible and hold for _3_ seconds.  Repeat _20__ times. Do _3-5__ sessions per day.    Copyright © [employer]. All rights reserved.   PRE GAIT: Standing Weight Shift        Stand with upright posture, shift weight from side to side.  _20__ reps per set, _2_ sets per day. Hold onto a support.    Copyright © [employer]. All rights reserved.
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
## 2018-07-05 ENCOUNTER — CLINICAL SUPPORT (OUTPATIENT)
Dept: REHABILITATION | Facility: HOSPITAL | Age: 48
End: 2018-07-05
Payer: COMMERCIAL

## 2018-07-05 DIAGNOSIS — R29.898 ANKLE WEAKNESS: ICD-10-CM

## 2018-07-05 DIAGNOSIS — R26.89 IMPAIRED GAIT AND MOBILITY: ICD-10-CM

## 2018-07-05 DIAGNOSIS — M25.571 ACUTE RIGHT ANKLE PAIN: ICD-10-CM

## 2018-07-05 DIAGNOSIS — M25.673 DECREASED RANGE OF MOTION OF ANKLE: Primary | ICD-10-CM

## 2018-07-05 PROCEDURE — 97140 MANUAL THERAPY 1/> REGIONS: CPT | Mod: PN

## 2018-07-05 PROCEDURE — 97110 THERAPEUTIC EXERCISES: CPT | Mod: PN

## 2018-07-05 NOTE — PROGRESS NOTES
"  Physical Therapy Daily Treatment Note     Name: Arnie MACARIO [facility]  Clinic Number: [id number]    Therapy Diagnosis:   Encounter Diagnoses   Name Primary?    Impaired gait and mobility     Decreased range of motion of ankle Yes    Ankle weakness     Acute right ankle pain      Physician: Dolores Storey PA-C    Visit Date: 7/5/2018  Physician Orders: PT Eval and Treat Evaluate and treat 2-3 x per week x 6 weeks. Open treatment of right tibial pilon fracture with reduction and internal fixation without fibula by Dr. Masterson  on 04/06/2018.     Medical Diagnosis:   M25.571 (ICD-10-CM) - Acute right ankle pain  Z96.7,Z87.81 (ICD-10-CM) - S/P ORIF (open reduction internal fixation) fracture  S82.871A (ICD-10-CM) - Closed right pilon fracture, initial encounter  Evaluation Date: 6/27/2018  Authorization Period Expiration: 12/31/18  Plan of Care Certification Period: 8/27/18  Visit # / Visits authorized: 1/ 20     Time In: 1320  Time Out: 1420  Total Billable Time: 40 minutes (2 TE, 1 MT)   Precautions: WBAT R LE    Subjective   Presents to PT for his first f/u appointment.  Transferred from [facility] due to living closer to [facility]     Pt reports: he was compliant with home exercise program given last session.   Response to previous treatment:-  Functional change: -  Pain: 7/10  Location: Left heel    Objective   O: Bootwalker donned; unable to place heel flat in the foot.  Added heel lift.       R ankle DF PROM (-) 7.  Non-pitting edema.  Very guarded with WB.    VISIT 2/20   FOTO 2/5       Seated heel slides 3x10 with 5 second holds   4 way ankle AAROM 3x10   Standing WS In RW; 2x10 reps   Standing marching In RW; 3x10   Standing forward lunge WS In RW to 4" step; 3x10               INITIALS STEVEN Gaitan received therapeutic exercises to develop strength, endurance, ROM and flexibility for 30' minutes with PT 1:1 and 10' under supervision; see log sheet.    Arnie received the following manual therapy " techniques including the following: PT provided grade III/IV right ankle talar posterior mobilizations and medial/lateral calcaneal mobilizations; 5-7 bouts each for 15-30 seconds each.  PT provided STM and manual stretching to gastroc/soleus complex.  Total time 15'    Arnie received cold pack for 5' minutes to R ankle complex elevated while on a DF creep stretch using strap to decrease circulation, pain, and swelling.    Home Exercises Provided and Patient Education Provided   Education provided:   - seated ankle therex, stopping sleeping with a towel under his right knee, stretching throughout the day  - WBAT exercises without boot using RW.     Written Home Exercises Provided: not today  Exercises were reviewed and Arnie was able to demonstrate them prior to the end of the session.  Arnie demonstrated fair  understanding of the education provided.     Pt received a written copy of exercises to perform at home.   See EMR under Media for exercises given.  Arnie demonstrated good  understanding of the education provided.     Assessment   Now 12 weeks post-op R ankle ORIF for Pilon fx.  [facility]  Discussed transition to split time with tennis shoes next visit.  Recommended AirCast splint for transition out of boot.    Arnie is progressing well towards his goals.   Pt prognosis is Excellent.     Pt will continue to benefit from skilled outpatient physical therapy to address the deficits listed in the problem list box on initial evaluation, provide pt/family education and to maximize pt's level of independence in the home and community environment.     Pt's spiritual, cultural and educational needs considered and pt agreeable to plan of care and goals.    Anticipated barriers to physical therapy: smoking    Goals:   Short Term Goals: 4 weeks   1. Independent with HEP.  2. Increased R knee AROM 0 to 120 deg  3. Report decreased R ankle pain < or =  6/10 with adls such as prolonged standing, sit  to stand dressing, and walking.  4. Increase R ankle dorsiflexion to neutral  5. Increased MMT for B LE by 1 muscle grade to promote proper pelvic stability to decrease R ankle pain < or =  6/10 with adls such as prolonged standing, sit to stand dressing, and walking.   6. Increased flexibility in B hamstrings to -20 deg with 90/90 test to promote proper pelvic stability to decrease R ankle pain < or =  6/10 with adls such as prolonged standing, sit to stand dressing, and walking.  Long Term Goals: 8 weeks   7. I community ambulation with normalized gait pattern.  8. Increase R ankle dorsiflexion to 10 deg, R ankle inversion to 25 deg, and R ankle eversion to 15 deg.  9. Report decreased R ankle pain < or =  3/10 with adls such as prolonged standing, sit to stand dressing, and walking.  10. Increased MMT for B LE to 5/5 muscle grade to promote proper pelvic stability to decrease R ankle pain < or =  3/10 with adls such as prolonged standing, sit to stand dressing, and walking.   11. Increased flexibility in B hip adductors, B piriformis, B hip flexors, B IT bands, and B quads to promote proper pelvic stability to decrease R ankle pain < or =  3/10 with adls such as prolonged standing, sit to stand dressing, and walking.  12. Patient to achieve CK (at least 40% < 60% impaired, limited or restricted) level on the FOTO Outcomes Measurement System.    Plan   Phase II of post-op ankle rehab.  Increase WB activities as he tolerates.     Bobby Juan, PT

## 2018-07-09 PROBLEM — G89.18 POST-OPERATIVE PAIN: Status: RESOLVED | Noted: 2018-04-07 | Resolved: 2018-07-09

## 2018-07-10 ENCOUNTER — CLINICAL SUPPORT (OUTPATIENT)
Dept: REHABILITATION | Facility: HOSPITAL | Age: 48
End: 2018-07-10
Payer: COMMERCIAL

## 2018-07-10 DIAGNOSIS — R29.898 ANKLE WEAKNESS: ICD-10-CM

## 2018-07-10 DIAGNOSIS — M25.571 ACUTE RIGHT ANKLE PAIN: ICD-10-CM

## 2018-07-10 DIAGNOSIS — M25.673 DECREASED RANGE OF MOTION OF ANKLE: Primary | ICD-10-CM

## 2018-07-10 DIAGNOSIS — R26.89 IMPAIRED GAIT AND MOBILITY: ICD-10-CM

## 2018-07-10 NOTE — PROGRESS NOTES
Pt arrived to session 30 min late, due to work delays. Pt inquired about rescheduling for a full session on another date. Scheduling possible and arranged. Pt questioned about what activities to do at home and use of CAM boot. I advised pt to adhere to physician recommendation of boot use at home and WBAT. Pt agreed and will continue WHEP within tolerance.   Devon Cook, PTA

## 2018-07-26 ENCOUNTER — TELEPHONE (OUTPATIENT)
Dept: ORTHOPEDICS | Facility: CLINIC | Age: 48
End: 2018-07-26

## 2018-07-26 NOTE — TELEPHONE ENCOUNTER
Notified pt wife; regarding appointment that was reschedule by pt wife for 08/14/18 at 7:15 am. Patient wife states verbal understanding and has no further questions.

## 2018-07-31 ENCOUNTER — CLINICAL SUPPORT (OUTPATIENT)
Dept: REHABILITATION | Facility: HOSPITAL | Age: 48
End: 2018-07-31
Payer: COMMERCIAL

## 2018-07-31 DIAGNOSIS — R29.898 ANKLE WEAKNESS: ICD-10-CM

## 2018-07-31 DIAGNOSIS — R26.89 IMPAIRED GAIT AND MOBILITY: ICD-10-CM

## 2018-07-31 DIAGNOSIS — M25.571 ACUTE RIGHT ANKLE PAIN: ICD-10-CM

## 2018-07-31 DIAGNOSIS — M25.673 DECREASED RANGE OF MOTION OF ANKLE: Primary | ICD-10-CM

## 2018-07-31 PROCEDURE — 97110 THERAPEUTIC EXERCISES: CPT | Mod: PN

## 2018-07-31 PROCEDURE — 97164 PT RE-EVAL EST PLAN CARE: CPT | Mod: PN
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
## 2018-08-07 ENCOUNTER — CLINICAL SUPPORT (OUTPATIENT)
Dept: REHABILITATION | Facility: HOSPITAL | Age: 48
End: 2018-08-07
Payer: COMMERCIAL

## 2018-08-07 DIAGNOSIS — M25.673 DECREASED RANGE OF MOTION OF ANKLE: Primary | ICD-10-CM

## 2018-08-07 DIAGNOSIS — R26.89 IMPAIRED GAIT AND MOBILITY: ICD-10-CM

## 2018-08-07 DIAGNOSIS — M25.571 ACUTE RIGHT ANKLE PAIN: ICD-10-CM

## 2018-08-07 DIAGNOSIS — R29.898 ANKLE WEAKNESS: ICD-10-CM

## 2018-08-07 PROCEDURE — 97110 THERAPEUTIC EXERCISES: CPT | Mod: PN

## 2018-08-07 PROCEDURE — 97140 MANUAL THERAPY 1/> REGIONS: CPT | Mod: PN

## 2018-08-07 NOTE — PROGRESS NOTES
Physical Therapy Daily Treatment Note     Name: Arnie Barrientos III  Clinic Number: [id number]    Therapy Diagnosis:   Encounter Diagnoses   Name Primary?    Impaired gait and mobility     Decreased range of motion of ankle Yes    Ankle weakness     Acute right ankle pain      Physician: Dolores Storey PA-C    Visit Date: 8/7/2018  Physician Orders: PT Eval and Treat Evaluate and treat 2-3 x per week x 6 weeks. Open treatment of right tibial pilon fracture with reduction and internal fixation without fibula by Dr. Masterson  on 04/06/2018.  Medical Diagnosis:   M25.571 (ICD-10-CM) - Acute right ankle pain  Z96.7,Z87.81 (ICD-10-CM) - S/P ORIF (open reduction internal fixation) fracture  S82.871A (ICD-10-CM) - Closed right pilon fracture, initial encounter  Evaluation Date: 6/27/2018  Authorization Period Expiration: 12/31/18  Plan of Care Certification Period: 8/27/18  Visit # / Visits authorized: 4/ 20     Time In: 0825  Time Out: 0920  Total Billable Time: 30 minutes (1 TE, 1 MT)   Precautions: WBAT R LE    Subjective   Mr. Barrientos presents to PT today ambulating in CAM bootwalker    Pt reports: he was compliant with home exercise program given last session.   Response to previous treatment: states that he feels like his ankle is getting better.  Does states that he is walking around his home without the bootwalker.  He is scared to go without it at work.  Functional change: -  Pain: 6/10  Location: Left heel especially at night    Objective   O: R ankle DF PROM 4 degrees    VISIT 08/06/2018   FOTO 4/5       HS stretch Next    Gastroc and soleus stretch Fitter 5 x 30 seconds   Quad sets With A/SLR   SLR 3x10 with 2# ankle weight   SL hip abduction 3x10 with 2# ankle weight    4 way ankle AROM 20x R   Standing WS -   Standing marching -   Standing forward lunge WS 2x20 reps at the stairs   Heel raises Single leg on Shuttle; 3x10   Mini squats 3x10       INITIALS STEVEN     Arnie received therapeutic exercises to  develop strength, endurance, ROM and flexibility for 15'' minutes with PT 1:1 and 10' supervised.  Gait training x 10' on smooth level surface barefoot in bouts of 40'; cued to activate forefoot rocker.      Arnie received the following manual therapy techniques including the following:grade III/IV right ankle talar posterior mobilizations and medial/lateral calcaneal mobilizations; 5-7 bouts for 15-30 seconds each.  Total time 15'       Home Exercises Provided and Patient Education Provided   Education provided:   - Continue HEP  - wean to shoe     Written Home Exercises Provided: not today  Exercises were reviewed and Arnie was able to demonstrate them prior to the end of the session.  Arnie demonstrated fair  understanding of the education provided.     Pt received a written copy of exercises to perform at home.   See EMR under Media for exercises given.  Arnie demonstrated good  understanding of the education provided.     Assessment   S/p R Pilon ankle fracture with ORIF; post-op 4 months.  Patient again reported to PT in bootwalker.  Encouraged wearing his tennis shoes to PT and reiterated the need for weaning out of his bootwalker.  Significant disuse calf and quadriceps atrophy.     Arnie is progressing well towards his goals.   Pt prognosis is Excellent.     Pt will continue to benefit from skilled outpatient physical therapy to address the deficits listed in the problem list box on initial evaluation, provide pt/family education and to maximize pt's level of independence in the home and community environment.   Pt's spiritual, cultural and educational needs considered and pt agreeable to plan of care and goals.    Anticipated barriers to physical therapy: smoking    Plan   Phase II of post-op ankle rehab.  Increase WB activities as he tolerates.     Goals:   Short Term Goals: 4 weeks   1. Independent with HEP.  2. Increased R knee AROM 0 to 120 deg  3. Report decreased R ankle pain < or =  6/10 with adls  such as prolonged standing, sit to stand dressing, and walking.  4. Increase R ankle dorsiflexion to neutral  5. Increased MMT for B LE by 1 muscle grade to promote proper pelvic stability to decrease R ankle pain < or =  6/10 with adls such as prolonged standing, sit to stand dressing, and walking.   6. Increased flexibility in B hamstrings to -20 deg with 90/90 test to promote proper pelvic stability to decrease R ankle pain < or =  6/10 with adls such as prolonged standing, sit to stand dressing, and walking.  Long Term Goals: 8 weeks   7. I community ambulation with normalized gait pattern.  8. Increase R ankle dorsiflexion to 10 deg, R ankle inversion to 25 deg, and R ankle eversion to 15 deg.  9. Report decreased R ankle pain < or =  3/10 with adls such as prolonged standing, sit to stand dressing, and walking.  10. Increased MMT for B LE to 5/5 muscle grade to promote proper pelvic stability to decrease R ankle pain < or =  3/10 with adls such as prolonged standing, sit to stand dressing, and walking.   11. Increased flexibility in B hip adductors, B piriformis, B hip flexors, B IT bands, and B quads to promote proper pelvic stability to decrease R ankle pain < or =  3/10 with adls such as prolonged standing, sit to stand dressing, and walking.  12. Patient to achieve CK (at least 40% < 60% impaired, limited or restricted) level on the FOTO Outcomes Measurement System.      oBbby Juan, PT

## 2018-08-14 ENCOUNTER — OFFICE VISIT (OUTPATIENT)
Dept: ORTHOPEDICS | Facility: CLINIC | Age: 48
End: 2018-08-14
Payer: COMMERCIAL

## 2018-08-14 ENCOUNTER — HOSPITAL ENCOUNTER (OUTPATIENT)
Dept: RADIOLOGY | Facility: HOSPITAL | Age: 48
Discharge: HOME | End: 2018-08-14
Attending: PHYSICIAN ASSISTANT
Payer: COMMERCIAL

## 2018-08-14 VITALS
SYSTOLIC BLOOD PRESSURE: 102 MMHG | HEIGHT: 69 IN | BODY MASS INDEX: 25.93 KG/M2 | HEART RATE: 76 BPM | WEIGHT: 175.06 LBS | DIASTOLIC BLOOD PRESSURE: 68 MMHG | RESPIRATION RATE: 18 BRPM

## 2018-08-14 DIAGNOSIS — S82.871A CLOSED RIGHT PILON FRACTURE, INITIAL ENCOUNTER: ICD-10-CM

## 2018-08-14 DIAGNOSIS — M25.571 ACUTE RIGHT ANKLE PAIN: ICD-10-CM

## 2018-08-14 DIAGNOSIS — Z87.81 S/P ORIF (OPEN REDUCTION INTERNAL FIXATION) FRACTURE: Primary | ICD-10-CM

## 2018-08-14 DIAGNOSIS — Z98.890 S/P ORIF (OPEN REDUCTION INTERNAL FIXATION) FRACTURE: Primary | ICD-10-CM

## 2018-08-14 DIAGNOSIS — Z09 S/P ORTHOPEDIC SURGERY, FOLLOW-UP EXAM: ICD-10-CM

## 2018-08-14 PROCEDURE — 73610 X-RAY EXAM OF ANKLE: CPT | Mod: 26,RT,, | Performed by: RADIOLOGY

## 2018-08-14 PROCEDURE — 99999 PR PBB SHADOW E&M-EST. PATIENT-LVL III: CPT | Mod: PBBFAC,,, | Performed by: PHYSICIAN ASSISTANT

## 2018-08-14 PROCEDURE — 73610 X-RAY EXAM OF ANKLE: CPT | Mod: TC,RT

## 2018-08-14 PROCEDURE — 99213 OFFICE O/P EST LOW 20 MIN: CPT | Mod: S$GLB,,, | Performed by: PHYSICIAN ASSISTANT

## 2018-08-14 PROCEDURE — 3008F BODY MASS INDEX DOCD: CPT | Mod: CPTII,S$GLB,, | Performed by: PHYSICIAN ASSISTANT
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
## 2018-08-15 ENCOUNTER — PATIENT MESSAGE (OUTPATIENT)
Dept: ORTHOPEDICS | Facility: CLINIC | Age: 48
End: 2018-08-15

## 2018-08-16 DIAGNOSIS — Z87.81 S/P ORIF (OPEN REDUCTION INTERNAL FIXATION) FRACTURE: ICD-10-CM

## 2018-08-16 DIAGNOSIS — Z98.890 S/P ORIF (OPEN REDUCTION INTERNAL FIXATION) FRACTURE: ICD-10-CM

## 2018-08-16 DIAGNOSIS — S82.871A CLOSED RIGHT PILON FRACTURE, INITIAL ENCOUNTER: Primary | ICD-10-CM

## 2018-08-16 RX ORDER — TRAMADOL HYDROCHLORIDE 50 MG/1
50 TABLET ORAL
Qty: 60 TABLET | Refills: 0 | Status: SHIPPED | OUTPATIENT
Start: 2018-08-16 | End: 2018-08-26

## 2018-08-24 ENCOUNTER — CLINICAL SUPPORT (OUTPATIENT)
Dept: REHABILITATION | Facility: HOSPITAL | Age: 48
End: 2018-08-24
Payer: COMMERCIAL

## 2018-08-24 DIAGNOSIS — G89.29 CHRONIC PAIN OF RIGHT ANKLE: ICD-10-CM

## 2018-08-24 DIAGNOSIS — M25.571 CHRONIC PAIN OF RIGHT ANKLE: ICD-10-CM

## 2018-08-24 DIAGNOSIS — R29.898 ANKLE WEAKNESS: ICD-10-CM

## 2018-08-24 DIAGNOSIS — M25.673 DECREASED RANGE OF MOTION OF ANKLE: Primary | ICD-10-CM

## 2018-08-24 DIAGNOSIS — R26.89 IMPAIRED GAIT AND MOBILITY: ICD-10-CM

## 2018-08-24 PROCEDURE — 97110 THERAPEUTIC EXERCISES: CPT | Mod: PN

## 2018-08-24 PROCEDURE — 97140 MANUAL THERAPY 1/> REGIONS: CPT | Mod: PN

## 2018-08-24 NOTE — PROGRESS NOTES
Physical Therapy Daily Treatment Note     Name: Arnie Barrientos III  Clinic Number: [id number]    Therapy Diagnosis:   No diagnosis found.  Physician: Dolores Storey PA-C    Visit Date: 8/24/2018  Physician Orders: PT Eval and Treat Evaluate and treat 2-3 x per week x 6 weeks. Open treatment of right tibial pilon fracture with reduction and internal fixation without fibula by Dr. Masterson  on 04/06/2018.  Medical Diagnosis:   M25.571 (ICD-10-CM) - Acute right ankle pain  Z96.7,Z87.81 (ICD-10-CM) - S/P ORIF (open reduction internal fixation) fracture  S82.871A (ICD-10-CM) - Closed right pilon fracture, initial encounter  Evaluation Date: 6/27/2018  Authorization Period Expiration: 12/31/18  Plan of Care Certification Period: 8/27/18  Visit # / Visits authorized: 5/ 20     Time In: 7:05  Time Out: 8:00  Total Billable Time: 55 minutes (3 TE, 1 MT)   Precautions: WBAT R LE    Subjective   Mr. Barrientos presents to PT today ambulating in tennis show; he reports that he walked 3 miles yesterday without the boot on and that his foot has been feeling the best its been since 2 days ago.  Pt reports: he was compliant with home exercise program given last session.   Response to previous treatment: has not bene wearing boot at work anymore and feels that he is walking better  Functional change: -  Pain: 4/10  Location: Left heel especially at night    Objective   O:  R ankle DF A/PROM: 3(4)  R ankle PF A/PROM: 28(29)  R knee A/PROM: 4(2)-130(132)    VISIT 8/24/18 08/06/2018   FOTO 5/5 DONE 4/5        HS stretch 3x30'' R Next    Gastroc and soleus stretch  Fitter 5 x 30 seconds   Quad sets -- With A/SLR   SLR 2x15 2# R 3x10 with 2# ankle weight   SL hip abduction NT 3x10 with 2# ankle weight    4 way ankle AROM 30x R RTB 20x R   Standing WS -- -   Standing marching -- -   Standing forward lunge WS  2x20 reps at the stairs   Heel raises 3x10 standing  Shuttle next Single leg on Shuttle; 3x10   Mini squats 30x 3x10   Lunges  10x5'' R    INITIALS RAMSEY STEVEN Gaitan received therapeutic exercises to develop strength, endurance, ROM and flexibility for 40' including measurements 1:1 with PT.  Arnie received the following manual therapy techniques including the following: R knee extension stretch passive, grade III/IV right ankle talar anterior to posterior mobilizations, R ankle TC distraction with eversion + dorsiflexion, and medial/lateral calcaneal mobilizations; 5-7 bouts for 15-30 seconds each.  Total time 15'       Home Exercises Provided and Patient Education Provided   Education provided:   - Continue HEP, RTB given today and forward lunges added today for HEP  - wean to shoe     Written Home Exercises Provided: not today  Exercises were reviewed and Arnie was able to demonstrate them prior to the end of the session.  Arnie demonstrated fair  understanding of the education provided.     Pt received a written copy of exercises to perform at home.   See EMR under Media for exercises given.  Arnie demonstrated good  understanding of the education provided.     Assessment   S/p R Pilon ankle fracture with ORIF; post-op about 5 months. Significant disuse calf and quadriceps atrophy.   Pt cont to demo moderate R TC joint DF restriction, and  Slight pain initially in R knee during passive R knee stretching. Pt given RTB and added lunges on HEP. Pt reported he will be back next week. Decrease in pain and loosening of R ankle per pt report after session today.    Arnie is progressing well towards his goals.   Pt prognosis is Excellent.     Pt will continue to benefit from skilled outpatient physical therapy to address the deficits listed in the problem list box on initial evaluation, provide pt/family education and to maximize pt's level of independence in the home and community environment.   Pt's spiritual, cultural and educational needs considered and pt agreeable to plan of care and goals.    Anticipated barriers to physical therapy:  smoking    Plan   Phase II of post-op ankle rehab.  Increase WB activities as he tolerates.     Goals:   Short Term Goals: 4 weeks   1. Independent with HEP.  2. Increased R knee AROM 0 to 120 deg  3. Report decreased R ankle pain < or =  6/10 with adls such as prolonged standing, sit to stand dressing, and walking.  4. Increase R ankle dorsiflexion to neutral  5. Increased MMT for B LE by 1 muscle grade to promote proper pelvic stability to decrease R ankle pain < or =  6/10 with adls such as prolonged standing, sit to stand dressing, and walking.   6. Increased flexibility in B hamstrings to -20 deg with 90/90 test to promote proper pelvic stability to decrease R ankle pain < or =  6/10 with adls such as prolonged standing, sit to stand dressing, and walking.  Long Term Goals: 8 weeks   7. I community ambulation with normalized gait pattern.  8. Increase R ankle dorsiflexion to 10 deg, R ankle inversion to 25 deg, and R ankle eversion to 15 deg.  9. Report decreased R ankle pain < or =  3/10 with adls such as prolonged standing, sit to stand dressing, and walking.  10. Increased MMT for B LE to 5/5 muscle grade to promote proper pelvic stability to decrease R ankle pain < or =  3/10 with adls such as prolonged standing, sit to stand dressing, and walking.   11. Increased flexibility in B hip adductors, B piriformis, B hip flexors, B IT bands, and B quads to promote proper pelvic stability to decrease R ankle pain < or =  3/10 with adls such as prolonged standing, sit to stand dressing, and walking.  12. Patient to achieve CK (at least 40% < 60% impaired, limited or restricted) level on the FOTO Outcomes Measurement System.      Vinayak Light, PT

## 2018-09-05 ENCOUNTER — DOCUMENTATION ONLY (OUTPATIENT)
Dept: REHABILITATION | Facility: HOSPITAL | Age: 48
End: 2018-09-05

## 2018-09-05 DIAGNOSIS — R26.89 IMPAIRED GAIT AND MOBILITY: ICD-10-CM

## 2018-09-05 DIAGNOSIS — R29.898 ANKLE WEAKNESS: ICD-10-CM

## 2018-09-05 DIAGNOSIS — G89.29 CHRONIC PAIN OF RIGHT ANKLE: ICD-10-CM

## 2018-09-05 DIAGNOSIS — M25.571 CHRONIC PAIN OF RIGHT ANKLE: ICD-10-CM

## 2018-09-05 DIAGNOSIS — M25.673 DECREASED RANGE OF MOTION OF ANKLE: Primary | ICD-10-CM

## 2018-09-05 NOTE — PROGRESS NOTES
Face to Face PTA Conference performed with  Marlys Monzon PTA, Soraida Espana PTA, Devon Cook PTA Darnell Matias PTA regarding patient's current status, overall progress, and plan of care    Vinayak Light, PT    Face to face meeting completed with Vinayak Light PT regarding current status and progress of   Arnie J Floyd III .  Devon Cook PTA     Face to face meeting completed with Vinayak Light PT regarding current status and progress of   Arnie J Floyd III .  Darnell Matias, STEFAN    Face to face meeting completed with Vinayak Light PT regarding current status and progress of   Arnie J Floyd III .  Marlys Monzon PTA    Face to face meeting completed with Vinayak Light, PT regarding current status and progress of   Arnie J Floyd III .  Soraida Espana PTA

## 2018-09-11 ENCOUNTER — TELEPHONE (OUTPATIENT)
Dept: REHABILITATION | Facility: HOSPITAL | Age: 48
End: 2018-09-11

## 2018-09-11 DIAGNOSIS — M25.571 CHRONIC PAIN OF RIGHT ANKLE: ICD-10-CM

## 2018-09-11 DIAGNOSIS — R26.89 IMPAIRED GAIT AND MOBILITY: ICD-10-CM

## 2018-09-11 DIAGNOSIS — M25.673 DECREASED RANGE OF MOTION OF ANKLE: Primary | ICD-10-CM

## 2018-09-11 DIAGNOSIS — G89.29 CHRONIC PAIN OF RIGHT ANKLE: ICD-10-CM

## 2018-09-11 DIAGNOSIS — R29.898 ANKLE WEAKNESS: ICD-10-CM

## 2018-09-18 ENCOUNTER — PATIENT MESSAGE (OUTPATIENT)
Dept: ORTHOPEDICS | Facility: CLINIC | Age: 48
End: 2018-09-18

## 2018-09-18 DIAGNOSIS — G89.18 POST-OPERATIVE PAIN: Primary | ICD-10-CM

## 2018-09-18 RX ORDER — TRAMADOL HYDROCHLORIDE 50 MG/1
50 TABLET ORAL
Qty: 60 TABLET | Refills: 0 | Status: SHIPPED | OUTPATIENT
Start: 2018-09-18 | End: 2018-09-28

## 2018-10-25 ENCOUNTER — PATIENT MESSAGE (OUTPATIENT)
Dept: ORTHOPEDICS | Facility: CLINIC | Age: 48
End: 2018-10-25

## 2019-04-18 ENCOUNTER — OFFICE VISIT (OUTPATIENT)
Dept: URGENT CARE | Facility: CLINIC | Age: 49
End: 2019-04-18
Payer: COMMERCIAL

## 2019-04-18 VITALS
BODY MASS INDEX: 25.18 KG/M2 | HEIGHT: 69 IN | DIASTOLIC BLOOD PRESSURE: 71 MMHG | TEMPERATURE: 98 F | WEIGHT: 170 LBS | HEART RATE: 82 BPM | RESPIRATION RATE: 18 BRPM | OXYGEN SATURATION: 99 % | SYSTOLIC BLOOD PRESSURE: 115 MMHG

## 2019-04-18 DIAGNOSIS — K04.7 DENTAL ABSCESS: Primary | ICD-10-CM

## 2019-04-18 PROCEDURE — 99203 OFFICE O/P NEW LOW 30 MIN: CPT | Mod: S$GLB,,, | Performed by: PHYSICIAN ASSISTANT

## 2019-04-18 PROCEDURE — 3008F BODY MASS INDEX DOCD: CPT | Mod: CPTII,S$GLB,, | Performed by: PHYSICIAN ASSISTANT

## 2019-04-18 PROCEDURE — 99203 PR OFFICE/OUTPT VISIT, NEW, LEVL III, 30-44 MIN: ICD-10-PCS | Mod: S$GLB,,, | Performed by: PHYSICIAN ASSISTANT

## 2019-04-18 PROCEDURE — 3008F PR BODY MASS INDEX (BMI) DOCUMENTED: ICD-10-PCS | Mod: CPTII,S$GLB,, | Performed by: PHYSICIAN ASSISTANT

## 2019-04-18 RX ORDER — IBUPROFEN 800 MG/1
800 TABLET ORAL EVERY 8 HOURS PRN
Qty: 40 TABLET | Refills: 0 | Status: SHIPPED | OUTPATIENT
Start: 2019-04-18 | End: 2020-04-17

## 2019-04-18 RX ORDER — CLINDAMYCIN HYDROCHLORIDE 300 MG/1
300 CAPSULE ORAL 3 TIMES DAILY
Qty: 21 CAPSULE | Refills: 0 | Status: SHIPPED | OUTPATIENT
Start: 2019-04-18 | End: 2019-04-25

## 2019-04-19 NOTE — PATIENT INSTRUCTIONS
"  Dental Abscess    An abscess is a pocket of pus at the tip of a tooth root in your jaw bone. It is caused by an infection at the root of the tooth. It can cause pain and swelling of the gum, cheek, or jaw. Pain may spread from the tooth to your ear or the area of your jaw on the same side. If the abscess isnt treated, it appears as a bubble or swelling on the gum near the tooth. The pressure that builds in this swelling is the source of the pain. More serious infections cause your face to swell.  An abscess can be caused by a crack in the tooth, a cavity, a gum infection, or a combination of these. Once the pulp of the tooth is exposed, bacteria can spread down the roots to the tip. If the bacteria are not stopped, they can damage the bone and soft tissue, and an abscess can form.  Home care  Follow these guidelines when caring for yourself at home:  · Avoid hot and cold foods and drinks. Your tooth may be sensitive to changes in temperature. Dont chew on the side of the infected tooth.  · If your tooth is chipped or cracked, or if there is a large open cavity, put oil of cloves directly on the tooth to relieve pain. You can buy oil of cloves at drugstores. Some pharmacies carry an over-the-counter "toothache kit." This contains a paste that you can put on the exposed tooth to make it less sensitive.  · Put a cold pack on your jaw over the sore area to help reduce pain.  · You may use over-the-counter medicine to ease pain, unless another medicine was prescribed. If you have chronic liver or kidney disease, talk with your healthcare provider before using acetaminophen or ibuprofen. Also talk with your provider if youve had a stomach ulcer or GI bleeding.  · An antibiotic will be prescribed. Take it until finished, even if you are feeling better after a few days.  Follow-up care  Follow up with your dentist or an oral surgeon, or as advised. Once an infection occurs in a tooth, it will continue to be a problem " until the infection is drained. This is done through surgery or a root canal. Or you may need to have your tooth pulled.  Call 911  Call 911 if any of these occur:  · Unusual drowsiness  · Headache or stiff neck  · Weakness or fainting  · Difficulty swallowing, breathing, or opening your mouth  · Swollen eyelids  When to seek medical advice  Call your healthcare provider right away if any of these occur:  · Your face becomes more swollen or red  · Pain gets worse or spreads to your neck  · Fever of 100.4º F (38.0º C) or higher, or as directed by your healthcare provider  · Pus drains from the tooth  Date Last Reviewed: 10/1/2016  © 7200-4345 [employer]. [address] [place] [address], [place] [address]. All rights reserved. This information is not intended as a substitute for professional medical care. Always follow your healthcare professional's instructions.      FU WITH YOUR DENTIST AS DISCUSSED.     Please follow up with your Primary care provider within 2-5 days if your signs and symptoms have not resolved or worsen.     If your condition worsens or fails to improve we recommend that you receive another evaluation at the emergency room immediately or contact your primary medical clinic to discuss your concerns.   You must understand that you have received an Urgent Care treatment only and that you may be released before all of your medical problems are known or treated. You, the patient, will arrange for follow up care as instructed.     RED FLAGS/WARNING SYMPTOMS DISCUSSED WITH PATIENT THAT WOULD WARRANT EMERGENT MEDICAL ATTENTION. PATIENT VERBALIZED UNDERSTANDING.

## 2019-04-19 NOTE — PROGRESS NOTES
"Subjective:       Patient ID: Arnie Barrientos III is a 49 y.o. male.    Vitals:  height is 5' 9" (1.753 m) and weight is 77.1 kg (170 lb). His temperature is 98.1 °F (36.7 °C). His blood pressure is 115/71 and his pulse is 82. His respiration is 18 and oxygen saturation is 99%.     Chief Complaint: Dental Pain    Dental Pain    This is a new problem. The current episode started in the past 7 days (2 Days Ago ). The problem occurs constantly. The problem has been gradually worsening. The pain is at a severity of 4/10. The pain is mild. Pertinent negatives include no fever. He has tried nothing for the symptoms. The treatment provided no relief.       Constitution: Negative for chills, fatigue and fever.   HENT: Positive for ear pain. Negative for ear discharge, foreign body in ear, tinnitus, hearing loss, congestion and sore throat.    Neck: Negative for painful lymph nodes.   Cardiovascular: Negative for chest pain and leg swelling.   Eyes: Negative for eye pain, double vision and blurred vision.   Respiratory: Negative for cough and shortness of breath.    Gastrointestinal: Negative for nausea, vomiting and diarrhea.   Genitourinary: Negative for dysuria, frequency and urgency.   Musculoskeletal: Negative for joint pain, joint swelling, muscle cramps and muscle ache.   Skin: Negative for color change, pale and rash.   Allergic/Immunologic: Negative for seasonal allergies.   Neurological: Negative for dizziness, history of vertigo, light-headedness, passing out and headaches.   Hematologic/Lymphatic: Negative for swollen lymph nodes, easy bruising/bleeding and history of blood clots. Does not bruise/bleed easily.   Psychiatric/Behavioral: Negative for nervous/anxious, sleep disturbance and depression. The patient is not nervous/anxious.        Objective:      Physical Exam   Constitutional: He is oriented to person, place, and time. He appears well-developed and well-nourished. He is cooperative.  Non-toxic " appearance. He does not appear ill. No distress.   HENT:   Head: Normocephalic and atraumatic.   Right Ear: Hearing, tympanic membrane, external ear and ear canal normal.   Left Ear: Hearing, tympanic membrane, external ear and ear canal normal.   Nose: Nose normal. No mucosal edema, rhinorrhea or nasal deformity. No epistaxis. Right sinus exhibits no maxillary sinus tenderness and no frontal sinus tenderness. Left sinus exhibits no maxillary sinus tenderness and no frontal sinus tenderness.   Mouth/Throat: Uvula is midline, oropharynx is clear and moist and mucous membranes are normal. No trismus in the jaw. Normal dentition. Dental abscesses and dental caries present. No uvula swelling. No posterior oropharyngeal erythema.       Eyes: Conjunctivae and lids are normal. No scleral icterus.   Sclera clear bilat   Neck: Trachea normal, full passive range of motion without pain and phonation normal. Neck supple.   Cardiovascular: Normal rate, regular rhythm, normal heart sounds, intact distal pulses and normal pulses.   Pulmonary/Chest: Effort normal and breath sounds normal. No respiratory distress.   Abdominal: Soft. Normal appearance and bowel sounds are normal. He exhibits no distension. There is no tenderness.   Musculoskeletal: Normal range of motion. He exhibits no edema or deformity.   Lymphadenopathy:        Head (right side): No submental, no submandibular, no tonsillar, no preauricular, no posterior auricular and no occipital adenopathy present.        Head (left side): No submental, no submandibular, no tonsillar, no preauricular, no posterior auricular and no occipital adenopathy present.     He has no cervical adenopathy.        Right cervical: No superficial cervical, no deep cervical and no posterior cervical adenopathy present.       Left cervical: No superficial cervical, no deep cervical and no posterior cervical adenopathy present.   Neurological: He is alert and oriented to person, place, and time.  "He exhibits normal muscle tone. Coordination normal.   Skin: Skin is warm, dry and intact. He is not diaphoretic. No pallor.   Psychiatric: He has a normal mood and affect. His speech is normal and behavior is normal. Judgment and thought content normal. Cognition and memory are normal.   Nursing note and vitals reviewed.      Assessment:       1. Dental abscess        Plan:         Dental abscess  -     clindamycin (CLEOCIN) 300 MG capsule; Take 1 capsule (300 mg total) by mouth 3 (three) times daily. for 7 days  Dispense: 21 capsule; Refill: 0  -     ibuprofen (ADVIL,MOTRIN) 800 MG tablet; Take 1 tablet (800 mg total) by mouth every 8 (eight) hours as needed.  Dispense: 40 tablet; Refill: 0          Dental Abscess    An abscess is a pocket of pus at the tip of a tooth root in your jaw bone. It is caused by an infection at the root of the tooth. It can cause pain and swelling of the gum, cheek, or jaw. Pain may spread from the tooth to your ear or the area of your jaw on the same side. If the abscess isnt treated, it appears as a bubble or swelling on the gum near the tooth. The pressure that builds in this swelling is the source of the pain. More serious infections cause your face to swell.  An abscess can be caused by a crack in the tooth, a cavity, a gum infection, or a combination of these. Once the pulp of the tooth is exposed, bacteria can spread down the roots to the tip. If the bacteria are not stopped, they can damage the bone and soft tissue, and an abscess can form.  Home care  Follow these guidelines when caring for yourself at home:  · Avoid hot and cold foods and drinks. Your tooth may be sensitive to changes in temperature. Dont chew on the side of the infected tooth.  · If your tooth is chipped or cracked, or if there is a large open cavity, put oil of cloves directly on the tooth to relieve pain. You can buy oil of cloves at drugstores. Some pharmacies carry an over-the-counter "toothache kit." " This contains a paste that you can put on the exposed tooth to make it less sensitive.  · Put a cold pack on your jaw over the sore area to help reduce pain.  · You may use over-the-counter medicine to ease pain, unless another medicine was prescribed. If you have chronic liver or kidney disease, talk with your healthcare provider before using acetaminophen or ibuprofen. Also talk with your provider if youve had a stomach ulcer or GI bleeding.  · An antibiotic will be prescribed. Take it until finished, even if you are feeling better after a few days.  Follow-up care  Follow up with your dentist or an oral surgeon, or as advised. Once an infection occurs in a tooth, it will continue to be a problem until the infection is drained. This is done through surgery or a root canal. Or you may need to have your tooth pulled.  Call 911  Call 911 if any of these occur:  · Unusual drowsiness  · Headache or stiff neck  · Weakness or fainting  · Difficulty swallowing, breathing, or opening your mouth  · Swollen eyelids  When to seek medical advice  Call your healthcare provider right away if any of these occur:  · Your face becomes more swollen or red  · Pain gets worse or spreads to your neck  · Fever of 100.4º F (38.0º C) or higher, or as directed by your healthcare provider  · Pus drains from the tooth  Date Last Reviewed: 10/1/2016  © 0881-6218 [employer]. [address] [place] [address]. All rights reserved. This information is not intended as a substitute for professional medical care. Always follow your healthcare professional's instructions.      FU WITH YOUR DENTIST AS DISCUSSED.     Please follow up with your Primary care provider within 2-5 days if your signs and symptoms have not resolved or worsen.     If your condition worsens or fails to improve we recommend that you receive another evaluation at the emergency room immediately or contact your primary medical clinic to discuss your  concerns.   You must understand that you have received an Urgent Care treatment only and that you may be released before all of your medical problems are known or treated. You, the patient, will arrange for follow up care as instructed.     RED FLAGS/WARNING SYMPTOMS DISCUSSED WITH PATIENT THAT WOULD WARRANT EMERGENT MEDICAL ATTENTION. PATIENT VERBALIZED UNDERSTANDING.

## 2019-07-07 ENCOUNTER — OFFICE VISIT (OUTPATIENT)
Dept: URGENT CARE | Facility: CLINIC | Age: 49
End: 2019-07-07
Payer: COMMERCIAL

## 2019-07-07 VITALS
HEART RATE: 80 BPM | WEIGHT: 170 LBS | BODY MASS INDEX: 25.18 KG/M2 | SYSTOLIC BLOOD PRESSURE: 143 MMHG | DIASTOLIC BLOOD PRESSURE: 93 MMHG | OXYGEN SATURATION: 99 % | TEMPERATURE: 98 F | HEIGHT: 69 IN | RESPIRATION RATE: 18 BRPM

## 2019-07-07 DIAGNOSIS — K04.7 DENTAL INFECTION: Primary | ICD-10-CM

## 2019-07-07 PROCEDURE — 99213 OFFICE O/P EST LOW 20 MIN: CPT | Mod: S$GLB,,, | Performed by: FAMILY MEDICINE

## 2019-07-07 PROCEDURE — 3008F PR BODY MASS INDEX (BMI) DOCUMENTED: ICD-10-PCS | Mod: CPTII,S$GLB,, | Performed by: FAMILY MEDICINE

## 2019-07-07 PROCEDURE — 3008F BODY MASS INDEX DOCD: CPT | Mod: CPTII,S$GLB,, | Performed by: FAMILY MEDICINE

## 2019-07-07 PROCEDURE — 99213 PR OFFICE/OUTPT VISIT, EST, LEVL III, 20-29 MIN: ICD-10-PCS | Mod: S$GLB,,, | Performed by: FAMILY MEDICINE

## 2019-07-07 RX ORDER — CLINDAMYCIN HYDROCHLORIDE 300 MG/1
300 CAPSULE ORAL EVERY 6 HOURS
Qty: 30 CAPSULE | Refills: 0 | Status: SHIPPED | OUTPATIENT
Start: 2019-07-07 | End: 2021-07-20

## 2019-07-07 RX ORDER — IBUPROFEN 600 MG/1
600 TABLET ORAL EVERY 6 HOURS PRN
Qty: 30 TABLET | Refills: 1 | Status: SHIPPED | OUTPATIENT
Start: 2019-07-07 | End: 2021-07-20

## 2019-07-07 NOTE — PATIENT INSTRUCTIONS
"  Dental Abscess    An abscess is a pocket of pus at the tip of a tooth root in your jaw bone. It is caused by an infection at the root of the tooth. It can cause pain and swelling of the gum, cheek, or jaw. Pain may spread from the tooth to your ear or the area of your jaw on the same side. If the abscess isnt treated, it appears as a bubble or swelling on the gum near the tooth. The pressure that builds in this swelling is the source of the pain. More serious infections cause your face to swell.  An abscess can be caused by a crack in the tooth, a cavity, a gum infection, or a combination of these. Once the pulp of the tooth is exposed, bacteria can spread down the roots to the tip. If the bacteria are not stopped, they can damage the bone and soft tissue, and an abscess can form.  Home care  Follow these guidelines when caring for yourself at home:  · Avoid hot and cold foods and drinks. Your tooth may be sensitive to changes in temperature. Dont chew on the side of the infected tooth.  · If your tooth is chipped or cracked, or if there is a large open cavity, put oil of cloves directly on the tooth to relieve pain. You can buy oil of cloves at drugstores. Some pharmacies carry an over-the-counter "toothache kit." This contains a paste that you can put on the exposed tooth to make it less sensitive.  · Put a cold pack on your jaw over the sore area to help reduce pain.  · You may use over-the-counter medicine to ease pain, unless another medicine was prescribed. If you have chronic liver or kidney disease, talk with your healthcare provider before using acetaminophen or ibuprofen. Also talk with your provider if youve had a stomach ulcer or GI bleeding.  · An antibiotic will be prescribed. Take it until finished, even if you are feeling better after a few days.  Follow-up care  Follow up with your dentist or an oral surgeon, or as advised. Once an infection occurs in a tooth, it will continue to be a problem " until the infection is drained. This is done through surgery or a root canal. Or you may need to have your tooth pulled.  Call 911  Call 911 if any of these occur:  · Unusual drowsiness  · Headache or stiff neck  · Weakness or fainting  · Difficulty swallowing, breathing, or opening your mouth  · Swollen eyelids  When to seek medical advice  Call your healthcare provider right away if any of these occur:  · Your face becomes more swollen or red  · Pain gets worse or spreads to your neck  · Fever of 100.4º F (38.0º C) or higher, or as directed by your healthcare provider  · Pus drains from the tooth  Date Last Reviewed: 10/1/2016  © 3862-6239 [employer]. [address] [place] [address], [place] [address]. All rights reserved. This information is not intended as a substitute for professional medical care. Always follow your healthcare professional's instructions.

## 2019-07-07 NOTE — PROGRESS NOTES
"Subjective:       Patient ID: Arnie Barrientos III is a 49 y.o. male.    Vitals:  height is 5' 9" (1.753 m) and weight is 77.1 kg (170 lb). His temperature is 97.9 °F (36.6 °C). His blood pressure is 143/93 (abnormal) and his pulse is 80. His respiration is 18 and oxygen saturation is 99%.     Chief Complaint: Oral Pain    C/o left lowwer toothache  Off and on, missed dental appointment, denies fever,     Oral Pain    This is a recurrent problem. The current episode started yesterday. The problem occurs constantly. The problem has been gradually worsening. The pain is at a severity of 4/10. The pain is mild. Pertinent negatives include no fever. He has tried nothing for the symptoms.       Constitution: Negative for chills, fatigue and fever.   HENT: Negative for congestion and sore throat.    Neck: Negative for painful lymph nodes.   Cardiovascular: Negative for chest pain and leg swelling.   Eyes: Negative for double vision and blurred vision.   Respiratory: Negative for cough and shortness of breath.    Gastrointestinal: Negative for nausea, vomiting and diarrhea.   Genitourinary: Negative for dysuria, frequency and urgency.   Musculoskeletal: Negative for joint pain, joint swelling, muscle cramps and muscle ache.   Skin: Negative for color change, pale and rash.   Allergic/Immunologic: Negative for seasonal allergies.   Neurological: Negative for dizziness, history of vertigo, light-headedness, passing out and headaches.   Hematologic/Lymphatic: Negative for swollen lymph nodes, easy bruising/bleeding and history of blood clots. Does not bruise/bleed easily.   Psychiatric/Behavioral: Negative for nervous/anxious, sleep disturbance and depression. The patient is not nervous/anxious.        Objective:      Physical Exam   Constitutional: He is oriented to person, place, and time. He appears well-developed and well-nourished. He is cooperative.  Non-toxic appearance. He does not appear ill.   HENT:   Head: " Normocephalic and atraumatic.   Right Ear: Hearing, tympanic membrane, external ear and ear canal normal.   Left Ear: Hearing, tympanic membrane, external ear and ear canal normal.   Nose: Nose normal. No mucosal edema, rhinorrhea or nasal deformity. No epistaxis. Right sinus exhibits no maxillary sinus tenderness and no frontal sinus tenderness. Left sinus exhibits no maxillary sinus tenderness and no frontal sinus tenderness.   Mouth/Throat: Uvula is midline, oropharynx is clear and moist and mucous membranes are normal. No trismus in the jaw. Normal dentition. No uvula swelling. No oropharyngeal exudate or posterior oropharyngeal erythema.   Left lower 2nd molar with cracked tooth, no swelling,    Eyes: Conjunctivae and lids are normal. Right eye exhibits no discharge. Left eye exhibits no discharge. No scleral icterus.   Sclera clear bilat   Neck: Trachea normal, normal range of motion, full passive range of motion without pain and phonation normal. Neck supple.   Cardiovascular: Normal rate, regular rhythm, normal heart sounds, intact distal pulses and normal pulses.   Pulmonary/Chest: Effort normal and breath sounds normal. No respiratory distress.   Abdominal: Soft. Normal appearance and bowel sounds are normal. He exhibits no distension, no pulsatile midline mass and no mass. There is no tenderness.   Musculoskeletal: Normal range of motion. He exhibits no edema or deformity.   Neurological: He is alert and oriented to person, place, and time. He exhibits normal muscle tone. Coordination normal.   Skin: Skin is warm, dry and intact. He is not diaphoretic. No pallor.   Psychiatric: He has a normal mood and affect. His speech is normal and behavior is normal. Judgment and thought content normal. Cognition and memory are normal.   Nursing note and vitals reviewed.      Assessment:       1. Dental infection        Plan:         Dental infection  -     ibuprofen (ADVIL,MOTRIN) 600 MG tablet; Take 1 tablet (600 mg  total) by mouth every 6 (six) hours as needed for Pain.  Dispense: 30 tablet; Refill: 1  -     clindamycin (CLEOCIN) 300 MG capsule; Take 1 capsule (300 mg total) by mouth every 6 (six) hours.  Dispense: 30 capsule; Refill: 0

## 2021-01-28 ENCOUNTER — PATIENT MESSAGE (OUTPATIENT)
Dept: UROLOGY | Facility: CLINIC | Age: 51
End: 2021-01-28

## 2021-01-28 ENCOUNTER — OFFICE VISIT (OUTPATIENT)
Dept: UROLOGY | Facility: CLINIC | Age: 51
End: 2021-01-28
Payer: COMMERCIAL

## 2021-01-28 VITALS
SYSTOLIC BLOOD PRESSURE: 130 MMHG | HEART RATE: 83 BPM | BODY MASS INDEX: 24.53 KG/M2 | DIASTOLIC BLOOD PRESSURE: 87 MMHG | WEIGHT: 166.13 LBS

## 2021-01-28 DIAGNOSIS — N50.89 SCROTAL SWELLING: Primary | ICD-10-CM

## 2021-01-28 DIAGNOSIS — N45.3 EPIDIDYMOORCHITIS: ICD-10-CM

## 2021-01-28 PROCEDURE — 1126F AMNT PAIN NOTED NONE PRSNT: CPT | Mod: S$GLB,,, | Performed by: STUDENT IN AN ORGANIZED HEALTH CARE EDUCATION/TRAINING PROGRAM

## 2021-01-28 PROCEDURE — 1126F PR PAIN SEVERITY QUANTIFIED, NO PAIN PRESENT: ICD-10-PCS | Mod: S$GLB,,, | Performed by: STUDENT IN AN ORGANIZED HEALTH CARE EDUCATION/TRAINING PROGRAM

## 2021-01-28 PROCEDURE — 99999 PR PBB SHADOW E&M-EST. PATIENT-LVL III: CPT | Mod: PBBFAC,,, | Performed by: STUDENT IN AN ORGANIZED HEALTH CARE EDUCATION/TRAINING PROGRAM

## 2021-01-28 PROCEDURE — 99999 PR PBB SHADOW E&M-EST. PATIENT-LVL III: ICD-10-PCS | Mod: PBBFAC,,, | Performed by: STUDENT IN AN ORGANIZED HEALTH CARE EDUCATION/TRAINING PROGRAM

## 2021-01-28 PROCEDURE — 3008F BODY MASS INDEX DOCD: CPT | Mod: CPTII,S$GLB,, | Performed by: STUDENT IN AN ORGANIZED HEALTH CARE EDUCATION/TRAINING PROGRAM

## 2021-01-28 PROCEDURE — 99204 PR OFFICE/OUTPT VISIT, NEW, LEVL IV, 45-59 MIN: ICD-10-PCS | Mod: S$GLB,,, | Performed by: STUDENT IN AN ORGANIZED HEALTH CARE EDUCATION/TRAINING PROGRAM

## 2021-01-28 PROCEDURE — 99204 OFFICE O/P NEW MOD 45 MIN: CPT | Mod: S$GLB,,, | Performed by: STUDENT IN AN ORGANIZED HEALTH CARE EDUCATION/TRAINING PROGRAM

## 2021-01-28 PROCEDURE — 3008F PR BODY MASS INDEX (BMI) DOCUMENTED: ICD-10-PCS | Mod: CPTII,S$GLB,, | Performed by: STUDENT IN AN ORGANIZED HEALTH CARE EDUCATION/TRAINING PROGRAM

## 2021-01-28 RX ORDER — LEVOFLOXACIN 500 MG/1
500 TABLET, FILM COATED ORAL DAILY
Qty: 10 TABLET | Refills: 0 | Status: SHIPPED | OUTPATIENT
Start: 2021-01-28 | End: 2021-02-07

## 2021-02-11 ENCOUNTER — PATIENT OUTREACH (OUTPATIENT)
Dept: ADMINISTRATIVE | Facility: HOSPITAL | Age: 51
End: 2021-02-11

## 2021-03-08 ENCOUNTER — PATIENT MESSAGE (OUTPATIENT)
Dept: FAMILY MEDICINE | Facility: CLINIC | Age: 51
End: 2021-03-08

## 2021-03-08 ENCOUNTER — OFFICE VISIT (OUTPATIENT)
Dept: FAMILY MEDICINE | Facility: CLINIC | Age: 51
End: 2021-03-08
Payer: COMMERCIAL

## 2021-03-08 VITALS
TEMPERATURE: 98 F | OXYGEN SATURATION: 99 % | WEIGHT: 164 LBS | DIASTOLIC BLOOD PRESSURE: 68 MMHG | BODY MASS INDEX: 24.29 KG/M2 | HEIGHT: 69 IN | SYSTOLIC BLOOD PRESSURE: 106 MMHG | HEART RATE: 81 BPM

## 2021-03-08 DIAGNOSIS — R42 DIZZY SPELLS: ICD-10-CM

## 2021-03-08 DIAGNOSIS — Z00.00 ANNUAL PHYSICAL EXAM: Primary | ICD-10-CM

## 2021-03-08 DIAGNOSIS — F17.200 SMOKING: ICD-10-CM

## 2021-03-08 PROCEDURE — 99204 OFFICE O/P NEW MOD 45 MIN: CPT | Mod: S$GLB,,, | Performed by: INTERNAL MEDICINE

## 2021-03-08 PROCEDURE — 99999 PR PBB SHADOW E&M-EST. PATIENT-LVL III: CPT | Mod: PBBFAC,,, | Performed by: INTERNAL MEDICINE

## 2021-03-08 PROCEDURE — 3008F PR BODY MASS INDEX (BMI) DOCUMENTED: ICD-10-PCS | Mod: CPTII,S$GLB,, | Performed by: INTERNAL MEDICINE

## 2021-03-08 PROCEDURE — 93005 ELECTROCARDIOGRAM TRACING: CPT | Mod: S$GLB,,, | Performed by: INTERNAL MEDICINE

## 2021-03-08 PROCEDURE — 1126F AMNT PAIN NOTED NONE PRSNT: CPT | Mod: S$GLB,,, | Performed by: INTERNAL MEDICINE

## 2021-03-08 PROCEDURE — 99999 PR PBB SHADOW E&M-EST. PATIENT-LVL III: ICD-10-PCS | Mod: PBBFAC,,, | Performed by: INTERNAL MEDICINE

## 2021-03-08 PROCEDURE — 93005 EKG 12-LEAD: ICD-10-PCS | Mod: S$GLB,,, | Performed by: INTERNAL MEDICINE

## 2021-03-08 PROCEDURE — 93010 ELECTROCARDIOGRAM REPORT: CPT | Mod: S$GLB,,, | Performed by: INTERNAL MEDICINE

## 2021-03-08 PROCEDURE — 99204 PR OFFICE/OUTPT VISIT, NEW, LEVL IV, 45-59 MIN: ICD-10-PCS | Mod: S$GLB,,, | Performed by: INTERNAL MEDICINE

## 2021-03-08 PROCEDURE — 1126F PR PAIN SEVERITY QUANTIFIED, NO PAIN PRESENT: ICD-10-PCS | Mod: S$GLB,,, | Performed by: INTERNAL MEDICINE

## 2021-03-08 PROCEDURE — 93010 EKG 12-LEAD: ICD-10-PCS | Mod: S$GLB,,, | Performed by: INTERNAL MEDICINE

## 2021-03-08 PROCEDURE — 3008F BODY MASS INDEX DOCD: CPT | Mod: CPTII,S$GLB,, | Performed by: INTERNAL MEDICINE

## 2021-03-12 ENCOUNTER — PATIENT MESSAGE (OUTPATIENT)
Dept: FAMILY MEDICINE | Facility: CLINIC | Age: 51
End: 2021-03-12

## 2021-03-12 DIAGNOSIS — R94.31 ABNORMAL ECG: Primary | ICD-10-CM

## 2021-03-22 ENCOUNTER — LAB VISIT (OUTPATIENT)
Dept: LAB | Facility: HOSPITAL | Age: 51
End: 2021-03-22
Attending: INTERNAL MEDICINE
Payer: COMMERCIAL

## 2021-03-22 DIAGNOSIS — Z00.00 ANNUAL PHYSICAL EXAM: ICD-10-CM

## 2021-03-22 LAB
ALBUMIN SERPL BCP-MCNC: 3.8 G/DL (ref 3.5–5.2)
ALP SERPL-CCNC: 59 U/L (ref 55–135)
ALT SERPL W/O P-5'-P-CCNC: 18 U/L (ref 10–44)
ANION GAP SERPL CALC-SCNC: 8 MMOL/L (ref 8–16)
AST SERPL-CCNC: 22 U/L (ref 10–40)
BASOPHILS # BLD AUTO: 0.04 K/UL (ref 0–0.2)
BASOPHILS NFR BLD: 0.6 % (ref 0–1.9)
BILIRUB SERPL-MCNC: 0.4 MG/DL (ref 0.1–1)
BUN SERPL-MCNC: 11 MG/DL (ref 6–20)
CALCIUM SERPL-MCNC: 9.1 MG/DL (ref 8.7–10.5)
CHLORIDE SERPL-SCNC: 108 MMOL/L (ref 95–110)
CHOLEST SERPL-MCNC: 186 MG/DL (ref 120–199)
CHOLEST/HDLC SERPL: 2.9 {RATIO} (ref 2–5)
CO2 SERPL-SCNC: 24 MMOL/L (ref 23–29)
COMPLEXED PSA SERPL-MCNC: 3.1 NG/ML (ref 0–4)
CREAT SERPL-MCNC: 0.9 MG/DL (ref 0.5–1.4)
DIFFERENTIAL METHOD: ABNORMAL
EOSINOPHIL # BLD AUTO: 0.1 K/UL (ref 0–0.5)
EOSINOPHIL NFR BLD: 1.9 % (ref 0–8)
ERYTHROCYTE [DISTWIDTH] IN BLOOD BY AUTOMATED COUNT: 15.1 % (ref 11.5–14.5)
EST. GFR  (AFRICAN AMERICAN): >60 ML/MIN/1.73 M^2
EST. GFR  (NON AFRICAN AMERICAN): >60 ML/MIN/1.73 M^2
GLUCOSE SERPL-MCNC: 104 MG/DL (ref 70–110)
HCT VFR BLD AUTO: 46.6 % (ref 40–54)
HDLC SERPL-MCNC: 64 MG/DL (ref 40–75)
HDLC SERPL: 34.4 % (ref 20–50)
HGB BLD-MCNC: 15 G/DL (ref 14–18)
IMM GRANULOCYTES # BLD AUTO: 0.04 K/UL (ref 0–0.04)
IMM GRANULOCYTES NFR BLD AUTO: 0.6 % (ref 0–0.5)
LDLC SERPL CALC-MCNC: 90.2 MG/DL (ref 63–159)
LYMPHOCYTES # BLD AUTO: 1.5 K/UL (ref 1–4.8)
LYMPHOCYTES NFR BLD: 24.5 % (ref 18–48)
MCH RBC QN AUTO: 29 PG (ref 27–31)
MCHC RBC AUTO-ENTMCNC: 32.2 G/DL (ref 32–36)
MCV RBC AUTO: 90 FL (ref 82–98)
MONOCYTES # BLD AUTO: 0.3 K/UL (ref 0.3–1)
MONOCYTES NFR BLD: 5 % (ref 4–15)
NEUTROPHILS # BLD AUTO: 4.2 K/UL (ref 1.8–7.7)
NEUTROPHILS NFR BLD: 67.4 % (ref 38–73)
NONHDLC SERPL-MCNC: 122 MG/DL
NRBC BLD-RTO: 0 /100 WBC
PLATELET # BLD AUTO: 191 K/UL (ref 150–350)
PMV BLD AUTO: 9.7 FL (ref 9.2–12.9)
POTASSIUM SERPL-SCNC: 4.4 MMOL/L (ref 3.5–5.1)
PROT SERPL-MCNC: 6.7 G/DL (ref 6–8.4)
RBC # BLD AUTO: 5.17 M/UL (ref 4.6–6.2)
SODIUM SERPL-SCNC: 140 MMOL/L (ref 136–145)
TRIGL SERPL-MCNC: 159 MG/DL (ref 30–150)
TSH SERPL DL<=0.005 MIU/L-ACNC: 2.29 UIU/ML (ref 0.4–4)
WBC # BLD AUTO: 6.24 K/UL (ref 3.9–12.7)

## 2021-03-22 PROCEDURE — 80053 COMPREHEN METABOLIC PANEL: CPT | Performed by: INTERNAL MEDICINE

## 2021-03-22 PROCEDURE — 84153 ASSAY OF PSA TOTAL: CPT | Performed by: INTERNAL MEDICINE

## 2021-03-22 PROCEDURE — 84443 ASSAY THYROID STIM HORMONE: CPT | Performed by: INTERNAL MEDICINE

## 2021-03-22 PROCEDURE — 85025 COMPLETE CBC W/AUTO DIFF WBC: CPT | Performed by: INTERNAL MEDICINE

## 2021-03-22 PROCEDURE — 36415 COLL VENOUS BLD VENIPUNCTURE: CPT | Performed by: INTERNAL MEDICINE

## 2021-03-22 PROCEDURE — 80061 LIPID PANEL: CPT | Performed by: INTERNAL MEDICINE

## 2021-03-23 ENCOUNTER — TELEPHONE (OUTPATIENT)
Dept: FAMILY MEDICINE | Facility: CLINIC | Age: 51
End: 2021-03-23

## 2021-04-01 ENCOUNTER — PATIENT MESSAGE (OUTPATIENT)
Dept: ADMINISTRATIVE | Facility: HOSPITAL | Age: 51
End: 2021-04-01

## 2021-04-13 ENCOUNTER — PATIENT OUTREACH (OUTPATIENT)
Dept: ADMINISTRATIVE | Facility: HOSPITAL | Age: 51
End: 2021-04-13

## 2021-04-16 ENCOUNTER — PATIENT MESSAGE (OUTPATIENT)
Dept: RESEARCH | Facility: HOSPITAL | Age: 51
End: 2021-04-16

## 2021-07-06 ENCOUNTER — PATIENT MESSAGE (OUTPATIENT)
Dept: ADMINISTRATIVE | Facility: HOSPITAL | Age: 51
End: 2021-07-06

## 2021-07-12 ENCOUNTER — PATIENT OUTREACH (OUTPATIENT)
Dept: ADMINISTRATIVE | Facility: HOSPITAL | Age: 51
End: 2021-07-12

## 2021-07-12 ENCOUNTER — OFFICE VISIT (OUTPATIENT)
Dept: URGENT CARE | Facility: CLINIC | Age: 51
End: 2021-07-12
Payer: COMMERCIAL

## 2021-07-12 VITALS
RESPIRATION RATE: 14 BRPM | TEMPERATURE: 98 F | HEIGHT: 69 IN | BODY MASS INDEX: 24.29 KG/M2 | SYSTOLIC BLOOD PRESSURE: 104 MMHG | HEART RATE: 76 BPM | DIASTOLIC BLOOD PRESSURE: 73 MMHG | OXYGEN SATURATION: 99 % | WEIGHT: 164 LBS

## 2021-07-12 DIAGNOSIS — J40 BRONCHITIS: Primary | ICD-10-CM

## 2021-07-12 DIAGNOSIS — R05.9 COUGH: ICD-10-CM

## 2021-07-12 LAB
CTP QC/QA: YES
SARS-COV-2 RDRP RESP QL NAA+PROBE: NEGATIVE

## 2021-07-12 PROCEDURE — 3008F PR BODY MASS INDEX (BMI) DOCUMENTED: ICD-10-PCS | Mod: CPTII,S$GLB,, | Performed by: NURSE PRACTITIONER

## 2021-07-12 PROCEDURE — 99214 PR OFFICE/OUTPT VISIT, EST, LEVL IV, 30-39 MIN: ICD-10-PCS | Mod: S$GLB,,, | Performed by: NURSE PRACTITIONER

## 2021-07-12 PROCEDURE — U0002: ICD-10-PCS | Mod: QW,S$GLB,, | Performed by: NURSE PRACTITIONER

## 2021-07-12 PROCEDURE — 3008F BODY MASS INDEX DOCD: CPT | Mod: CPTII,S$GLB,, | Performed by: NURSE PRACTITIONER

## 2021-07-12 PROCEDURE — 99214 OFFICE O/P EST MOD 30 MIN: CPT | Mod: S$GLB,,, | Performed by: NURSE PRACTITIONER

## 2021-07-12 PROCEDURE — U0002 COVID-19 LAB TEST NON-CDC: HCPCS | Mod: QW,S$GLB,, | Performed by: NURSE PRACTITIONER

## 2021-07-12 RX ORDER — FLUTICASONE PROPIONATE 50 MCG
1 SPRAY, SUSPENSION (ML) NASAL DAILY
Qty: 16 G | Refills: 0 | Status: SHIPPED | OUTPATIENT
Start: 2021-07-12 | End: 2021-09-15 | Stop reason: SDUPTHER

## 2021-07-12 RX ORDER — PREDNISONE 20 MG/1
20 TABLET ORAL DAILY
Qty: 4 TABLET | Refills: 0 | Status: SHIPPED | OUTPATIENT
Start: 2021-07-12 | End: 2021-07-16

## 2021-07-12 RX ORDER — ALBUTEROL SULFATE 90 UG/1
2 AEROSOL, METERED RESPIRATORY (INHALATION) EVERY 4 HOURS PRN
Qty: 18 G | Refills: 0 | Status: SHIPPED | OUTPATIENT
Start: 2021-07-12 | End: 2021-08-12 | Stop reason: SDUPTHER

## 2021-07-20 ENCOUNTER — OFFICE VISIT (OUTPATIENT)
Dept: FAMILY MEDICINE | Facility: CLINIC | Age: 51
End: 2021-07-20
Payer: COMMERCIAL

## 2021-07-20 DIAGNOSIS — J20.8 ACUTE BACTERIAL BRONCHITIS: Primary | ICD-10-CM

## 2021-07-20 DIAGNOSIS — B96.89 ACUTE BACTERIAL BRONCHITIS: Primary | ICD-10-CM

## 2021-07-20 PROCEDURE — 99213 PR OFFICE/OUTPT VISIT, EST, LEVL III, 20-29 MIN: ICD-10-PCS | Mod: 95,,, | Performed by: FAMILY MEDICINE

## 2021-07-20 PROCEDURE — 99213 OFFICE O/P EST LOW 20 MIN: CPT | Mod: 95,,, | Performed by: FAMILY MEDICINE

## 2021-07-20 RX ORDER — DOXYCYCLINE HYCLATE 100 MG
100 TABLET ORAL 2 TIMES DAILY
Qty: 14 TABLET | Refills: 0 | Status: SHIPPED | OUTPATIENT
Start: 2021-07-20 | End: 2021-07-27

## 2021-07-20 RX ORDER — PREDNISONE 20 MG/1
TABLET ORAL
Qty: 18 TABLET | Refills: 0 | Status: SHIPPED | OUTPATIENT
Start: 2021-07-20 | End: 2021-07-29

## 2021-08-12 ENCOUNTER — PATIENT MESSAGE (OUTPATIENT)
Dept: FAMILY MEDICINE | Facility: CLINIC | Age: 51
End: 2021-08-12

## 2021-08-12 DIAGNOSIS — J40 BRONCHITIS: ICD-10-CM

## 2021-08-12 RX ORDER — ALBUTEROL SULFATE 90 UG/1
2 AEROSOL, METERED RESPIRATORY (INHALATION) EVERY 4 HOURS PRN
Qty: 18 G | Refills: 0 | Status: SHIPPED | OUTPATIENT
Start: 2021-08-12 | End: 2023-05-23

## 2021-08-13 ENCOUNTER — PATIENT MESSAGE (OUTPATIENT)
Dept: FAMILY MEDICINE | Facility: CLINIC | Age: 51
End: 2021-08-13

## 2021-09-06 ENCOUNTER — TELEPHONE (OUTPATIENT)
Dept: FAMILY MEDICINE | Facility: CLINIC | Age: 51
End: 2021-09-06

## 2021-09-15 ENCOUNTER — OFFICE VISIT (OUTPATIENT)
Dept: FAMILY MEDICINE | Facility: CLINIC | Age: 51
End: 2021-09-15
Payer: COMMERCIAL

## 2021-09-15 ENCOUNTER — PATIENT MESSAGE (OUTPATIENT)
Dept: FAMILY MEDICINE | Facility: CLINIC | Age: 51
End: 2021-09-15

## 2021-09-15 VITALS
SYSTOLIC BLOOD PRESSURE: 104 MMHG | HEIGHT: 69 IN | BODY MASS INDEX: 23.51 KG/M2 | OXYGEN SATURATION: 99 % | WEIGHT: 158.75 LBS | HEART RATE: 78 BPM | DIASTOLIC BLOOD PRESSURE: 70 MMHG

## 2021-09-15 DIAGNOSIS — F17.210 NICOTINE DEPENDENCE, CIGARETTES, UNCOMPLICATED: ICD-10-CM

## 2021-09-15 DIAGNOSIS — R05.9 COUGH: Primary | ICD-10-CM

## 2021-09-15 DIAGNOSIS — R06.02 SOB (SHORTNESS OF BREATH): ICD-10-CM

## 2021-09-15 DIAGNOSIS — R73.01 ELEVATED FASTING GLUCOSE: ICD-10-CM

## 2021-09-15 DIAGNOSIS — F17.200 SMOKING: ICD-10-CM

## 2021-09-15 PROCEDURE — 1160F RVW MEDS BY RX/DR IN RCRD: CPT | Mod: CPTII,S$GLB,, | Performed by: INTERNAL MEDICINE

## 2021-09-15 PROCEDURE — 3078F DIAST BP <80 MM HG: CPT | Mod: CPTII,S$GLB,, | Performed by: INTERNAL MEDICINE

## 2021-09-15 PROCEDURE — 90715 TDAP VACCINE 7 YRS/> IM: CPT | Mod: S$GLB,,, | Performed by: INTERNAL MEDICINE

## 2021-09-15 PROCEDURE — 99214 PR OFFICE/OUTPT VISIT, EST, LEVL IV, 30-39 MIN: ICD-10-PCS | Mod: 25,S$GLB,, | Performed by: INTERNAL MEDICINE

## 2021-09-15 PROCEDURE — 90471 IMMUNIZATION ADMIN: CPT | Mod: S$GLB,,, | Performed by: INTERNAL MEDICINE

## 2021-09-15 PROCEDURE — 3078F PR MOST RECENT DIASTOLIC BLOOD PRESSURE < 80 MM HG: ICD-10-PCS | Mod: CPTII,S$GLB,, | Performed by: INTERNAL MEDICINE

## 2021-09-15 PROCEDURE — 3008F PR BODY MASS INDEX (BMI) DOCUMENTED: ICD-10-PCS | Mod: CPTII,S$GLB,, | Performed by: INTERNAL MEDICINE

## 2021-09-15 PROCEDURE — 3008F BODY MASS INDEX DOCD: CPT | Mod: CPTII,S$GLB,, | Performed by: INTERNAL MEDICINE

## 2021-09-15 PROCEDURE — 90715 TDAP VACCINE GREATER THAN OR EQUAL TO 7YO IM: ICD-10-PCS | Mod: S$GLB,,, | Performed by: INTERNAL MEDICINE

## 2021-09-15 PROCEDURE — 1159F MED LIST DOCD IN RCRD: CPT | Mod: CPTII,S$GLB,, | Performed by: INTERNAL MEDICINE

## 2021-09-15 PROCEDURE — 3074F SYST BP LT 130 MM HG: CPT | Mod: CPTII,S$GLB,, | Performed by: INTERNAL MEDICINE

## 2021-09-15 PROCEDURE — 99999 PR PBB SHADOW E&M-EST. PATIENT-LVL IV: CPT | Mod: PBBFAC,,, | Performed by: INTERNAL MEDICINE

## 2021-09-15 PROCEDURE — 1160F PR REVIEW ALL MEDS BY PRESCRIBER/CLIN PHARMACIST DOCUMENTED: ICD-10-PCS | Mod: CPTII,S$GLB,, | Performed by: INTERNAL MEDICINE

## 2021-09-15 PROCEDURE — 1159F PR MEDICATION LIST DOCUMENTED IN MEDICAL RECORD: ICD-10-PCS | Mod: CPTII,S$GLB,, | Performed by: INTERNAL MEDICINE

## 2021-09-15 PROCEDURE — 3074F PR MOST RECENT SYSTOLIC BLOOD PRESSURE < 130 MM HG: ICD-10-PCS | Mod: CPTII,S$GLB,, | Performed by: INTERNAL MEDICINE

## 2021-09-15 PROCEDURE — 99214 OFFICE O/P EST MOD 30 MIN: CPT | Mod: 25,S$GLB,, | Performed by: INTERNAL MEDICINE

## 2021-09-15 PROCEDURE — 90471 TDAP VACCINE GREATER THAN OR EQUAL TO 7YO IM: ICD-10-PCS | Mod: S$GLB,,, | Performed by: INTERNAL MEDICINE

## 2021-09-15 PROCEDURE — 99999 PR PBB SHADOW E&M-EST. PATIENT-LVL IV: ICD-10-PCS | Mod: PBBFAC,,, | Performed by: INTERNAL MEDICINE

## 2021-09-15 RX ORDER — LORATADINE 10 MG/1
10 TABLET ORAL DAILY
Qty: 15 TABLET | Refills: 0 | Status: SHIPPED | OUTPATIENT
Start: 2021-09-15 | End: 2023-05-23

## 2021-09-15 RX ORDER — GUAIFENESIN 1200 MG/1
1 TABLET, EXTENDED RELEASE ORAL 2 TIMES DAILY PRN
Qty: 14 TABLET | Refills: 0 | Status: SHIPPED | OUTPATIENT
Start: 2021-09-15 | End: 2021-09-25

## 2021-09-15 RX ORDER — FLUTICASONE PROPIONATE 50 MCG
1 SPRAY, SUSPENSION (ML) NASAL DAILY
Qty: 16 G | Refills: 2 | Status: SHIPPED | OUTPATIENT
Start: 2021-09-15 | End: 2023-12-28

## 2021-09-15 RX ORDER — BENZONATATE 200 MG/1
200 CAPSULE ORAL 3 TIMES DAILY
Qty: 30 CAPSULE | Refills: 1 | Status: SHIPPED | OUTPATIENT
Start: 2021-09-15 | End: 2021-09-25

## 2021-09-17 ENCOUNTER — PATIENT MESSAGE (OUTPATIENT)
Dept: FAMILY MEDICINE | Facility: CLINIC | Age: 51
End: 2021-09-17

## 2021-09-28 ENCOUNTER — TELEPHONE (OUTPATIENT)
Dept: FAMILY MEDICINE | Facility: CLINIC | Age: 51
End: 2021-09-28

## 2021-10-04 ENCOUNTER — PATIENT MESSAGE (OUTPATIENT)
Dept: ADMINISTRATIVE | Facility: HOSPITAL | Age: 51
End: 2021-10-04

## 2021-10-05 ENCOUNTER — HOSPITAL ENCOUNTER (OUTPATIENT)
Dept: RADIOLOGY | Facility: HOSPITAL | Age: 51
Discharge: HOME | End: 2021-10-05
Attending: INTERNAL MEDICINE
Payer: COMMERCIAL

## 2021-10-05 DIAGNOSIS — R06.02 SOB (SHORTNESS OF BREATH): ICD-10-CM

## 2021-10-05 PROCEDURE — 71046 X-RAY EXAM CHEST 2 VIEWS: CPT | Mod: TC,FY

## 2021-10-05 PROCEDURE — 71046 X-RAY EXAM CHEST 2 VIEWS: CPT | Mod: 26,,, | Performed by: RADIOLOGY

## 2021-10-05 PROCEDURE — 71046 XR CHEST PA AND LATERAL: ICD-10-PCS | Mod: 26,,, | Performed by: RADIOLOGY

## 2021-10-06 ENCOUNTER — PATIENT MESSAGE (OUTPATIENT)
Dept: FAMILY MEDICINE | Facility: CLINIC | Age: 51
End: 2021-10-06

## 2021-11-08 ENCOUNTER — PATIENT MESSAGE (OUTPATIENT)
Dept: FAMILY MEDICINE | Facility: CLINIC | Age: 51
End: 2021-11-08
Payer: COMMERCIAL

## 2022-02-18 ENCOUNTER — PATIENT MESSAGE (OUTPATIENT)
Dept: FAMILY MEDICINE | Facility: CLINIC | Age: 52
End: 2022-02-18
Payer: COMMERCIAL

## 2022-03-05 ENCOUNTER — OFFICE VISIT (OUTPATIENT)
Dept: URGENT CARE | Facility: CLINIC | Age: 52
End: 2022-03-05
Payer: COMMERCIAL

## 2022-03-05 VITALS
BODY MASS INDEX: 23.7 KG/M2 | OXYGEN SATURATION: 97 % | SYSTOLIC BLOOD PRESSURE: 109 MMHG | WEIGHT: 160 LBS | DIASTOLIC BLOOD PRESSURE: 76 MMHG | HEART RATE: 75 BPM | RESPIRATION RATE: 18 BRPM | TEMPERATURE: 98 F | HEIGHT: 69 IN

## 2022-03-05 DIAGNOSIS — B96.89 ACUTE BACTERIAL SINUSITIS: Primary | ICD-10-CM

## 2022-03-05 DIAGNOSIS — J01.90 ACUTE BACTERIAL SINUSITIS: Primary | ICD-10-CM

## 2022-03-05 DIAGNOSIS — J40 BRONCHITIS: ICD-10-CM

## 2022-03-05 DIAGNOSIS — R05.9 COUGH: ICD-10-CM

## 2022-03-05 LAB
CTP QC/QA: YES
SARS-COV-2 RDRP RESP QL NAA+PROBE: NEGATIVE

## 2022-03-05 PROCEDURE — U0002 COVID-19 LAB TEST NON-CDC: HCPCS | Mod: QW,S$GLB,, | Performed by: PHYSICIAN ASSISTANT

## 2022-03-05 PROCEDURE — 3008F PR BODY MASS INDEX (BMI) DOCUMENTED: ICD-10-PCS | Mod: CPTII,S$GLB,, | Performed by: PHYSICIAN ASSISTANT

## 2022-03-05 PROCEDURE — 3078F DIAST BP <80 MM HG: CPT | Mod: CPTII,S$GLB,, | Performed by: PHYSICIAN ASSISTANT

## 2022-03-05 PROCEDURE — 3074F SYST BP LT 130 MM HG: CPT | Mod: CPTII,S$GLB,, | Performed by: PHYSICIAN ASSISTANT

## 2022-03-05 PROCEDURE — 71046 X-RAY EXAM CHEST 2 VIEWS: CPT | Mod: FY,S$GLB,, | Performed by: RADIOLOGY

## 2022-03-05 PROCEDURE — 3078F PR MOST RECENT DIASTOLIC BLOOD PRESSURE < 80 MM HG: ICD-10-PCS | Mod: CPTII,S$GLB,, | Performed by: PHYSICIAN ASSISTANT

## 2022-03-05 PROCEDURE — 99213 OFFICE O/P EST LOW 20 MIN: CPT | Mod: S$GLB,CS,, | Performed by: PHYSICIAN ASSISTANT

## 2022-03-05 PROCEDURE — U0002: ICD-10-PCS | Mod: QW,S$GLB,, | Performed by: PHYSICIAN ASSISTANT

## 2022-03-05 PROCEDURE — 3008F BODY MASS INDEX DOCD: CPT | Mod: CPTII,S$GLB,, | Performed by: PHYSICIAN ASSISTANT

## 2022-03-05 PROCEDURE — 1159F MED LIST DOCD IN RCRD: CPT | Mod: CPTII,S$GLB,, | Performed by: PHYSICIAN ASSISTANT

## 2022-03-05 PROCEDURE — 3074F PR MOST RECENT SYSTOLIC BLOOD PRESSURE < 130 MM HG: ICD-10-PCS | Mod: CPTII,S$GLB,, | Performed by: PHYSICIAN ASSISTANT

## 2022-03-05 PROCEDURE — 1160F RVW MEDS BY RX/DR IN RCRD: CPT | Mod: CPTII,S$GLB,, | Performed by: PHYSICIAN ASSISTANT

## 2022-03-05 PROCEDURE — 1160F PR REVIEW ALL MEDS BY PRESCRIBER/CLIN PHARMACIST DOCUMENTED: ICD-10-PCS | Mod: CPTII,S$GLB,, | Performed by: PHYSICIAN ASSISTANT

## 2022-03-05 PROCEDURE — 1159F PR MEDICATION LIST DOCUMENTED IN MEDICAL RECORD: ICD-10-PCS | Mod: CPTII,S$GLB,, | Performed by: PHYSICIAN ASSISTANT

## 2022-03-05 PROCEDURE — 71046 XR CHEST PA AND LATERAL: ICD-10-PCS | Mod: FY,S$GLB,, | Performed by: RADIOLOGY

## 2022-03-05 PROCEDURE — 99213 PR OFFICE/OUTPT VISIT, EST, LEVL III, 20-29 MIN: ICD-10-PCS | Mod: S$GLB,CS,, | Performed by: PHYSICIAN ASSISTANT

## 2022-03-05 RX ORDER — DOXYCYCLINE HYCLATE 100 MG
100 TABLET ORAL 2 TIMES DAILY
Qty: 14 TABLET | Refills: 0 | Status: SHIPPED | OUTPATIENT
Start: 2022-03-05 | End: 2022-03-12

## 2022-03-05 RX ORDER — PREDNISONE 20 MG/1
TABLET ORAL
Qty: 7 TABLET | Refills: 0 | Status: SHIPPED | OUTPATIENT
Start: 2022-03-05 | End: 2022-03-23

## 2022-03-05 RX ORDER — PROMETHAZINE HYDROCHLORIDE AND DEXTROMETHORPHAN HYDROBROMIDE 6.25; 15 MG/5ML; MG/5ML
5 SYRUP ORAL EVERY 8 HOURS PRN
Qty: 118 ML | Refills: 0 | Status: SHIPPED | OUTPATIENT
Start: 2022-03-05 | End: 2022-03-12

## 2022-03-05 RX ORDER — BENZONATATE 200 MG/1
200 CAPSULE ORAL 3 TIMES DAILY PRN
Qty: 30 CAPSULE | Refills: 0 | Status: SHIPPED | OUTPATIENT
Start: 2022-03-05 | End: 2022-03-15

## 2022-03-05 RX ORDER — ALBUTEROL SULFATE 90 UG/1
2 AEROSOL, METERED RESPIRATORY (INHALATION) EVERY 6 HOURS PRN
Qty: 18 G | Refills: 0 | Status: SHIPPED | OUTPATIENT
Start: 2022-03-05 | End: 2022-05-18

## 2022-03-05 NOTE — PROGRESS NOTES
"Subjective:       Patient ID: Arnie Barrientos III is a 52 y.o. male.    Vitals:  height is 5' 9" (1.753 m) and weight is 72.6 kg (160 lb). His temperature is 98.2 °F (36.8 °C). His blood pressure is 109/76 and his pulse is 75. His respiration is 18 and oxygen saturation is 97%.     Chief Complaint: Sinus Problem    Mr. Barrientos presents for evaluation of headache, sinus pain and pressure, congestion, postnasal drip, cough, wheezing.  He reports he tested positive for COVID-19 about 3.5 weeks ago.  He initially had high fever and body aches and chills, but these have all resolved.  He now has a cough that is consistent with sputum production and wheezing night.  He reports feeling short of breath when works too hard during the day (he is a ).  He denies any lung history, but he is a smoker.  He denies any fevers, chills, chest pain, leg swelling, nausea, vomiting, diarrhea, anosmia or ageusia.  He has been taking multiple OTC cough & cold medications with no relief.         Sinus Problem  This is a new problem. The current episode started 1 to 4 weeks ago. The problem has been gradually worsening since onset. There has been no fever. His pain is at a severity of 5/10. Associated symptoms include congestion, coughing, headaches and sinus pressure. Pertinent negatives include no chills, diaphoresis, ear pain, neck pain, shortness of breath or sore throat.   Dental Pain   This is a new problem. The current episode started 1 to 4 weeks ago. The problem has been waxing and waning. The pain is at a severity of 2/10. The pain is mild. Associated symptoms include sinus pressure. Pertinent negatives include no fever.       Constitution: Positive for fatigue. Negative for appetite change, chills, sweating and fever.   HENT: Positive for congestion and sinus pressure. Negative for ear pain, ear discharge, postnasal drip, sinus pain and sore throat.    Neck: Negative for neck pain and neck stiffness. "   Cardiovascular: Negative for chest trauma, chest pain, leg swelling, palpitations, sob on exertion and passing out.   Eyes: Negative for blurred vision.   Respiratory: Positive for cough, sputum production and wheezing. Negative for shortness of breath.    Gastrointestinal: Negative for abdominal pain, nausea, vomiting and diarrhea.   Genitourinary: Negative for dysuria, frequency and urgency.   Musculoskeletal: Negative for pain.   Skin: Negative for rash.   Neurological: Positive for headaches. Negative for dizziness, history of vertigo, light-headedness, passing out, facial drooping, speech difficulty, coordination disturbances and loss of balance.   Hematologic/Lymphatic: Negative for easy bruising/bleeding. Does not bruise/bleed easily.   Psychiatric/Behavioral: Negative for confusion.       Objective:      Physical Exam   Constitutional: He is oriented to person, place, and time. He appears well-developed. He is cooperative.  Non-toxic appearance. He does not appear ill. No distress.   HENT:   Head: Normocephalic and atraumatic.   Ears:   Right Ear: Hearing, tympanic membrane, external ear and ear canal normal.   Left Ear: Hearing, tympanic membrane, external ear and ear canal normal.   Nose: Rhinorrhea present. No mucosal edema or nasal deformity. No epistaxis. Right sinus exhibits no maxillary sinus tenderness and no frontal sinus tenderness. Left sinus exhibits no maxillary sinus tenderness and no frontal sinus tenderness.   Mouth/Throat: Uvula is midline, oropharynx is clear and moist and mucous membranes are normal. No trismus in the jaw. Normal dentition. No uvula swelling. No oropharyngeal exudate, posterior oropharyngeal edema or posterior oropharyngeal erythema. No tonsillar exudate.   Eyes: Conjunctivae and lids are normal. No scleral icterus.   Neck: Trachea normal and phonation normal. Neck supple. No edema present. No erythema present. No neck rigidity present.   Cardiovascular: Normal rate,  regular rhythm, normal heart sounds and normal pulses.   Pulmonary/Chest: Effort normal and breath sounds normal. No stridor. No respiratory distress. He has no decreased breath sounds. He has no wheezes. He has no rhonchi. He has no rales. He exhibits tenderness. He exhibits no crepitus, no edema and no swelling.       Abdominal: Normal appearance.   Musculoskeletal: Normal range of motion.         General: No deformity. Normal range of motion.   Lymphadenopathy:     He has no cervical adenopathy.   Neurological: He is alert and oriented to person, place, and time. He exhibits normal muscle tone. Coordination normal.   Skin: Skin is warm, dry, intact, not diaphoretic and not pale.   Psychiatric: His speech is normal and behavior is normal. Judgment and thought content normal.   Nursing note and vitals reviewed.          Results for orders placed or performed in visit on 03/05/22   POCT COVID-19 Rapid Screening   Result Value Ref Range    POC Rapid COVID Negative Negative     Acceptable Yes      CXR -    No acute process.    Assessment:       1. Acute bacterial sinusitis    2. Cough    3. Bronchitis          Plan:         Acute bacterial sinusitis    Cough  -     POCT COVID-19 Rapid Screening  -     XR CHEST PA AND LATERAL; Future; Expected date: 03/05/2022    Bronchitis    Other orders  -     doxycycline (VIBRA-TABS) 100 MG tablet; Take 1 tablet (100 mg total) by mouth 2 (two) times daily. for 7 days  Dispense: 14 tablet; Refill: 0  -     predniSONE (DELTASONE) 20 MG tablet; Take 1 tab PO qday x 2 days, then 1 tab PO qday x 3 days  Dispense: 7 tablet; Refill: 0  -     albuterol (PROVENTIL HFA) 90 mcg/actuation inhaler; Inhale 2 puffs into the lungs every 6 (six) hours as needed for Wheezing. Rescue  Dispense: 18 g; Refill: 0  -     promethazine-dextromethorphan (PROMETHAZINE-DM) 6.25-15 mg/5 mL Syrp; Take 5 mLs by mouth every 8 (eight) hours as needed (cough).  Dispense: 118 mL; Refill: 0  -      benzonatate (TESSALON) 200 MG capsule; Take 1 capsule (200 mg total) by mouth 3 (three) times daily as needed for Cough (cough).  Dispense: 30 capsule; Refill: 0    Discussed risk of steroids with patient, understanding was verbalized.    Diagnoses and plan discussed with the patient, as well as the expected course and duration of his symptoms.  All questions and concerns were addressed prior to discharge.  He was advised to follow up with his PCP within 1 week if symptoms do not improve.  Emergency department precautions were given.  Patient verbalized understanding and was happy with the plan of care.   Note dictated with voice recognition software, please excuse any grammatical errors.    Patient Instructions   PLEASE READ YOUR DISCHARGE INSTRUCTIONS ENTIRELY AS IT CONTAINS IMPORTANT INFORMATION.  You received a steroid today - this can elevate your blood pressure, elevate your blood sugar, water weight gain, nervous energy, redness to the face and dimpling of the skin where the shot goes in if you had an injection.   Do not use steroids more than 3 times per year.   If you have diabetes, please check you blood sugar frequently.  If you have high blood pressure, please check your blood pressure frequently.     - Rest.    - Drink plenty of fluids.    - Tylenol or Ibuprofen as directed as needed for fever/pain.    - If you were prescribed antibiotics, please take them to completion.  - If you are female and on birth control pills - please use additional methods of contraception to prevent pregnancy while on antibiotics and for one cycle after.   - If you were prescribed a narcotic medication, a cough syrup, or a muscle relaxer, do not drive or operate heavy equipment or machinery while taking these medications, as they can cause drowsiness.   - If you smoke, please stop smoking.  -You must understand that you've received an Urgent Care treatment only and that you may be released before all your medical problems are  known or treated. You, the patient, will    arrange for follow up care as instructed. Please arrange follow up with your primary medical clinic as soon as possible.   - Follow up with your PCP or specialty clinic as directed in the next 1-2 weeks if not improved or as needed.  You can call [phone] to schedule an appointment with the appropriate provider.    - Please return to Urgent Care or to the Emergency Department if your symptoms worsen.    Patient aware and verbalized understanding.

## 2022-03-05 NOTE — PATIENT INSTRUCTIONS
PLEASE READ YOUR DISCHARGE INSTRUCTIONS ENTIRELY AS IT CONTAINS IMPORTANT INFORMATION.  You received a steroid today - this can elevate your blood pressure, elevate your blood sugar, water weight gain, nervous energy, redness to the face and dimpling of the skin where the shot goes in if you had an injection.   Do not use steroids more than 3 times per year.   If you have diabetes, please check you blood sugar frequently.  If you have high blood pressure, please check your blood pressure frequently.     - Rest.    - Drink plenty of fluids.    - Tylenol or Ibuprofen as directed as needed for fever/pain.    - If you were prescribed antibiotics, please take them to completion.  - If you are female and on birth control pills - please use additional methods of contraception to prevent pregnancy while on antibiotics and for one cycle after.   - If you were prescribed a narcotic medication, a cough syrup, or a muscle relaxer, do not drive or operate heavy equipment or machinery while taking these medications, as they can cause drowsiness.   - If you smoke, please stop smoking.  -You must understand that you've received an Urgent Care treatment only and that you may be released before all your medical problems are known or treated. You, the patient, will    arrange for follow up care as instructed. Please arrange follow up with your primary medical clinic as soon as possible.   - Follow up with your PCP or specialty clinic as directed in the next 1-2 weeks if not improved or as needed.  You can call [phone] to schedule an appointment with the appropriate provider.    - Please return to Urgent Care or to the Emergency Department if your symptoms worsen.    Patient aware and verbalized understanding.

## 2022-03-21 ENCOUNTER — PATIENT MESSAGE (OUTPATIENT)
Dept: ADMINISTRATIVE | Facility: HOSPITAL | Age: 52
End: 2022-03-21
Payer: COMMERCIAL

## 2022-03-21 DIAGNOSIS — Z12.11 SCREENING FOR COLON CANCER: ICD-10-CM

## 2022-03-22 ENCOUNTER — OFFICE VISIT (OUTPATIENT)
Dept: FAMILY MEDICINE | Facility: CLINIC | Age: 52
End: 2022-03-22
Payer: COMMERCIAL

## 2022-03-22 VITALS
HEIGHT: 69 IN | SYSTOLIC BLOOD PRESSURE: 118 MMHG | WEIGHT: 159.81 LBS | DIASTOLIC BLOOD PRESSURE: 68 MMHG | OXYGEN SATURATION: 97 % | HEART RATE: 98 BPM | BODY MASS INDEX: 23.67 KG/M2

## 2022-03-22 DIAGNOSIS — F17.200 SMOKING: ICD-10-CM

## 2022-03-22 DIAGNOSIS — J40 BRONCHITIS: ICD-10-CM

## 2022-03-22 DIAGNOSIS — R05.9 COUGH: Primary | ICD-10-CM

## 2022-03-22 DIAGNOSIS — Z83.3 FAMILY HISTORY OF DIABETES MELLITUS: ICD-10-CM

## 2022-03-22 DIAGNOSIS — Z86.16 HISTORY OF COVID-19: ICD-10-CM

## 2022-03-22 DIAGNOSIS — R06.02 SOB (SHORTNESS OF BREATH): ICD-10-CM

## 2022-03-22 PROCEDURE — 3074F SYST BP LT 130 MM HG: CPT | Mod: CPTII,S$GLB,, | Performed by: INTERNAL MEDICINE

## 2022-03-22 PROCEDURE — 99215 OFFICE O/P EST HI 40 MIN: CPT | Mod: S$GLB,,, | Performed by: INTERNAL MEDICINE

## 2022-03-22 PROCEDURE — 1160F RVW MEDS BY RX/DR IN RCRD: CPT | Mod: CPTII,S$GLB,, | Performed by: INTERNAL MEDICINE

## 2022-03-22 PROCEDURE — 1159F PR MEDICATION LIST DOCUMENTED IN MEDICAL RECORD: ICD-10-PCS | Mod: CPTII,S$GLB,, | Performed by: INTERNAL MEDICINE

## 2022-03-22 PROCEDURE — 3074F PR MOST RECENT SYSTOLIC BLOOD PRESSURE < 130 MM HG: ICD-10-PCS | Mod: CPTII,S$GLB,, | Performed by: INTERNAL MEDICINE

## 2022-03-22 PROCEDURE — 1159F MED LIST DOCD IN RCRD: CPT | Mod: CPTII,S$GLB,, | Performed by: INTERNAL MEDICINE

## 2022-03-22 PROCEDURE — 3008F PR BODY MASS INDEX (BMI) DOCUMENTED: ICD-10-PCS | Mod: CPTII,S$GLB,, | Performed by: INTERNAL MEDICINE

## 2022-03-22 PROCEDURE — 1160F PR REVIEW ALL MEDS BY PRESCRIBER/CLIN PHARMACIST DOCUMENTED: ICD-10-PCS | Mod: CPTII,S$GLB,, | Performed by: INTERNAL MEDICINE

## 2022-03-22 PROCEDURE — 99999 PR PBB SHADOW E&M-EST. PATIENT-LVL V: ICD-10-PCS | Mod: PBBFAC,,, | Performed by: INTERNAL MEDICINE

## 2022-03-22 PROCEDURE — 3078F DIAST BP <80 MM HG: CPT | Mod: CPTII,S$GLB,, | Performed by: INTERNAL MEDICINE

## 2022-03-22 PROCEDURE — 99999 PR PBB SHADOW E&M-EST. PATIENT-LVL V: CPT | Mod: PBBFAC,,, | Performed by: INTERNAL MEDICINE

## 2022-03-22 PROCEDURE — 3008F BODY MASS INDEX DOCD: CPT | Mod: CPTII,S$GLB,, | Performed by: INTERNAL MEDICINE

## 2022-03-22 PROCEDURE — 3078F PR MOST RECENT DIASTOLIC BLOOD PRESSURE < 80 MM HG: ICD-10-PCS | Mod: CPTII,S$GLB,, | Performed by: INTERNAL MEDICINE

## 2022-03-22 PROCEDURE — 99215 PR OFFICE/OUTPT VISIT, EST, LEVL V, 40-54 MIN: ICD-10-PCS | Mod: S$GLB,,, | Performed by: INTERNAL MEDICINE

## 2022-03-22 RX ORDER — BUPROPION HYDROCHLORIDE 150 MG/1
150 TABLET ORAL DAILY
Qty: 30 TABLET | Refills: 11 | Status: SHIPPED | OUTPATIENT
Start: 2022-03-22 | End: 2023-04-15

## 2022-03-22 RX ORDER — AZELASTINE 1 MG/ML
2 SPRAY, METERED NASAL 2 TIMES DAILY
Qty: 30 ML | Refills: 2 | Status: SHIPPED | OUTPATIENT
Start: 2022-03-22 | End: 2022-09-15 | Stop reason: SDUPTHER

## 2022-03-22 RX ORDER — IPRATROPIUM BROMIDE AND ALBUTEROL SULFATE 2.5; .5 MG/3ML; MG/3ML
3 SOLUTION RESPIRATORY (INHALATION) EVERY 6 HOURS PRN
Qty: 75 ML | Refills: 0 | Status: SHIPPED | OUTPATIENT
Start: 2022-03-22 | End: 2022-05-17 | Stop reason: SDUPTHER

## 2022-03-22 RX ORDER — BUDESONIDE AND FORMOTEROL FUMARATE DIHYDRATE 80; 4.5 UG/1; UG/1
2 AEROSOL RESPIRATORY (INHALATION) DAILY
Qty: 10 G | Refills: 1 | Status: SHIPPED | OUTPATIENT
Start: 2022-03-22 | End: 2022-05-23 | Stop reason: SDUPTHER

## 2022-03-22 NOTE — PROGRESS NOTES
Subjective:       Patient ID: Arnie Barrientos III is a 52 y.o. male.    Chief Complaint: Cough (2 month )      HPI  Arnie Barrientos III is a 52 y.o. male with history of smoking who presents today for evaluation of a persisting cough.    Reports he was diagnosed with COVID in February.  At that time he presented runny nose , fatty and chills.  Some of his symptoms improved but has persisted with a productive cough of mostly clear sputum, and chest congestion.  He was treated with steroids and nebulizers doxycycline for bronchitis.  His chest x-ray did not show pneumonia.  Upper respiratory tract symptoms improved some but chest congestion persist and lately has been wheezing.  Has been taking Mucinex D with no significant improvement.  Tessalon Perles help some.  Has promethazine syrup that has not use much.  Nebulizer help some but has been using it frequently.  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  Two days ago cough was significant and after he returned from work look tachypneic, was wheezing, and his pulse ox was noted to be 86 %.  His wife  a nebulizer and helped better that the inhaler with improvement of his wheezing and pulse oximetry.  There is no leg swelling or leg pain, new fever, bleeding, or GI symptoms.  He does feels it chest tightness with the congestion.  He is very active at work and worked very physical without significant exertional symptoms when doing okay.    And his previous visit to the office PFT, low-dose chest CT scan and smoking cessation recommended but did not follow through.  Smokes 1 pack per day and has been smoking since he was a teenager.  Drinks a few beers a day, sometimes up to 6. No drugs.    Health Maintenance:  Health Maintenance   Topic Date Due    LDCT Lung Screen  Never done    Lipid Panel  2026    TETANUS VACCINE  09/15/2031    Hepatitis C Screening  Completed       Review of Systems   Constitutional:  Positive for fatigue.   HENT: Positive for sinus pressure/congestion.    Respiratory: Positive for cough, chest tightness, shortness of breath and wheezing.    Cardiovascular: Positive for chest pain (With coughing). Negative for leg swelling.   Gastrointestinal: Negative.    Genitourinary: Negative for difficulty urinating.   Neurological: Positive for headaches.   Psychiatric/Behavioral: Negative for sleep disturbance.      No past medical history on file.    Past Surgical History:   Procedure Laterality Date    ANKLE SURGERY         Family History   Problem Relation Age of Onset    Lung cancer Mother     Arthritis Father     Urolithiasis Father     Cancer Sister     No Known Problems Maternal Grandfather     Cancer Paternal Grandmother     Heart attack Paternal Grandfather 70       Social History     Socioeconomic History    Marital status:    Tobacco Use    Smoking status: Current Every Day Smoker     Packs/day: 1.00     Years: 35.00     Pack years: 35.00     Types: Cigarettes, Vaping w/o nicotine    Smokeless tobacco: Never Used   Substance and Sexual Activity    Alcohol use: Yes     Alcohol/week: 5.0 standard drinks     Types: 5 Cans of beer per week     Comment: Occasionally    Drug use: No       Current Outpatient Medications   Medication Sig Dispense Refill    albuterol (PROVENTIL HFA) 90 mcg/actuation inhaler Inhale 2 puffs into the lungs every 6 (six) hours as needed for Wheezing. Rescue 18 g 0    albuterol (PROVENTIL/VENTOLIN HFA) 90 mcg/actuation inhaler Inhale 2 puffs into the lungs every 4 (four) hours as needed for Shortness of Breath. Rescue 18 g 0    fluticasone propionate (FLONASE) 50 mcg/actuation nasal spray 1 spray (50 mcg total) by Each Nostril route once daily. 16 g 2    loratadine (CLARITIN) 10 mg tablet Take 1 tablet (10 mg total) by mouth once daily. 15 tablet 0    predniSONE (DELTASONE) 20 MG tablet Take 1 tab PO qday x 2 days, then 1 tab PO qday x 3 days 7 tablet  0    albuterol-ipratropium (DUO-NEB) 2.5 mg-0.5 mg/3 mL nebulizer solution Take 3 mLs by nebulization every 6 (six) hours as needed for Wheezing. Rescue 75 mL 0    azelastine (ASTELIN) 137 mcg (0.1 %) nasal spray 2 sprays (274 mcg total) by Nasal route 2 (two) times daily. 30 mL 2    azithromycin (ZITHROMAX Z-TESHA) 250 MG tablet Day 1 - 2 tablets Day 2 thru 5 - 1 tablet 6 tablet 0    benzonatate (TESSALON) 200 MG capsule Take 1 capsule by mouth three times a day 30 capsule 0    budesonide-formoterol 80-4.5 mcg (SYMBICORT) 80-4.5 mcg/actuation HFAA Inhale 2 puffs into the lungs once daily at 6am. Controller 10 g 1    buPROPion (WELLBUTRIN XL) 150 MG TB24 tablet Take 1 tablet (150 mg total) by mouth once daily. 30 tablet 11    predniSONE (DELTASONE) 20 MG tablet Take 1 tablet (20 mg total) by mouth once daily. 10 tablet 0    promethazine-dextromethorphan (PROMETHAZINE-DM) 6.25-15 mg/5 mL Syrp Take 5 mLs by mouth every 4 (four) hours as needed (cough). 240 mL 0     No current facility-administered medications for this visit.       Review of patient's allergies indicates:   Allergen Reactions    Pcn [penicillins] Nausea And Vomiting and Rash         Objective:       Last 3 sets of Vitals    Vitals - 1 value per visit 3/22/2022 3/23/2022 3/23/2022   SYSTOLIC 118 - 118   DIASTOLIC 68 - 72   Pulse 98 - 88   Temp - - -   Resp - - -   SPO2 97 - 95   Weight (lb) 159.83 - 159.83   Weight (kg) 72.5 - 72.5   Height 69 - 69   BMI (Calculated) 23.6 - 23.6   VISIT REPORT - - -   Pain Score  - 10 -   Physical Exam  Constitutional:       General: He is not in acute distress.     Appearance: Normal appearance.   HENT:      Head: Normocephalic.      Right Ear: Tympanic membrane and external ear normal.      Left Ear: Tympanic membrane and external ear normal.      Nose: Nose normal.      Mouth/Throat:      Mouth: Mucous membranes are moist.      Pharynx: No oropharyngeal exudate.   Eyes:      General: No scleral icterus.      Extraocular Movements: Extraocular movements intact.      Conjunctiva/sclera: Conjunctivae normal.      Pupils: Pupils are equal, round, and reactive to light.   Neck:      Vascular: No carotid bruit.      Comments: No goiter.  Cardiovascular:      Rate and Rhythm: Normal rate and regular rhythm.      Pulses: Normal pulses.      Heart sounds: Normal heart sounds.   Pulmonary:      Effort: Pulmonary effort is normal.      Breath sounds: Wheezing (Faint wheezing with mild prolongation of expiratory phase) and rales (In bases) present.      Comments: Mild productive cough  Abdominal:      General: Bowel sounds are normal. There is no distension.      Palpations: Abdomen is soft. There is no mass.      Tenderness: There is no abdominal tenderness.   Musculoskeletal:         General: No swelling. Normal range of motion.      Cervical back: Neck supple.   Lymphadenopathy:      Cervical: No cervical adenopathy.   Skin:     General: Skin is warm and dry.   Neurological:      General: No focal deficit present.      Mental Status: He is alert and oriented to person, place, and time.   Psychiatric:         Mood and Affect: Mood normal.         Behavior: Behavior normal.           CBC:  Recent Labs   Lab 03/22/21  1029   WBC 6.24   RBC 5.17   Hemoglobin 15.0   Hematocrit 46.6   Platelets 191   MCV 90   MCH 29.0   MCHC 32.2     CMP:  Recent Labs   Lab 03/22/21  1029   Glucose 104   Calcium 9.1   Albumin 3.8   Total Protein 6.7   Sodium 140   Potassium 4.4   CO2 24   Chloride 108   BUN 11   Creatinine 0.9   Alkaline Phosphatase 59   ALT 18   AST 22   Total Bilirubin 0.4     URINALYSIS:       LIPIDS:  Recent Labs   Lab 03/22/21  1029   TSH 2.292   HDL 64   Cholesterol 186   Triglycerides 159 H   LDL Cholesterol 90.2   HDL/Cholesterol Ratio 34.4   Non-HDL Cholesterol 122   Total Cholesterol/HDL Ratio 2.9     TSH:  Recent Labs   Lab 03/22/21  1029   TSH 2.292       A1C:        Imaging:  X-Ray Chest PA And Lateral  Narrative:  EXAMINATION:  XR CHEST PA AND LATERAL    CLINICAL HISTORY:  Cough, unspecified    FINDINGS:  Chest two views: Heart size is normal.  Lungs are clear and the bones show nothing unusual.  Impression: No acute process seen.    Electronically signed by: Devante Morrison MD  Date:    03/23/2022  Time:    12:12      Assessment:       1. Cough    2. Bronchitis    3. SOB (shortness of breath)    4. Smoking    5. History of COVID-19          Chronic conditions status updated as per HPI. Other than changes above, cont current medications and maintain follow up with specialist. Return to clinic in 2 wks.  Plan:       Arnie was seen today for cough.    Diagnoses and all orders for this visit:    Cough  -     azelastine (ASTELIN) 137 mcg (0.1 %) nasal spray; 2 sprays (274 mcg total) by Nasal route 2 (two) times daily.  -     smoking cessation  -     Ambulatory referral/consult to Pulmonology; Future  -     NEBULIZER FOR HOME USE  - continue Tessalon Perles, Mucinex, and promethazine as needed    Bronchitis  -     acute on chronic bronchitis.  Recent antibiotic therapy and no fever.  Reports clear sputum.   - albuterol-ipratropium (DUO-NEB) 2.5 mg-0.5 mg/3 mL nebulizer solution; Take 3 mLs by nebulization every 6 (six) hours as needed for Wheezing. Rescue and keep albuterol inhaler to use when nebulize is not available.  -    will start low dose long-acting with mild steroid due to persistent symptoms and chronicity.  -   budesonide-formoterol 80-4.5 mcg (SYMBICORT) 80-4.5 mcg/actuation HFAA; Inhale 2 puffs into the lungs once daily at 6am. Controller  -     CBC Auto Differential; Future- consider antibiotics if WBC is elevated    SOB (shortness of breath)  -     albuterol-ipratropium (DUO-NEB) 2.5 mg-0.5 mg/3 mL nebulizer solution; Take 3 mLs by nebulization every 6 (six) hours as needed for Wheezing. Rescue  -     Ambulatory referral/consult to Pulmonology; Future  -     CT Chest Lung Screening Low Dose; Future  -      Comprehensive Metabolic Panel; Future  -     TSH; Future  - smoking cessation    Smoking  -     buPROPion (WELLBUTRIN XL) 150 MG TB24 tablet; Take 1 tablet (150 mg total) by mouth once daily.  Re-evaluate in 2 weeks.  -     CT Chest Lung Screening Low Dose; Future  -     Comprehensive Metabolic Panel; Future  -     Lipid Panel; Future  -     Ambulatory referral/consult to Smoking Cessation Program; Future    History of COVID-19  -     Noted.  Ambulatory referral/consult to Pulmonology; Future  - booster recommended when able.    Family history of diabetes mellitus  -     Hemoglobin A1C; Future      Health Maintenance Due   Topic Date Due    Pneumococcal Vaccines (Age 0-64) (1 of 2 - PPSV23) Never done    Colorectal Cancer Screening  Never done    LDCT Lung Screen  Never done    Shingles Vaccine (1 of 2) Never done    COVID-19 Vaccine (3 - Booster for Pfizer series) 10/02/2021        Samantha Moralez MD  [facility]  Disclaimer:  This note has been generated using voice-recognition software. There may be grammatical or spelling errors that have been missed during proof-reading

## 2022-03-23 ENCOUNTER — HOSPITAL ENCOUNTER (OUTPATIENT)
Dept: RADIOLOGY | Facility: HOSPITAL | Age: 52
Discharge: HOME | End: 2022-03-23
Attending: INTERNAL MEDICINE
Payer: COMMERCIAL

## 2022-03-23 ENCOUNTER — OFFICE VISIT (OUTPATIENT)
Dept: PULMONOLOGY | Facility: CLINIC | Age: 52
End: 2022-03-23
Payer: COMMERCIAL

## 2022-03-23 VITALS
BODY MASS INDEX: 23.67 KG/M2 | DIASTOLIC BLOOD PRESSURE: 72 MMHG | OXYGEN SATURATION: 95 % | WEIGHT: 159.81 LBS | HEIGHT: 69 IN | HEART RATE: 88 BPM | SYSTOLIC BLOOD PRESSURE: 118 MMHG

## 2022-03-23 DIAGNOSIS — R05.9 COUGH: ICD-10-CM

## 2022-03-23 DIAGNOSIS — J40 BRONCHITIS: ICD-10-CM

## 2022-03-23 DIAGNOSIS — F17.200 CURRENT SMOKER: Primary | ICD-10-CM

## 2022-03-23 PROCEDURE — 71046 X-RAY EXAM CHEST 2 VIEWS: CPT | Mod: TC,FY

## 2022-03-23 PROCEDURE — 71046 X-RAY EXAM CHEST 2 VIEWS: CPT | Mod: 26,,, | Performed by: RADIOLOGY

## 2022-03-23 PROCEDURE — 3078F PR MOST RECENT DIASTOLIC BLOOD PRESSURE < 80 MM HG: ICD-10-PCS | Mod: CPTII,S$GLB,, | Performed by: INTERNAL MEDICINE

## 2022-03-23 PROCEDURE — 3074F PR MOST RECENT SYSTOLIC BLOOD PRESSURE < 130 MM HG: ICD-10-PCS | Mod: CPTII,S$GLB,, | Performed by: INTERNAL MEDICINE

## 2022-03-23 PROCEDURE — 71046 XR CHEST PA AND LATERAL: ICD-10-PCS | Mod: 26,,, | Performed by: RADIOLOGY

## 2022-03-23 PROCEDURE — 1159F PR MEDICATION LIST DOCUMENTED IN MEDICAL RECORD: ICD-10-PCS | Mod: CPTII,S$GLB,, | Performed by: INTERNAL MEDICINE

## 2022-03-23 PROCEDURE — 99999 PR PBB SHADOW E&M-EST. PATIENT-LVL IV: CPT | Mod: PBBFAC,,, | Performed by: INTERNAL MEDICINE

## 2022-03-23 PROCEDURE — 1160F RVW MEDS BY RX/DR IN RCRD: CPT | Mod: CPTII,S$GLB,, | Performed by: INTERNAL MEDICINE

## 2022-03-23 PROCEDURE — 3008F BODY MASS INDEX DOCD: CPT | Mod: CPTII,S$GLB,, | Performed by: INTERNAL MEDICINE

## 2022-03-23 PROCEDURE — 1159F MED LIST DOCD IN RCRD: CPT | Mod: CPTII,S$GLB,, | Performed by: INTERNAL MEDICINE

## 2022-03-23 PROCEDURE — 3074F SYST BP LT 130 MM HG: CPT | Mod: CPTII,S$GLB,, | Performed by: INTERNAL MEDICINE

## 2022-03-23 PROCEDURE — 3078F DIAST BP <80 MM HG: CPT | Mod: CPTII,S$GLB,, | Performed by: INTERNAL MEDICINE

## 2022-03-23 PROCEDURE — 99204 OFFICE O/P NEW MOD 45 MIN: CPT | Mod: S$GLB,,, | Performed by: INTERNAL MEDICINE

## 2022-03-23 PROCEDURE — 1160F PR REVIEW ALL MEDS BY PRESCRIBER/CLIN PHARMACIST DOCUMENTED: ICD-10-PCS | Mod: CPTII,S$GLB,, | Performed by: INTERNAL MEDICINE

## 2022-03-23 PROCEDURE — 99999 PR PBB SHADOW E&M-EST. PATIENT-LVL IV: ICD-10-PCS | Mod: PBBFAC,,, | Performed by: INTERNAL MEDICINE

## 2022-03-23 PROCEDURE — 99204 PR OFFICE/OUTPT VISIT, NEW, LEVL IV, 45-59 MIN: ICD-10-PCS | Mod: S$GLB,,, | Performed by: INTERNAL MEDICINE

## 2022-03-23 PROCEDURE — 3008F PR BODY MASS INDEX (BMI) DOCUMENTED: ICD-10-PCS | Mod: CPTII,S$GLB,, | Performed by: INTERNAL MEDICINE

## 2022-03-23 RX ORDER — AZITHROMYCIN 250 MG/1
TABLET, FILM COATED ORAL
Qty: 6 TABLET | Refills: 0 | Status: SHIPPED | OUTPATIENT
Start: 2022-03-23 | End: 2022-06-04

## 2022-03-23 RX ORDER — PREDNISONE 20 MG/1
20 TABLET ORAL DAILY
Qty: 10 TABLET | Refills: 0 | Status: SHIPPED | OUTPATIENT
Start: 2022-03-23 | End: 2022-05-30

## 2022-03-23 RX ORDER — PROMETHAZINE HYDROCHLORIDE AND DEXTROMETHORPHAN HYDROBROMIDE 6.25; 15 MG/5ML; MG/5ML
5 SYRUP ORAL EVERY 4 HOURS PRN
Qty: 240 ML | Refills: 0 | Status: SHIPPED | OUTPATIENT
Start: 2022-03-23 | End: 2022-04-02

## 2022-03-23 NOTE — PATIENT INSTRUCTIONS
Duoneb Nebulizer every 6 hour as needed  Abluterol inhaler as needed  Symbiocort 2 puffs twice a day, rinse mouth after.  Prdnisone 20 mg daily for 7 days  Azithromycin for 5 days  Promethazine as needed  Can try Lidocaine patch left rib  Pulmonary Function Test one month later  CT chest ordered by your PCP

## 2022-03-23 NOTE — PROGRESS NOTES
"Subjective:       Patient ID: Arnie Barrientos III is a 52 y.o. male.    Chief Complaint: Shortness of Breath, COVID-19 Concerns, Wheezing, and Chest Pain (Left rib pain)    HPI   51 yo male heavy smoker with recent COVID diagnosis in February 2022, presents to pulmonary clinic for evaluation of persistent cough and shortness of breath. He is accompanied by his wife, Shanta Gatica and his baby, Dave. Wife corroborated the story.   He was diagnosed with COVID in Feb 2021, when he experienced fatigue and chills, which progressed to shortness of breath. He was evaluated in urgent care on March 5, 2022 and treated with 3 days of steroids and course of outpatient Doxycycline. A Chest CXR performed same day without acute finding. Symptoms initially improved, but persistented and has become worse again. He went to Urgent care on 3/22/22 and was prescribed Duoneb solution for nebulizer and Symbicort.    He current complains of fatigue, chills, chest congestion and cough. He has dyspnea with exertion and has been working throughout out this period as a . He has felt a sharp pain in the left lower rib worse with cough. Has left rib pain and felt that something "popped".  Tripod position is most comfortable for him. She checks his O2 with pulse oximeter and has been reading in high 80's.    His wife has mentioned that even prior to COVID-19 infection in Feb 2022, he has had shortness of breath with exertion and wheezing. He has a chronic dry cough. He has wheezing regularly and worse at night.   He has smoked for 30 years 1 ppd. Smokes Crown lights. Tried Electronic Cigarette recently about 2 months ago.      His PCP ordered nebulizer with duo neb. Ibuprofen 600 mg as needed. Promethazine was ordered for cough suppressant.     Current meds include Mucinex D and Tesslon perls for cough. Albuterol inhaler as often as 6 times a day. He has started nebulizer therapy with Duoneb.      Social Hx:  Active smoker of 30+ " pack year, > 1 PPD. Tried Electronic Cigarette in the past, no longer using this. He was prescribed Wellbutrin recently by his PCP.    Family Hx:  Strong family history of lung ca.    Review of Systems   Constitutional: Negative for fever, chills, activity change, fatigue, night sweats and weakness.   HENT: Positive for sinus pressure. Negative for postnasal drip, sore throat, trouble swallowing and congestion.    Respiratory: Positive for cough, chest tightness, shortness of breath, wheezing and dyspnea on extertion. Negative for orthopnea and Paroxysmal Nocturnal Dyspnea.    Cardiovascular: Negative for chest pain, palpitations and leg swelling.   Musculoskeletal: Negative for arthralgias.        Left rib pain   Skin: Negative for rash.   Gastrointestinal: Negative for nausea, vomiting and acid reflux.   Neurological: Negative for light-headedness and headaches.       Objective:      Physical Exam   Constitutional: He is oriented to person, place, and time. He appears well-developed. He is not obese.   HENT:   Head: Normocephalic.   Nose: No mucosal edema.   Mouth/Throat: Oropharynx is clear and moist.   Cardiovascular: Normal rate, regular rhythm and normal heart sounds.   No murmur heard.  Pulmonary/Chest: Normal expansion. No stridor. No respiratory distress. He has no rales.   Expiratory wheezes b/l worse in mid lungs   Abdominal: He exhibits no distension.   Musculoskeletal:         General: No edema.      Cervical back: Normal range of motion and neck supple.   Neurological: He is alert and oriented to person, place, and time.   Skin: Skin is warm and dry.   Psychiatric: He has a normal mood and affect. His behavior is normal. Thought content normal.       Assessment:       1. Current smoker    2. Cough    3. Bronchitis        Outpatient Encounter Medications as of 3/23/2022   Medication Sig Dispense Refill    albuterol (PROVENTIL HFA) 90 mcg/actuation inhaler Inhale 2 puffs into the lungs every 6 (six)  hours as needed for Wheezing. Rescue 18 g 0    albuterol (PROVENTIL/VENTOLIN HFA) 90 mcg/actuation inhaler Inhale 2 puffs into the lungs every 4 (four) hours as needed for Shortness of Breath. Rescue 18 g 0    albuterol-ipratropium (DUO-NEB) 2.5 mg-0.5 mg/3 mL nebulizer solution Take 3 mLs by nebulization every 6 (six) hours as needed for Wheezing. Rescue 75 mL 0    azelastine (ASTELIN) 137 mcg (0.1 %) nasal spray 2 sprays (274 mcg total) by Nasal route 2 (two) times daily. 30 mL 2    azithromycin (ZITHROMAX Z-TESHA) 250 MG tablet Day 1 - 2 tablets Day 2 thru 5 - 1 tablet 6 tablet 0    budesonide-formoterol 80-4.5 mcg (SYMBICORT) 80-4.5 mcg/actuation HFAA Inhale 2 puffs into the lungs once daily at 6am. Controller 10 g 1    buPROPion (WELLBUTRIN XL) 150 MG TB24 tablet Take 1 tablet (150 mg total) by mouth once daily. 30 tablet 11    fluticasone propionate (FLONASE) 50 mcg/actuation nasal spray 1 spray (50 mcg total) by Each Nostril route once daily. 16 g 2    loratadine (CLARITIN) 10 mg tablet Take 1 tablet (10 mg total) by mouth once daily. 15 tablet 0    predniSONE (DELTASONE) 20 MG tablet Take 1 tab PO qday x 2 days, then 1 tab PO qday x 3 days 7 tablet 0    predniSONE (DELTASONE) 20 MG tablet Take 1 tablet (20 mg total) by mouth once daily. 10 tablet 0    promethazine-dextromethorphan (PROMETHAZINE-DM) 6.25-15 mg/5 mL Syrp Take 5 mLs by mouth every 4 (four) hours as needed (cough). 240 mL 0     No facility-administered encounter medications on file as of 3/23/2022.     Orders Placed This Encounter   Procedures    X-Ray Chest PA And Lateral     Standing Status:   Future     Number of Occurrences:   1     Standing Expiration Date:   3/23/2023     Order Specific Question:   May the Radiologist modify the order per protocol to meet the clinical needs of the patient?     Answer:   Yes     Order Specific Question:   Release to patient     Answer:   Immediate    Complete PFT with bronchodilator     Standing  Status:   Future     Standing Expiration Date:   3/23/2023     Order Specific Question:   Release to patient     Answer:   Immediate       Plan:       Bronchitis  Recent COVID  CXR was performed today, no acute process, no pneumothorax  - Continue Duoneb neb every 6 hours as needed  - Albuterol inhaler PRN when he is not home  - Symbicort is appropriate for now - 2 puffs twice a day  - Prednisone 20 mg daily for 7 days  - Azithromycin for 5 days  - Promethazine-DM as needed  - Lidocaine patch as as needed  - told patient to take a week to rest     Active Smoker  Chronic cough  - check PFT   - CT chest ordered by PCP - pending  - discussed utmost importance of smoking cessation, has Wellbutrin    Chronic sinusitis, could be allergic  - use Flonase 1 spray each nostril  - Azelastine ordered by PCP, encouraged to use as well    Follow up in 4 weeks

## 2022-03-30 ENCOUNTER — PATIENT MESSAGE (OUTPATIENT)
Dept: PULMONOLOGY | Facility: CLINIC | Age: 52
End: 2022-03-30
Payer: COMMERCIAL

## 2022-03-30 ENCOUNTER — PATIENT OUTREACH (OUTPATIENT)
Dept: ADMINISTRATIVE | Facility: HOSPITAL | Age: 52
End: 2022-03-30
Payer: COMMERCIAL

## 2022-03-30 ENCOUNTER — HOSPITAL ENCOUNTER (OUTPATIENT)
Dept: RADIOLOGY | Facility: HOSPITAL | Age: 52
Discharge: HOME | End: 2022-03-30
Attending: INTERNAL MEDICINE
Payer: COMMERCIAL

## 2022-03-30 DIAGNOSIS — F17.200 SMOKING: ICD-10-CM

## 2022-03-30 DIAGNOSIS — R06.02 SOB (SHORTNESS OF BREATH): ICD-10-CM

## 2022-03-30 PROCEDURE — 71271 CT CHEST LUNG SCREENING LOW DOSE: ICD-10-PCS | Mod: 26,,, | Performed by: RADIOLOGY

## 2022-03-30 PROCEDURE — 71271 CT THORAX LUNG CANCER SCR C-: CPT | Mod: TC

## 2022-03-30 PROCEDURE — 71271 CT THORAX LUNG CANCER SCR C-: CPT | Mod: 26,,, | Performed by: RADIOLOGY

## 2022-03-31 ENCOUNTER — PATIENT MESSAGE (OUTPATIENT)
Dept: FAMILY MEDICINE | Facility: CLINIC | Age: 52
End: 2022-03-31
Payer: COMMERCIAL

## 2022-05-03 ENCOUNTER — CLINICAL SUPPORT (OUTPATIENT)
Dept: SMOKING CESSATION | Facility: CLINIC | Age: 52
End: 2022-05-03
Payer: COMMERCIAL

## 2022-05-03 DIAGNOSIS — F17.210 CIGARETTE SMOKER: Primary | ICD-10-CM

## 2022-05-03 PROCEDURE — 99404 PREV MED CNSL INDIV APPRX 60: CPT | Mod: S$GLB,,,

## 2022-05-03 PROCEDURE — 99404 PR PREVENT COUNSEL,INDIV,60 MIN: ICD-10-PCS | Mod: S$GLB,,,

## 2022-05-03 PROCEDURE — 99999 PR PBB SHADOW E&M-EST. PATIENT-LVL II: ICD-10-PCS | Mod: PBBFAC,,,

## 2022-05-03 PROCEDURE — 99999 PR PBB SHADOW E&M-EST. PATIENT-LVL II: CPT | Mod: PBBFAC,,,

## 2022-05-03 RX ORDER — DIPHENHYDRAMINE HCL 25 MG
4 CAPSULE ORAL
Qty: 100 EACH | Refills: 0 | Status: SHIPPED | OUTPATIENT
Start: 2022-05-03

## 2022-05-03 RX ORDER — IBUPROFEN 200 MG
1 TABLET ORAL DAILY
Qty: 14 PATCH | Refills: 0 | Status: SHIPPED | OUTPATIENT
Start: 2022-05-03 | End: 2023-05-23

## 2022-05-03 NOTE — PROGRESS NOTES
5/3/2022     CESD of 13  is perceived as no mental distress or depression at this time. FTND of  6 Indicates a moderate  level  of tobacco/nicotine dependency. Patient states he cut down his cigaerettes from 2 ppd to 4 cpd. Pt has some withdrawal symptoms. Ways to combat nicotine craving were discussed with patient. The patient will begin the prescribed tobacco cessation medication regimen of 21 mg patches with 4 mg gum.

## 2022-05-17 ENCOUNTER — CLINICAL SUPPORT (OUTPATIENT)
Dept: SMOKING CESSATION | Facility: CLINIC | Age: 52
End: 2022-05-17
Payer: COMMERCIAL

## 2022-05-17 DIAGNOSIS — R06.02 SOB (SHORTNESS OF BREATH): ICD-10-CM

## 2022-05-17 DIAGNOSIS — J40 BRONCHITIS: ICD-10-CM

## 2022-05-17 DIAGNOSIS — F17.210 CIGARETTE SMOKER: Primary | ICD-10-CM

## 2022-05-17 PROCEDURE — 99999 PR PBB SHADOW E&M-EST. PATIENT-LVL II: ICD-10-PCS | Mod: PBBFAC,,,

## 2022-05-17 PROCEDURE — 99999 PR PBB SHADOW E&M-EST. PATIENT-LVL II: CPT | Mod: PBBFAC,,,

## 2022-05-17 PROCEDURE — 99403 PREV MED CNSL INDIV APPRX 45: CPT | Mod: S$GLB,,,

## 2022-05-17 PROCEDURE — 99403 PR PREVENT COUNSEL,INDIV,45 MIN: ICD-10-PCS | Mod: S$GLB,,,

## 2022-05-17 NOTE — PROGRESS NOTES
Individual Follow-Up Form    5/17/2022    Quit Date: TBD    Clinical Status of Patient: Outpatient    Length of Service: 45 minutes    Continuing Medication: yes  Patches or Nicotine gum    Other Medications: Wellbutrin     Target Symptoms: Withdrawal and medication side effects. The following were  rated moderate (3) to severe (4) on TCRS:  · Moderate (3): Desire/crave tobacco  · Severe (4): none    Comments: Patient was seen today in the clinic for a smoking cessation follow up. Exhaled carbon monoxide level was 12 ppm per Smokerlyzer. Patient states he increased from 4 cpd to 8 cpd. Patient is not wearing his patch daily. Reinforced usage of nicotine patch daily. Triggers and rate reduction strategies reviewed.  The patient remains on the prescribed tobacco cessation medication regimen of 21 mg patches and 4 mg gum without any negative side effects at this time. The patient will continue with  therapy sessions and medication monitoring by CTTS. Prescribed medication management will be by physician.completion of TCRS (Tobacco Cessation Rating Scale) reviewed strategies, controlling environment, cues, triggers, new goals set. Introduced high risk situations with preparation interventions, caffeine similarities with withdrawal issues of habit and nicotine, Alcohol, Understanding urges, cravings, stress and relaxation.      Diagnosis: F17.210    Next Visit: 2 weeks

## 2022-05-17 NOTE — TELEPHONE ENCOUNTER
No new care gaps identified.  [facility] Embedded Care Gaps. Reference number: [id number]. 5/17/2022   9:38:40 AM CDT

## 2022-05-18 RX ORDER — IPRATROPIUM BROMIDE AND ALBUTEROL SULFATE 2.5; .5 MG/3ML; MG/3ML
3 SOLUTION RESPIRATORY (INHALATION) EVERY 6 HOURS PRN
Qty: 75 ML | Refills: 0 | Status: SHIPPED | OUTPATIENT
Start: 2022-05-18 | End: 2022-06-24

## 2022-05-18 RX ORDER — ALBUTEROL SULFATE 90 UG/1
2 AEROSOL, METERED RESPIRATORY (INHALATION) EVERY 6 HOURS PRN
Qty: 18 G | Refills: 6 | Status: SHIPPED | OUTPATIENT
Start: 2022-05-18 | End: 2022-12-19 | Stop reason: SDUPTHER

## 2022-05-18 NOTE — TELEPHONE ENCOUNTER
Refill Routing Note   Medication(s) are not appropriate for processing by [facility] for the following reason(s):      - Medication requested has undergone a recent dosage adjustment (<3 months)    [facility] action(s):  Defer       Medication Therapy Plan: Recent dose adjustment (3/5/22); Defer  Medication reconciliation completed: No     Appointments  past 12m or future 3m with PCP    Date Provider   Last Visit   3/22/2022 Samantha Moralez MD   Next Visit   5/17/2022 Samantha Moralez MD   ED visits in past 90 days: 0        Note composed:2:53 PM 05/18/2022

## 2022-05-18 NOTE — TELEPHONE ENCOUNTER
No new care gaps identified.  [facility] Embedded Care Gaps. Reference number: [id number]. 5/18/2022   2:52:20 PM CDT

## 2022-05-23 ENCOUNTER — OFFICE VISIT (OUTPATIENT)
Dept: PULMONOLOGY | Facility: CLINIC | Age: 52
End: 2022-05-23
Payer: COMMERCIAL

## 2022-05-23 ENCOUNTER — HOSPITAL ENCOUNTER (OUTPATIENT)
Dept: PULMONOLOGY | Facility: CLINIC | Age: 52
Discharge: HOME | End: 2022-05-23
Payer: COMMERCIAL

## 2022-05-23 VITALS
OXYGEN SATURATION: 94 % | BODY MASS INDEX: 24.65 KG/M2 | WEIGHT: 166.44 LBS | SYSTOLIC BLOOD PRESSURE: 120 MMHG | DIASTOLIC BLOOD PRESSURE: 72 MMHG | HEIGHT: 69 IN | HEART RATE: 77 BPM

## 2022-05-23 DIAGNOSIS — J40 BRONCHITIS: ICD-10-CM

## 2022-05-23 DIAGNOSIS — R05.9 COUGH: ICD-10-CM

## 2022-05-23 DIAGNOSIS — F17.200 CURRENT SMOKER: ICD-10-CM

## 2022-05-23 DIAGNOSIS — J44.9 CHRONIC OBSTRUCTIVE PULMONARY DISEASE, UNSPECIFIED COPD TYPE: Primary | ICD-10-CM

## 2022-05-23 LAB
DLCO SINGLE BREATH LLN: 23.16
DLCO SINGLE BREATH PRE REF: 78.3 %
DLCO SINGLE BREATH REF: 30.09
DLCOC SBVA LLN: 3.11
DLCOC SBVA REF: 4.35
DLCOC SINGLE BREATH LLN: 23.16
DLCOC SINGLE BREATH REF: 30.09
DLCOCSBVAULN: 5.58
DLCOCSINGLEBREATHULN: 37.01
DLCOSINGLEBREATHULN: 37.01
DLCOVA LLN: 3.11
DLCOVA PRE REF: 104.3 %
DLCOVA PRE: 4.53 ML/(MIN*MMHG*L) (ref 3.11–5.58)
DLCOVA REF: 4.35
DLCOVAULN: 5.58
ERV LLN: -16448.72
ERV PRE REF: 114.5 %
ERV REF: 1.28
ERVULN: ABNORMAL
FEF 25 75 LLN: 1.84
FEF 25 75 PRE REF: 39.7 %
FEF 25 75 REF: 3.43
FET100 CHG: -3.3 %
FEV05 LLN: 1.74
FEV05 REF: 2.87
FEV1 CHG: 8.1 %
FEV1 FVC LLN: 68
FEV1 FVC PRE REF: 84.1 %
FEV1 FVC REF: 79
FEV1 LLN: 2.93
FEV1 PRE REF: 63.7 %
FEV1 REF: 3.77
FEV1 VOL CHG: 0.19
FRCPLETH LLN: 2.48
FRCPLETH PREREF: 130.2 %
FRCPLETH REF: 3.47
FRCPLETHULN: 4.46
FVC CHG: 6.7 %
FVC LLN: 3.73
FVC PRE REF: 75.5 %
FVC REF: 4.78
FVC VOL CHG: 0.24
IVC PRE: 3.44 L (ref 3.73–5.85)
IVC SINGLE BREATH LLN: 3.73
IVC SINGLE BREATH PRE REF: 71.9 %
IVC SINGLE BREATH REF: 4.78
IVCSINGLEBREATHULN: 5.85
LLN IC: -9999996.67
PEF LLN: 7.3
PEF PRE REF: 66.3 %
PEF REF: 9.55
PHYSICIAN COMMENT: ABNORMAL
POST FEF 25 75: 1.56 L/S (ref 1.84–5.51)
POST FET 100: 6.04 SEC
POST FEV1 FVC: 67.35 % (ref 68.02–88.57)
POST FEV1: 2.6 L (ref 2.93–4.57)
POST FEV5: 1.95 L (ref 1.74–4.01)
POST FVC: 3.85 L (ref 3.73–5.85)
POST PEF: 7.1 L/S (ref 7.3–11.81)
PRE DLCO: 23.55 ML/(MIN*MMHG) (ref 23.16–37.01)
PRE ERV: 1.47 L (ref -16448.72–16451.28)
PRE FEF 25 75: 1.36 L/S (ref 1.84–5.51)
PRE FET 100: 6.25 SEC
PRE FEV05 REF: 61.3 %
PRE FEV1 FVC: 66.49 % (ref 68.02–88.57)
PRE FEV1: 2.4 L (ref 2.93–4.57)
PRE FEV5: 1.76 L (ref 1.74–4.01)
PRE FRC PL: 4.52 L (ref 2.48–4.46)
PRE FVC: 3.61 L (ref 3.73–5.85)
PRE IC: 2.14 L (ref -9999996.67–#######.####)
PRE PEF: 6.33 L/S (ref 7.3–11.81)
PRE REF IC: 64.3 %
PRE RV: 3.05 L (ref 1.51–2.86)
PRE TLC: 6.66 L (ref 5.77–8.07)
PRE VTG: 4.63 L
RAW PRE REF: 102 %
RAW PRE: 3.12 CMH2O*S/L (ref 3.06–3.06)
RAW REF: 3.06
REF IC: 3.33
RV LLN: 1.51
RV PRE REF: 139.3 %
RV REF: 2.19
RVTLC LLN: 25
RVTLC PRE REF: 135.2 %
RVTLC PRE: 45.78 % (ref 24.87–42.83)
RVTLC REF: 34
RVTLCULN: 43
RVULN: 2.86
SGAW PRE REF: 80.3 %
SGAW PRE: 0.07 1/(CMH2O*S) (ref 0.08–0.08)
SGAW REF: 0.08
TLC LLN: 5.77
TLC PRE REF: 96.2 %
TLC REF: 6.92
TLC ULN: 8.07
ULN IC: ABNORMAL
VA PRE: 5.19 L (ref 6.77–6.77)
VA SINGLE BREATH LLN: 6.77
VA SINGLE BREATH PRE REF: 76.7 %
VA SINGLE BREATH REF: 6.77
VASINGLEBREATHULN: 6.77
VC LLN: 3.73
VC PRE REF: 75.5 %
VC PRE: 3.61 L (ref 3.73–5.85)
VC REF: 4.78
VC ULN: 5.85

## 2022-05-23 PROCEDURE — 1159F PR MEDICATION LIST DOCUMENTED IN MEDICAL RECORD: ICD-10-PCS | Mod: CPTII,S$GLB,, | Performed by: INTERNAL MEDICINE

## 2022-05-23 PROCEDURE — 94726 PLETHYSMOGRAPHY LUNG VOLUMES: CPT | Mod: S$GLB,,, | Performed by: INTERNAL MEDICINE

## 2022-05-23 PROCEDURE — 3044F PR MOST RECENT HEMOGLOBIN A1C LEVEL <7.0%: ICD-10-PCS | Mod: CPTII,S$GLB,, | Performed by: INTERNAL MEDICINE

## 2022-05-23 PROCEDURE — 3078F PR MOST RECENT DIASTOLIC BLOOD PRESSURE < 80 MM HG: ICD-10-PCS | Mod: CPTII,S$GLB,, | Performed by: INTERNAL MEDICINE

## 2022-05-23 PROCEDURE — 3078F DIAST BP <80 MM HG: CPT | Mod: CPTII,S$GLB,, | Performed by: INTERNAL MEDICINE

## 2022-05-23 PROCEDURE — 1160F PR REVIEW ALL MEDS BY PRESCRIBER/CLIN PHARMACIST DOCUMENTED: ICD-10-PCS | Mod: CPTII,S$GLB,, | Performed by: INTERNAL MEDICINE

## 2022-05-23 PROCEDURE — 94726 PULM FUNCT TST PLETHYSMOGRAP: ICD-10-PCS | Mod: S$GLB,,, | Performed by: INTERNAL MEDICINE

## 2022-05-23 PROCEDURE — 94060 EVALUATION OF WHEEZING: CPT | Mod: S$GLB,,, | Performed by: INTERNAL MEDICINE

## 2022-05-23 PROCEDURE — 3074F SYST BP LT 130 MM HG: CPT | Mod: CPTII,S$GLB,, | Performed by: INTERNAL MEDICINE

## 2022-05-23 PROCEDURE — 3044F HG A1C LEVEL LT 7.0%: CPT | Mod: CPTII,S$GLB,, | Performed by: INTERNAL MEDICINE

## 2022-05-23 PROCEDURE — 99213 PR OFFICE/OUTPT VISIT, EST, LEVL III, 20-29 MIN: ICD-10-PCS | Mod: 25,S$GLB,, | Performed by: INTERNAL MEDICINE

## 2022-05-23 PROCEDURE — 99999 PR PBB SHADOW E&M-EST. PATIENT-LVL IV: CPT | Mod: PBBFAC,,, | Performed by: INTERNAL MEDICINE

## 2022-05-23 PROCEDURE — 3074F PR MOST RECENT SYSTOLIC BLOOD PRESSURE < 130 MM HG: ICD-10-PCS | Mod: CPTII,S$GLB,, | Performed by: INTERNAL MEDICINE

## 2022-05-23 PROCEDURE — 1159F MED LIST DOCD IN RCRD: CPT | Mod: CPTII,S$GLB,, | Performed by: INTERNAL MEDICINE

## 2022-05-23 PROCEDURE — 3008F PR BODY MASS INDEX (BMI) DOCUMENTED: ICD-10-PCS | Mod: CPTII,S$GLB,, | Performed by: INTERNAL MEDICINE

## 2022-05-23 PROCEDURE — 94060 PR EVAL OF BRONCHOSPASM: ICD-10-PCS | Mod: S$GLB,,, | Performed by: INTERNAL MEDICINE

## 2022-05-23 PROCEDURE — 99999 PR PBB SHADOW E&M-EST. PATIENT-LVL IV: ICD-10-PCS | Mod: PBBFAC,,, | Performed by: INTERNAL MEDICINE

## 2022-05-23 PROCEDURE — 94729 PR C02/MEMBANE DIFFUSE CAPACITY: ICD-10-PCS | Mod: S$GLB,,, | Performed by: INTERNAL MEDICINE

## 2022-05-23 PROCEDURE — 99213 OFFICE O/P EST LOW 20 MIN: CPT | Mod: 25,S$GLB,, | Performed by: INTERNAL MEDICINE

## 2022-05-23 PROCEDURE — 1160F RVW MEDS BY RX/DR IN RCRD: CPT | Mod: CPTII,S$GLB,, | Performed by: INTERNAL MEDICINE

## 2022-05-23 PROCEDURE — 3008F BODY MASS INDEX DOCD: CPT | Mod: CPTII,S$GLB,, | Performed by: INTERNAL MEDICINE

## 2022-05-23 PROCEDURE — 94729 DIFFUSING CAPACITY: CPT | Mod: S$GLB,,, | Performed by: INTERNAL MEDICINE

## 2022-05-23 RX ORDER — BUDESONIDE AND FORMOTEROL FUMARATE DIHYDRATE 80; 4.5 UG/1; UG/1
2 AEROSOL RESPIRATORY (INHALATION) 2 TIMES DAILY
Qty: 10.2 G | Refills: 11 | Status: SHIPPED | OUTPATIENT
Start: 2022-05-23 | End: 2022-11-16

## 2022-05-23 NOTE — PROGRESS NOTES
Subjective:       Patient ID: Arnie Barrientos III is a 52 y.o. male.    Chief Complaint: COPD    HPI   53 yo male heavy smoker with recent COVID diagnosis in February 2022, presenting back to clinic today as a follow up visit after a sick clinic visit on March 23, 2022. He saw me on that cecily for evaluation of persistent cough and shortness of breath. He was accompanied by his wife, Shanta Gatica and his baby, Dave. Wife corroborated the story on that date.  He was diagnosed with COVID in Feb, when he experienced fatigue and chills, which progressed to shortness of breath. He was evaluated in urgent care on March 5, 2022 and treated with 3 days of steroids and course of outpatient Doxycycline. A Chest CXR performed same day without acute finding. Symptoms initially improved, but persistented and has become worse again. He went to Urgent care on 3/22/22 and was prescribed Duoneb solution for nebulizer and Symbicort. On day of clinic visit, he complained of chills, productive cough and chest congestion with exertional dyspnea. He completed a course of Azithromycin and Prednisone since then. He was informed to use Symbicort 2 puffs twice a day and as needed Albuterol.    Since last visit, he has been doing better. He has residual chronic cough with production of mucus. He uses Symbicort 2 puff once daily. He uses Ventolin regularly, several times per week. He has been having yellow-brown phlegm and nocturnal symptoms for the past several nights. No fever or chills. Does have dyspnea when he over exerts himself. He has been experiencing nocturnal symptoms requiring Ventolin use, with improvement.    He is now established with smoking cessation program. He is on Wellbutrin + Nicotine patches. Smoking a lot less, down from 40 cigarettes to 2-4 cigarettes per day.     Social Hx:  Active smoker of 30+ pack year, > 1 PPD > 0.25 PPD.   Tried Electronic Cigarette in the past, no longer using this.   He was prescribed  Wellbutrin recently by his PCP.    Family Hx:  Strong family history of lung ca.    Pulmonary Function Test 5/23/2022:  FEV1/FVC ratio of 67  FEV1 of 2.40 L (63.7%) -> 2.60 L  FVC of 3.61 L (75.5%) -> 3.85 L  TLC of 96.2%  DLCO of 78.3%    CT CHEST 3/30/22:  There is a left upper lobe nodule measuring 0.3 cm on series 4, image 139.  The lungs show no findings consistent with emphysema.    CXR 3/23/22:  No acute pathology    Review of Systems   Constitutional: Negative for fever, chills, activity change, fatigue, night sweats and weakness.   HENT: Negative for postnasal drip, sinus pressure, sore throat, trouble swallowing and congestion.    Respiratory: Positive for cough, wheezing and dyspnea on extertion. Negative for chest tightness, orthopnea and Paroxysmal Nocturnal Dyspnea.    Cardiovascular: Negative for chest pain, palpitations and leg swelling.   Musculoskeletal: Negative for arthralgias.        Left rib pain   Skin: Negative for rash.   Gastrointestinal: Negative for nausea, vomiting and acid reflux.   Neurological: Negative for light-headedness and headaches.       Objective:      Physical Exam   Constitutional: He is oriented to person, place, and time. He appears well-developed. He is not obese.   HENT:   Head: Normocephalic.   Nose: No mucosal edema.   Mouth/Throat: Oropharynx is clear and moist.   Cardiovascular: Normal rate, regular rhythm and normal heart sounds.   No murmur heard.  Pulmonary/Chest: Normal expansion. No stridor. No respiratory distress. He has no rales.   Abdominal: He exhibits no distension.   Musculoskeletal:         General: No edema.      Cervical back: Normal range of motion and neck supple.   Neurological: He is alert and oriented to person, place, and time.   Skin: Skin is warm and dry.   Psychiatric: He has a normal mood and affect. His behavior is normal. Thought content normal.       Assessment:       1. Chronic obstructive pulmonary disease, unspecified COPD type    2.  Bronchitis    3. Current smoker        Outpatient Encounter Medications as of 5/23/2022   Medication Sig Dispense Refill    albuterol (PROVENTIL/VENTOLIN HFA) 90 mcg/actuation inhaler Inhale 2 puffs into the lungs every 4 (four) hours as needed for Shortness of Breath. Rescue 18 g 0    albuterol (PROVENTIL/VENTOLIN HFA) 90 mcg/actuation inhaler INHALE 2 PUFFS INTO THE LUNGS EVERY 6 (SIX) HOURS AS NEEDED FOR WHEEZING. RESCUE 18 g 6    albuterol-ipratropium (DUO-NEB) 2.5 mg-0.5 mg/3 mL nebulizer solution Take 3 mLs by nebulization every 6 (six) hours as needed for Wheezing. Rescue 75 mL 0    azelastine (ASTELIN) 137 mcg (0.1 %) nasal spray 2 sprays (274 mcg total) by Nasal route 2 (two) times daily. 30 mL 2    azithromycin (ZITHROMAX Z-TESHA) 250 MG tablet Day 1 - 2 tablets Day 2 thru 5 - 1 tablet 6 tablet 0    benzonatate (TESSALON) 200 MG capsule Take 1 capsule by mouth three times a day 30 capsule 0    buPROPion (WELLBUTRIN XL) 150 MG TB24 tablet Take 1 tablet (150 mg total) by mouth once daily. 30 tablet 11    buPROPion (WELLBUTRIN XL) 150 MG TB24 tablet Take 1 tablet by mouth every day. 30 tablet 11    fluticasone propionate (FLONASE) 50 mcg/actuation nasal spray 1 spray (50 mcg total) by Each Nostril route once daily. 16 g 2    loratadine (CLARITIN) 10 mg tablet Take 1 tablet (10 mg total) by mouth once daily. 15 tablet 0    nicotine (NICODERM CQ) 21 mg/24 hr Place 1 patch onto the skin once daily. 14 patch 0    nicotine polacrilex (NICORETTE) 4 MG Gum Take 1 each (4 mg total) by mouth as needed. 100 each 0    predniSONE (DELTASONE) 20 MG tablet Take 1 tablet (20 mg total) by mouth once daily. 10 tablet 0    [DISCONTINUED] budesonide-formoterol 80-4.5 mcg (SYMBICORT) 80-4.5 mcg/actuation HFAA Inhale 2 puffs into the lungs once daily at 6am. Controller 10 g 1    budesonide-formoterol 80-4.5 mcg (SYMBICORT) 80-4.5 mcg/actuation HFAA Inhale 2 puffs into the lungs 2 (two) times a day. Controller 10.2 g  11    tiotropium bromide (SPIRIVA RESPIMAT) 1.25 mcg/actuation inhaler Inhale 2 puffs into the lungs once daily. Controller 4 g 11     No facility-administered encounter medications on file as of 5/23/2022.     Orders Placed This Encounter   Procedures    CT Chest Lung Screening Low Dose     Standing Status:   Future     Standing Expiration Date:   5/23/2024     Order Specific Question:   Is there documentation of shared decision making for this lung screening exam?     Answer:   Yes     Order Specific Question:   Is the patient a current smoker?     Answer:   Yes     Order Specific Question:   Is the patient a current or former smoker who quit within the past 15 years?     Answer:   Yes     Order Specific Question:   Is the patient between the age 50-80 years old?     Answer:   Yes     Order Specific Question:   Has the patient smoked 20 or more packs of cigarettes/year?     Answer:   Yes     Order Specific Question:   Does the patient show any signs or symptoms of lung cancer?     Answer:   No     Order Specific Question:   Is this the first (baseline) CT or an annual exam?     Answer:   Annual [2]     Order Specific Question:   May the Radiologist modify the order per protocol to meet the clinical needs of the patient?     Answer:   Yes     Order Specific Question:   Is this a low dose screening chest CT?     Answer:   Yes     Order Specific Question:   Is this a low dose screening chest CT?     Answer:   Yes       Plan:            COPD  - Symbicort 2 puffs twice a day with mouth rinse after - he had been using it only once daily  - Add Spiriva 2 inh daily  - continue PRN inhalers - prescribed Combivent. If patient does not find good relief with this, then he was instructed to go back to Albuterol PRN  - 1 year follow up CT chest for screening    Active Smoker  - est'd with smoking cessation program  - on Wellbutrin + Nicotine patch  - re- on smoking cessation, 8 minutes spent      Chronic sinusitis,  could be allergic  - use Flonase and Azelastine ordered by PCP, encouraged to use as well    Follow up 6 months

## 2022-05-29 ENCOUNTER — PATIENT MESSAGE (OUTPATIENT)
Dept: PULMONOLOGY | Facility: CLINIC | Age: 52
End: 2022-05-29
Payer: COMMERCIAL

## 2022-05-29 DIAGNOSIS — J44.9 CHRONIC OBSTRUCTIVE PULMONARY DISEASE, UNSPECIFIED COPD TYPE: Primary | ICD-10-CM

## 2022-05-30 RX ORDER — PREDNISONE 20 MG/1
20 TABLET ORAL DAILY
Qty: 10 TABLET | Refills: 0 | Status: SHIPPED | OUTPATIENT
Start: 2022-05-30 | End: 2022-11-16

## 2022-05-30 NOTE — TELEPHONE ENCOUNTER
Called to review patient's symptoms with wife. Persistent worsened cough, mainly dry. Requiring more frequent use of nebulizer and improvement in wheezing and dyspnea. Will prescribe for 5 days of Prednisone 20 mg daily. (5 extra doses for as needed at a later time).

## 2022-06-03 ENCOUNTER — PATIENT MESSAGE (OUTPATIENT)
Dept: PULMONOLOGY | Facility: CLINIC | Age: 52
End: 2022-06-03
Payer: COMMERCIAL

## 2022-06-03 DIAGNOSIS — R05.9 COUGH: Primary | ICD-10-CM

## 2022-06-03 DIAGNOSIS — J40 BRONCHITIS: Primary | ICD-10-CM

## 2022-06-03 DIAGNOSIS — J44.9 CHRONIC OBSTRUCTIVE PULMONARY DISEASE, UNSPECIFIED COPD TYPE: ICD-10-CM

## 2022-06-03 DIAGNOSIS — J40 BRONCHITIS: ICD-10-CM

## 2022-06-03 NOTE — TELEPHONE ENCOUNTER
Mr. Barrientos has a productive cough (brown sputum) according to his wife. Just started Prednisone 2 days ago. Will request for a chest X-ray now and will be in touch to review the image.

## 2022-06-04 ENCOUNTER — HOSPITAL ENCOUNTER (OUTPATIENT)
Dept: RADIOLOGY | Facility: HOSPITAL | Age: 52
Discharge: HOME | End: 2022-06-04
Attending: INTERNAL MEDICINE
Payer: COMMERCIAL

## 2022-06-04 DIAGNOSIS — J44.9 CHRONIC OBSTRUCTIVE PULMONARY DISEASE, UNSPECIFIED COPD TYPE: ICD-10-CM

## 2022-06-04 DIAGNOSIS — R05.9 COUGH: ICD-10-CM

## 2022-06-04 DIAGNOSIS — J40 BRONCHITIS: ICD-10-CM

## 2022-06-04 PROCEDURE — 71046 XR CHEST PA AND LATERAL: ICD-10-PCS | Mod: 26,,, | Performed by: RADIOLOGY

## 2022-06-04 PROCEDURE — 71046 X-RAY EXAM CHEST 2 VIEWS: CPT | Mod: TC,PO

## 2022-06-04 PROCEDURE — 71046 X-RAY EXAM CHEST 2 VIEWS: CPT | Mod: 26,,, | Performed by: RADIOLOGY

## 2022-06-04 RX ORDER — AZITHROMYCIN 250 MG/1
TABLET, FILM COATED ORAL
Qty: 6 TABLET | Refills: 0 | Status: SHIPPED | OUTPATIENT
Start: 2022-06-04 | End: 2022-10-26 | Stop reason: SDUPTHER

## 2022-06-04 NOTE — TELEPHONE ENCOUNTER
Called to review chest x-ray. Unremarkable study. Mr. Barrientos started taking Prednisone last night. Will prescribe Z-preston for 5 days. Recommended Flonase + Azelastine 1 spray each nostril twice a day. If worsening symptoms, shortness of breath or hypoxia, recommended to go to urgent care/ER.

## 2022-06-21 ENCOUNTER — PATIENT MESSAGE (OUTPATIENT)
Dept: PULMONOLOGY | Facility: CLINIC | Age: 52
End: 2022-06-21
Payer: COMMERCIAL

## 2022-06-24 ENCOUNTER — PATIENT MESSAGE (OUTPATIENT)
Dept: PULMONOLOGY | Facility: CLINIC | Age: 52
End: 2022-06-24
Payer: COMMERCIAL

## 2022-06-24 RX ORDER — IPRATROPIUM BROMIDE AND ALBUTEROL SULFATE 2.5; .5 MG/3ML; MG/3ML
3 SOLUTION RESPIRATORY (INHALATION) EVERY 6 HOURS PRN
Qty: 360 ML | Refills: 2 | Status: SHIPPED | OUTPATIENT
Start: 2022-06-24 | End: 2023-12-28

## 2022-07-27 DIAGNOSIS — Z12.11 COLON CANCER SCREENING: ICD-10-CM

## 2022-07-29 ENCOUNTER — PATIENT OUTREACH (OUTPATIENT)
Dept: ADMINISTRATIVE | Facility: HOSPITAL | Age: 52
End: 2022-07-29
Payer: COMMERCIAL

## 2022-07-29 NOTE — PROGRESS NOTES
Non-compliant GAP report chart review - Chart review completed for the following HM test if overdue  ( Colonoscopy,  Screening,  Diabetic lab testing, and/or Dilated EYE EXAM)  07/29/2022    Care Everywhere and Media reports - updates requested and reviewed.             Health Maintenance Due   Topic Date Due    Pneumococcal Vaccines (Age 0-64) (1 - PCV) Never done    Colorectal Cancer Screening  Never done    Shingles Vaccine (1 of 2) Never done    COVID-19 Vaccine (3 - Booster for Pfizer series) 10/02/2021

## 2022-08-01 ENCOUNTER — PATIENT MESSAGE (OUTPATIENT)
Dept: ADMINISTRATIVE | Facility: HOSPITAL | Age: 52
End: 2022-08-01
Payer: COMMERCIAL

## 2022-08-09 ENCOUNTER — PATIENT OUTREACH (OUTPATIENT)
Dept: ADMINISTRATIVE | Facility: HOSPITAL | Age: 52
End: 2022-08-09
Payer: COMMERCIAL

## 2022-08-10 ENCOUNTER — PATIENT OUTREACH (OUTPATIENT)
Dept: ADMINISTRATIVE | Facility: HOSPITAL | Age: 52
End: 2022-08-10
Payer: COMMERCIAL

## 2022-08-19 ENCOUNTER — PATIENT OUTREACH (OUTPATIENT)
Dept: ADMINISTRATIVE | Facility: HOSPITAL | Age: 52
End: 2022-08-19
Payer: COMMERCIAL

## 2022-10-03 ENCOUNTER — PATIENT MESSAGE (OUTPATIENT)
Dept: ADMINISTRATIVE | Facility: HOSPITAL | Age: 52
End: 2022-10-03
Payer: COMMERCIAL

## 2022-10-25 ENCOUNTER — PATIENT MESSAGE (OUTPATIENT)
Dept: PULMONOLOGY | Facility: CLINIC | Age: 52
End: 2022-10-25
Payer: COMMERCIAL

## 2022-10-25 DIAGNOSIS — J40 BRONCHITIS: ICD-10-CM

## 2022-10-26 ENCOUNTER — PATIENT MESSAGE (OUTPATIENT)
Dept: PULMONOLOGY | Facility: CLINIC | Age: 52
End: 2022-10-26
Payer: COMMERCIAL

## 2022-10-26 RX ORDER — AZITHROMYCIN 250 MG/1
TABLET, FILM COATED ORAL
Qty: 6 TABLET | Refills: 0 | Status: SHIPPED | OUTPATIENT
Start: 2022-10-26 | End: 2023-05-23

## 2022-11-09 ENCOUNTER — CLINICAL SUPPORT (OUTPATIENT)
Dept: SMOKING CESSATION | Facility: CLINIC | Age: 52
End: 2022-11-09
Payer: COMMERCIAL

## 2022-11-09 DIAGNOSIS — F17.200 NICOTINE DEPENDENCE: Primary | ICD-10-CM

## 2022-11-09 PROCEDURE — 99999 PR PBB SHADOW E&M-EST. PATIENT-LVL I: ICD-10-PCS | Mod: PBBFAC,,,

## 2022-11-09 PROCEDURE — 99407 PR TOBACCO USE CESSATION INTENSIVE >10 MINUTES: ICD-10-PCS | Mod: S$GLB,,,

## 2022-11-09 PROCEDURE — 99407 BEHAV CHNG SMOKING > 10 MIN: CPT | Mod: S$GLB,,,

## 2022-11-09 PROCEDURE — 99999 PR PBB SHADOW E&M-EST. PATIENT-LVL I: CPT | Mod: PBBFAC,,,

## 2022-11-09 NOTE — PROGRESS NOTES
Called pt to f/u on his 6 month smoking cessation quit status. Pt;'s wife stated he is still smoking, but has cut back from 2 ppd to 4 cpd. Informed her he has benefits available and is able to rejoin. Pt scheduled for quit #2 on 11/10/22. Informed her of benefit period, phone follow ups, and contact information. Will complete smart form and will continue to follow up on quit #2 episode.

## 2022-11-12 RX ORDER — BENZONATATE 200 MG/1
CAPSULE ORAL
Qty: 30 CAPSULE | Refills: 0 | OUTPATIENT
Start: 2022-11-12

## 2022-11-14 ENCOUNTER — E-VISIT (OUTPATIENT)
Dept: FAMILY MEDICINE | Facility: CLINIC | Age: 52
End: 2022-11-14
Payer: COMMERCIAL

## 2022-11-14 ENCOUNTER — TELEPHONE (OUTPATIENT)
Dept: PULMONOLOGY | Facility: CLINIC | Age: 52
End: 2022-11-14
Payer: COMMERCIAL

## 2022-11-14 ENCOUNTER — PATIENT MESSAGE (OUTPATIENT)
Dept: FAMILY MEDICINE | Facility: CLINIC | Age: 52
End: 2022-11-14

## 2022-11-14 DIAGNOSIS — R09.89 SYMPTOMS OF UPPER RESPIRATORY INFECTION (URI): Primary | ICD-10-CM

## 2022-11-14 PROCEDURE — 99499 UNLISTED E&M SERVICE: CPT | Mod: S$GLB,,, | Performed by: INTERNAL MEDICINE

## 2022-11-14 PROCEDURE — 99499 NO LOS: ICD-10-PCS | Mod: S$GLB,,, | Performed by: INTERNAL MEDICINE

## 2022-11-14 NOTE — Clinical Note
The patient needs to be evaluated today.  Test for ordered during the visit but I prefer that he is evaluated in person by a provider or at the ER.

## 2022-11-14 NOTE — TELEPHONE ENCOUNTER
----- Message from [place] sent at 11/14/2022 11:58 AM CST -----  Regarding: FW: Same day appt  Contact: pt [phone]    ----- Message -----  From: Jacquelin Perera  Sent: 11/14/2022  11:53 AM CST  To: [facility] Staff  Subject: Same day appt                                    Pt wife is calling to to get a same day appt, wife said that her  is doing really bad and would like to schedule a appt for today. Please call

## 2022-11-14 NOTE — TELEPHONE ENCOUNTER
Spoke with patient wife, informed her that I have received her message. I also advised patient has appointment with Dr Mcdonald on 11/16/22 for 3:30 pm and that patient can go to the nearest emergency room. Patient wife verbalized that she understand.

## 2022-11-15 ENCOUNTER — PATIENT MESSAGE (OUTPATIENT)
Dept: FAMILY MEDICINE | Facility: CLINIC | Age: 52
End: 2022-11-15
Payer: COMMERCIAL

## 2022-11-15 ENCOUNTER — TELEPHONE (OUTPATIENT)
Dept: FAMILY MEDICINE | Facility: CLINIC | Age: 52
End: 2022-11-15
Payer: COMMERCIAL

## 2022-11-15 NOTE — TELEPHONE ENCOUNTER
----- Message from Samantha Moralez MD sent at 11/15/2022  8:31 AM CST -----  The patient needs to be evaluated today.  Test for ordered during the visit but I prefer that he is evaluated in person by a provider or at the ER.

## 2022-11-15 NOTE — PROGRESS NOTES
Patient ID: Arnie Barrientos III is a 52 y.o. male.    Chief Complaint: URI (The patient reports has been having 2-3 days of increased shortness of breath, cough, fatigue, and headache.  No history of fever or close contact with infections.  Has been using nebulizer more often with persistent symptoms.)    The patient initiated a request through [employer] on 11/14/2022 for evaluation and management with a chief complaint of URI (The patient reports has been having 2-3 days of increased shortness of breath, cough, fatigue, and headache.  No history of fever or close contact with infections.  Has been using nebulizer more often with persistent symptoms.)     I evaluated the questionnaire submission on 11/15/2022.    [facility] Peq Evisit Upper Respitatory/Cough Questionnaire    11/14/2022  7:30 PM CST - Filed by Patient   Do you agree to participate in an E-Visit? Yes   If you have any of the following symptoms, please present to your local ER or call 911:  I acknowledge   What is the main issue that you would like for your doctor to address today? Cough and SOB   Are you able to take your vital signs? No   What symptoms do you currently have?  Cough;  Fatigue;  Headache   Have you had a fever? No   When did your symptoms first appear? 11/12/2022   In the last two weeks, have you been in close contact with someone who has COVID-19 or the Flu? No   In the last two weeks, have you worked or volunteered in a healthcare facility or as a ? Healthcare facilities include a hospital, medical or dental clinic, long-term care facility, or nursing home No   Do you live in a long-term care facility, nursing home, or homeless shelter? No   List what you have done or taken to help your symptoms. Nebulizer treatment, inhalers   How severe are your symptoms? Severe   Have you taken an at home Covid test? No   Have you taken a Flu test? No   Have you been fully vaccinated for COVID? (2 Pfizer, 2 Moderna or 1 [employer]  vaccine injections) Yes   Have you received a booster? No   Have you recieved a Flu shot? No   Do you have transportation to get tested for COVID if it is indicated and ordered for you at an [facility] location? No   Provide any information you feel is important to your history not asked above COPD   Please attach any relevant images or files         Active Problem List with Overview Notes    Diagnosis Date Noted    Cough     Bronchitis     Current smoker     Impaired gait and mobility 06/27/2018    Decreased range of motion of ankle 06/27/2018    Ankle weakness 06/27/2018    Closed right pilon fracture, initial encounter 04/06/2018    Ankle pain, right 03/25/2018      Recent Labs Obtained:  No visits with results within 7 Day(s) from this visit.   Latest known visit with results is:   Hospital Outpatient Visit on 05/23/2022   Component Date Value Ref Range Status    Physician Comment 05/23/2022    Final                    Value:Spirometry shows moderate obstruction. Lung volume determination shows TLC is normal with evidence air trapping is present. Spirometry shows borderline improvement following bronchodilator. DLCO is normal.   Â   Notes:  Â   The failure to demonstrate improvement in spirometry does not preclude a clinical response to a trial of bronchodilators. No recent hemoglobin value available. DLCO interpretation assumes a normal hemoglobin value.       Post FVC 05/23/2022 3.85  3.73 - 5.85 L Final    Post FEV5 05/23/2022 1.95  1.74 - 4.01 L Final    Post FEV1 05/23/2022 2.60 (L)  2.93 - 4.57 L Final    Post FEV1 FVC 05/23/2022 67.35 (L)  68.02 - 88.57 % Final    Post FEF 25 75 05/23/2022 1.56 (L)  1.84 - 5.51 L/s Final    Post PEF 05/23/2022 7.10 (L)  7.30 - 11.81 L/s Final    Post  05/23/2022 6.04  sec Final    Pre DLCO 05/23/2022 23.55  23.16 - 37.01 ml/(min*mmHg) Final    DLCOVA PRE 05/23/2022 4.53  3.11 - 5.58 ml/(min*mmHg*L) Final    VA PRE 05/23/2022 5.19 (L)  6.77 - 6.77 L Final    IVC PRE  05/23/2022 3.44 (L)  3.73 - 5.85 L Final    Pre TLC 05/23/2022 6.66  5.77 - 8.07 L Final    VC PRE 05/23/2022 3.61 (L)  3.73 - 5.85 L Final    PRE IC 05/23/2022 2.14  -9442420.67 - 65030738.33 L Final    Pre FRC PL 05/23/2022 4.52 (H)  2.48 - 4.46 L Final    Pre ERV 05/23/2022 1.47  -07179.72 - 19421.28 L Final    Pre RV 05/23/2022 3.05 (H)  1.51 - 2.86 L Final    RVTLC PRE 05/23/2022 45.78 (H)  24.87 - 42.83 % Final    Raw PRE 05/23/2022 3.12 (H)  3.06 - 3.06 cmH2O*s/L Final    sGaw PRE 05/23/2022 0.07 (L)  0.08 - 0.08 1/(cmH2O*s) Final    Pre VTG 05/23/2022 4.63  L Final    Pre FVC 05/23/2022 3.61 (L)  3.73 - 5.85 L Final    PRE FEV5 05/23/2022 1.76  1.74 - 4.01 L Final    Pre FEV1 05/23/2022 2.40 (L)  2.93 - 4.57 L Final    Pre FEV1 FVC 05/23/2022 66.49 (L)  68.02 - 88.57 % Final    Pre FEF 25 75 05/23/2022 1.36 (L)  1.84 - 5.51 L/s Final    Pre PEF 05/23/2022 6.33 (L)  7.30 - 11.81 L/s Final    Pre  05/23/2022 6.25  sec Final    FVC Ref 05/23/2022 4.78   Final    FVC LLN 05/23/2022 3.73   Final    FVC Pre Ref 05/23/2022 75.5  % Final    FEV05 REF 05/23/2022 2.87   Final    FEV05 LLN 05/23/2022 1.74   Final    PRE FEV05 REF 05/23/2022 61.3  % Final    FEV1 Ref 05/23/2022 3.77   Final    FEV1 LLN 05/23/2022 2.93   Final    FEV1 Pre Ref 05/23/2022 63.7  % Final    FEV1 FVC Ref 05/23/2022 79   Final    FEV1 FVC LLN 05/23/2022 68   Final    FEV1 FVC Pre Ref 05/23/2022 84.1  % Final    FEF 25 75 Ref 05/23/2022 3.43   Final    FEF 25 75 LLN 05/23/2022 1.84   Final    FEF 25 75 Pre Ref 05/23/2022 39.7  % Final    PEF Ref 05/23/2022 9.55   Final    PEF LLN 05/23/2022 7.30   Final    PEF Pre Ref 05/23/2022 66.3  % Final    FVC Chg 05/23/2022 6.7  % Final    FEV1 Chg 05/23/2022 8.1  % Final    SUR426 Chg 05/23/2022 -3.3  % Final    FVC VOL CHG 05/23/2022 0.24   Final    FEV1 VOL CHG 05/23/2022 0.19   Final    TLC Ref 05/23/2022 6.92   Final    TLC LLN 05/23/2022 5.77   Final    TLC ULN 05/23/2022 8.07   Final    TLC  Pre Ref 05/23/2022 96.2  % Final    VC Ref 05/23/2022 4.78   Final    VC LLN 05/23/2022 3.73   Final    VC ULN 05/23/2022 5.85   Final    VC Pre Ref 05/23/2022 75.5  % Final    REF IC 05/23/2022 3.33   Final    LLN IC 05/23/2022 -[id number]   Final    ULN IC 05/23/2022 24221563.33   Final    PRE REF IC 05/23/2022 64.3  % Final    FRCpleth Ref 05/23/2022 3.47   Final    FRCpleth LLN 05/23/2022 2.48   Final    FRC PLETH ULN 05/23/2022 4.46   Final    FRCpleth PreRef 05/23/2022 130.2  % Final    ERV Ref 05/23/2022 1.28   Final    ERV LLN 05/23/2022 -69645.72   Final    ERV ULN 05/23/2022 47721.28   Final    ERV Pre Ref 05/23/2022 114.5  % Final    RV Ref 05/23/2022 2.19   Final    RV LLN 05/23/2022 1.51   Final    RV ULN 05/23/2022 2.86   Final    RV Pre Ref 05/23/2022 139.3  % Final    RVTLC Ref 05/23/2022 34   Final    RVTLC LLN 05/23/2022 25   Final    RV TLC ULN 05/23/2022 43   Final    RVTLC Pre Ref 05/23/2022 135.2  % Final    Raw Ref 05/23/2022 3.06   Final    Raw Pre Ref 05/23/2022 102.0  % Final    sGaw Ref 05/23/2022 0.08   Final    sGaw Pre Ref 05/23/2022 80.3  % Final    DLCO Single Breath Ref 05/23/2022 30.09   Final    DLCO Single Breath LLN 05/23/2022 23.16   Final    DLCO SINGLEBREATH ULN 05/23/2022 37.01   Final    DLCO Single Breath Pre Ref 05/23/2022 78.3  % Final    DLCOc Single Breath Ref 05/23/2022 30.09   Final    DLCOc Single Breath LLN 05/23/2022 23.16   Final    DLCOC SINGLEBREATH ULN 05/23/2022 37.01   Final    DLCOVA Ref 05/23/2022 4.35   Final    DLCOVA LLN 05/23/2022 3.11   Final    DLCOVA ULN 05/23/2022 5.58   Final    DLCOVA Pre Ref 05/23/2022 104.3  % Final    DLCOc SBVA Ref 05/23/2022 4.35   Final    DLCOc SBVA LLN 05/23/2022 3.11   Final    DLCOCSBVA ULN 05/23/2022 5.58   Final    VA Single Breath Ref 05/23/2022 6.77   Final    VA Single Breath LLN 05/23/2022 6.77   Final    VA SINGLEBREATH ULN 05/23/2022 6.77   Final    VA Single Breath Pre Ref 05/23/2022 76.7  % Final    IVC Single  Breath Ref 05/23/2022 4.78   Final    IVC Single Breath LLN 05/23/2022 3.73   Final    IVC SINGLEBREATH ULN 05/23/2022 5.85   Final    IVC Single Breath Pre Ref 05/23/2022 71.9  % Final       Encounter Diagnosis   Name Primary?    Symptoms of upper respiratory infection (URI) Yes       Recommend in person evaluation.      No orders of the defined types were placed in this encounter.           E-Visit Time Tracking:

## 2022-11-15 NOTE — TELEPHONE ENCOUNTER
Patient states he will wait until tomorrow to be seen if he is not able to wait he will go to closest urgent care today

## 2022-11-16 ENCOUNTER — HOSPITAL ENCOUNTER (OUTPATIENT)
Dept: RADIOLOGY | Facility: HOSPITAL | Age: 52
Discharge: HOME | End: 2022-11-16
Attending: INTERNAL MEDICINE
Payer: COMMERCIAL

## 2022-11-16 ENCOUNTER — OFFICE VISIT (OUTPATIENT)
Dept: PULMONOLOGY | Facility: CLINIC | Age: 52
End: 2022-11-16
Payer: COMMERCIAL

## 2022-11-16 VITALS
DIASTOLIC BLOOD PRESSURE: 84 MMHG | OXYGEN SATURATION: 97 % | WEIGHT: 173.31 LBS | HEART RATE: 90 BPM | HEIGHT: 69 IN | SYSTOLIC BLOOD PRESSURE: 128 MMHG | BODY MASS INDEX: 25.67 KG/M2

## 2022-11-16 DIAGNOSIS — F17.210 CIGARETTE SMOKER: ICD-10-CM

## 2022-11-16 DIAGNOSIS — J40 BRONCHITIS: ICD-10-CM

## 2022-11-16 DIAGNOSIS — R06.02 SHORTNESS OF BREATH: Primary | ICD-10-CM

## 2022-11-16 DIAGNOSIS — R06.02 SHORTNESS OF BREATH: ICD-10-CM

## 2022-11-16 DIAGNOSIS — Z12.2 SCREENING FOR LUNG CANCER: ICD-10-CM

## 2022-11-16 DIAGNOSIS — J44.9 CHRONIC OBSTRUCTIVE PULMONARY DISEASE, UNSPECIFIED COPD TYPE: ICD-10-CM

## 2022-11-16 DIAGNOSIS — K21.9 GASTROESOPHAGEAL REFLUX DISEASE, UNSPECIFIED WHETHER ESOPHAGITIS PRESENT: ICD-10-CM

## 2022-11-16 PROCEDURE — 3008F BODY MASS INDEX DOCD: CPT | Mod: CPTII,S$GLB,, | Performed by: INTERNAL MEDICINE

## 2022-11-16 PROCEDURE — 1160F RVW MEDS BY RX/DR IN RCRD: CPT | Mod: CPTII,S$GLB,, | Performed by: INTERNAL MEDICINE

## 2022-11-16 PROCEDURE — 3074F SYST BP LT 130 MM HG: CPT | Mod: CPTII,S$GLB,, | Performed by: INTERNAL MEDICINE

## 2022-11-16 PROCEDURE — 99999 PR PBB SHADOW E&M-EST. PATIENT-LVL IV: ICD-10-PCS | Mod: PBBFAC,,, | Performed by: INTERNAL MEDICINE

## 2022-11-16 PROCEDURE — 1159F PR MEDICATION LIST DOCUMENTED IN MEDICAL RECORD: ICD-10-PCS | Mod: CPTII,S$GLB,, | Performed by: INTERNAL MEDICINE

## 2022-11-16 PROCEDURE — 99999 PR PBB SHADOW E&M-EST. PATIENT-LVL IV: CPT | Mod: PBBFAC,,, | Performed by: INTERNAL MEDICINE

## 2022-11-16 PROCEDURE — 3074F PR MOST RECENT SYSTOLIC BLOOD PRESSURE < 130 MM HG: ICD-10-PCS | Mod: CPTII,S$GLB,, | Performed by: INTERNAL MEDICINE

## 2022-11-16 PROCEDURE — 3079F DIAST BP 80-89 MM HG: CPT | Mod: CPTII,S$GLB,, | Performed by: INTERNAL MEDICINE

## 2022-11-16 PROCEDURE — 3044F HG A1C LEVEL LT 7.0%: CPT | Mod: CPTII,S$GLB,, | Performed by: INTERNAL MEDICINE

## 2022-11-16 PROCEDURE — 99215 OFFICE O/P EST HI 40 MIN: CPT | Mod: S$GLB,,, | Performed by: INTERNAL MEDICINE

## 2022-11-16 PROCEDURE — 1160F PR REVIEW ALL MEDS BY PRESCRIBER/CLIN PHARMACIST DOCUMENTED: ICD-10-PCS | Mod: CPTII,S$GLB,, | Performed by: INTERNAL MEDICINE

## 2022-11-16 PROCEDURE — 71046 XR CHEST PA AND LATERAL: ICD-10-PCS | Mod: 26,,, | Performed by: RADIOLOGY

## 2022-11-16 PROCEDURE — 99215 PR OFFICE/OUTPT VISIT, EST, LEVL V, 40-54 MIN: ICD-10-PCS | Mod: S$GLB,,, | Performed by: INTERNAL MEDICINE

## 2022-11-16 PROCEDURE — 3079F PR MOST RECENT DIASTOLIC BLOOD PRESSURE 80-89 MM HG: ICD-10-PCS | Mod: CPTII,S$GLB,, | Performed by: INTERNAL MEDICINE

## 2022-11-16 PROCEDURE — 71046 X-RAY EXAM CHEST 2 VIEWS: CPT | Mod: 26,,, | Performed by: RADIOLOGY

## 2022-11-16 PROCEDURE — 3044F PR MOST RECENT HEMOGLOBIN A1C LEVEL <7.0%: ICD-10-PCS | Mod: CPTII,S$GLB,, | Performed by: INTERNAL MEDICINE

## 2022-11-16 PROCEDURE — 1159F MED LIST DOCD IN RCRD: CPT | Mod: CPTII,S$GLB,, | Performed by: INTERNAL MEDICINE

## 2022-11-16 PROCEDURE — 71046 X-RAY EXAM CHEST 2 VIEWS: CPT | Mod: TC,FY

## 2022-11-16 PROCEDURE — 3008F PR BODY MASS INDEX (BMI) DOCUMENTED: ICD-10-PCS | Mod: CPTII,S$GLB,, | Performed by: INTERNAL MEDICINE

## 2022-11-16 RX ORDER — MONTELUKAST SODIUM 10 MG/1
10 TABLET ORAL NIGHTLY
Qty: 90 TABLET | Refills: 4 | Status: SHIPPED | OUTPATIENT
Start: 2022-11-16 | End: 2023-02-14

## 2022-11-16 RX ORDER — PANTOPRAZOLE SODIUM 40 MG/1
40 TABLET, DELAYED RELEASE ORAL DAILY
Qty: 30 TABLET | Refills: 2 | Status: SHIPPED | OUTPATIENT
Start: 2022-11-16 | End: 2023-11-16

## 2022-11-16 RX ORDER — BUDESONIDE AND FORMOTEROL FUMARATE DIHYDRATE 80; 4.5 UG/1; UG/1
AEROSOL RESPIRATORY (INHALATION)
Qty: 10.2 G | Refills: 11 | Status: SHIPPED | OUTPATIENT
Start: 2022-11-16 | End: 2024-01-02 | Stop reason: SDUPTHER

## 2022-11-16 RX ORDER — PREDNISONE 10 MG/1
10 TABLET ORAL DAILY
Qty: 18 TABLET | Refills: 0 | Status: SHIPPED | OUTPATIENT
Start: 2022-11-16 | End: 2023-05-23

## 2022-11-16 NOTE — PROGRESS NOTES
Subjective:    Established     Patient ID: Arnie Barrientos III is a 52 y.o. male.    Chief Complaint: No chief complaint on file.    HPI   51 yo male smoker who is an established patient of pulmonary. Seen last in May 2022. Since then, he had two episodes of acute dyspnea. Once in early June 2022 and again in October 25, 2022. He was treated with steroid and Azithromycin both times. He again is experiencing shortness of breath for the past 7 days associated with cough and congestion. Dyspnea with exertion and at night. Has nightly symptoms of cough, chest tightness, wheezing and shortness of breath. Cough is productive of dark-brown colored phlegm, no hemoptysis. He is requiring frequent as needed ventolin dosing at night. He has been using nebulizer therapy up to 4 times daily. He has no fever, chills nor sick contacts. He had a Z-pac which he just completed. He has associated nasal congestion and occasional nasal drip. He has been requiring to sleep upright for the past few nights. He also has sensation of abdominal bloating relieved with tums. He had a CXR ordered and performed today prior to clinic visit, which was reviewed and was unremarkable for acute process.    Overall, in between these attacks, he denies significant symptoms aside from some dyspnea with overexertion. He does not have chronic cough. He does not use nebulizer therapy most days and uses Ventolin only a few times a week. He continues to smoke cigarettes, 4 cigarettes daily. He does notice that symptoms are better when he does cut down from cigarette use.    He reports triggers include change in weather and when he is exposed to fumes or pollen. He has sinus congestion and teary eyes with changes in weather. He reports allergies to certain trees.    His prior history of illness includes:  His respiratory symptoms started around 2020, but were mild and tolerable. Had COVID in Feb 2022 when he experienced fatigue and chills, which progressed to  shortness of breath. He was evaluated in urgent care on March 5, 2022 and treated with 3 days of steroids and course of outpatient Doxycycline. A Chest CXR performed same day without acute finding. Symptoms initially improved, but persistented and has become worse again. He went to Urgent care on 3/22/22 and was prescribed Duoneb solution for nebulizer and Symbicort. On day of clinic visit in March, he complained of chills, productive cough and chest congestion with exertional dyspnea and completed course of Azithromycin and Prednisone since then.       Current inhaler/nebulizer use:   Symbicort 80-4.5 2 puffs daily. He is not using it twice a day.  Spiriva 1.25 mcg Respimat 2 puffs daily  Nebulizer with duonebs      Smoking Status:   Still smoking 4 cigarettes a day. Down from 40 cigarettes to 2-4 cigarettes per day.  Tried Electronic Cigarette in the past, no longer using this.   He is on Wellbutrin      Family Hx:  Strong family history of lung ca.    Pulmonary Function Test 5/23/2022:  FEV1/FVC ratio of 67  FEV1 of 2.40 L (63.7%) -> 2.60 L  FVC of 3.61 L (75.5%) -> 3.85 L  TLC of 96.2%  DLCO of 78.3%    CT CHEST 3/30/22:  There is a left upper lobe nodule measuring 0.3 cm on series 4, image 139.  The lungs show no findings consistent with emphysema.    CXR 3/23/22:  No acute pathology    CXR 6/4/2:  No acute pathology    CXR 11/16/22:  No acute pathology    Objective:      Physical Exam   Constitutional: He is oriented to person, place, and time. He appears well-developed. He is not obese.   HENT:   Head: Normocephalic.   Nose: No mucosal edema.   Mouth/Throat: Oropharynx is clear and moist.   Cardiovascular: Normal rate, regular rhythm and normal heart sounds.   No murmur heard.  Pulmonary/Chest: Normal expansion. No stridor.   B/l expiratory wheezing and mid inspiratory squeak   Abdominal: He exhibits no distension.   Musculoskeletal:         General: No edema.      Cervical back: Normal range of motion and  neck supple.   Neurological: He is alert and oriented to person, place, and time.   Skin: Skin is warm and dry.   Psychiatric: He has a normal mood and affect. His behavior is normal. Thought content normal.   Vitals reviewed.    Assessment:       1. Shortness of breath    2. Bronchitis    3. Chronic obstructive pulmonary disease, unspecified COPD type    4. Gastroesophageal reflux disease, unspecified whether esophagitis present    5. Screening for lung cancer    6. Cigarette smoker          Outpatient Encounter Medications as of 11/16/2022   Medication Sig Dispense Refill    albuterol (PROVENTIL/VENTOLIN HFA) 90 mcg/actuation inhaler INHALE 2 PUFFS INTO THE LUNGS EVERY 6 (SIX) HOURS AS NEEDED FOR WHEEZING. RESCUE 18 g 6    albuterol-ipratropium (DUO-NEB) 2.5 mg-0.5 mg/3 mL nebulizer solution Inhale contents of one vial (3 mL) by nebulization every 6 (six) hours as needed for Wheezing. Rescue 360 mL 2    azelastine (ASTELIN) 137 mcg (0.1 %) nasal spray instill 2 sprays (274 mcg total) by Nasal route 2 (two) times daily. 30 mL 2    azithromycin (ZITHROMAX Z-TESHA) 250 MG tablet Day 1: take 2 tablets; day 2-5: take 1 tablet 6 tablet 0    benzonatate (TESSALON) 200 MG capsule Take 1 capsule by mouth three times a day 30 capsule 0    buPROPion (WELLBUTRIN XL) 150 MG TB24 tablet Take 1 tablet (150 mg total) by mouth once daily. 30 tablet 11    buPROPion (WELLBUTRIN XL) 150 MG TB24 tablet Take 1 tablet by mouth every day. 30 tablet 11    fluticasone propionate (FLONASE) 50 mcg/actuation nasal spray 1 spray (50 mcg total) by Each Nostril route once daily. 16 g 2    nicotine (NICODERM CQ) 21 mg/24 hr Place 1 patch onto the skin once daily. 14 patch 0    nicotine polacrilex (NICORETTE) 4 MG Gum Take 1 each (4 mg total) by mouth as needed. 100 each 0    tiotropium bromide (SPIRIVA RESPIMAT) 1.25 mcg/actuation inhaler Inhale 2 puffs into the lungs once daily. Controller 4 g 11    [DISCONTINUED] budesonide-formoterol 80-4.5 mcg  (SYMBICORT) 80-4.5 mcg/actuation HFAA Inhale 2 puffs into the lungs 2 (two) times a day. Controller 10.2 g 11    [DISCONTINUED] predniSONE (DELTASONE) 20 MG tablet Take 1 tablet (20 mg total) by mouth once daily. 10 tablet 0    albuterol (PROVENTIL/VENTOLIN HFA) 90 mcg/actuation inhaler Inhale 2 puffs into the lungs every 4 (four) hours as needed for Shortness of Breath. Rescue 18 g 0    budesonide-formoterol 80-4.5 mcg (SYMBICORT) 80-4.5 mcg/actuation HFAA Inhale 2 puffs into the lungs every 12 (twelve) hours. May also inhale 1 puff every 4 (four) hours as needed (cough and shortness of breath). 2 puffs twice daily and as needed up to additional 8 puffs per day. 10.2 g 11    loratadine (CLARITIN) 10 mg tablet Take 1 tablet (10 mg total) by mouth once daily. 15 tablet 0    montelukast (SINGULAIR) 10 mg tablet Take 1 tablet (10 mg total) by mouth every evening. 90 tablet 4    pantoprazole (PROTONIX) 40 MG tablet Take 1 tablet (40 mg total) by mouth once daily. 30 tablet 2    predniSONE (DELTASONE) 10 MG tablet Take 30 mg (3 tablets) daily for 3 days; 20 mg (2 tablet) daily for 3 days; 10 mg (1 tablet) daily for 3 days. 18 tablet 0     No facility-administered encounter medications on file as of 11/16/2022.     Orders Placed This Encounter   Procedures    (In Office Administered) Pneumococcal Conjugate Vaccine (20 Valent) (IM)    CBC auto differential     Standing Status:   Future     Standing Expiration Date:   1/15/2024    IGE     Standing Status:   Future     Standing Expiration Date:   1/15/2024         Plan:             Problem List Items Addressed This Visit          Pulmonary    Chronic obstructive pulmonary disease    Overview     Group D. Post bronchodilator FEV1/FVC ratio of 67, consistent with COPD. FEV1 of 63.7, moderate degree of airflow obstruction. Did have partial bronchodilator response with 200 mL improvement in FEV1. Clinical symptoms could be suggestive of some component of underlying asthma.          Current Assessment & Plan     - patient using Symbicort 80-4.5 2 puffs only in the morning. Recommended to use 2 puffs twice a day per recommended dosing. Additionally, trial of MART therapy with as needed use of Budesonide-Formoterol in between maintenance dosing, no more than 8 PRN puffs  - instructed not to use Ventolin if using Symbicort as needed therapy  - Continue Spiriva 2 inh daily  - PRN nebulizer as needed  - start LTRA, Montelukast 10 mg in the evenings. Discussed black box warning of possible serious neuropsychiatric events and instructed wife to monitor for unusual behaviors  - continue nasal sprays as needed  - check CBC auto diff and IgE levels         Relevant Medications    budesonide-formoterol 80-4.5 mcg (SYMBICORT) 80-4.5 mcg/actuation HFAA    Other Relevant Orders    CBC auto differential    IGE    (In Office Administered) Pneumococcal Conjugate Vaccine (20 Valent) (IM)    Screening for lung cancer    Overview     30+ pack year smoking history  CT CHEST 3/30/22 with subcentimeter nodules  Annual CT chest            GI    Gastroesophageal reflux disease    Overview     Nocturnal cough, does improve with raising head of bed. Could have some degree of acid reflux         Current Assessment & Plan     - trial of PPI daily  - raise head of bed to 30 degrees            Other    Cigarette smoker    Overview     - est'd with smoking cessation program  - on Wellbutrin, does not use patch          Other Visit Diagnoses       Shortness of breath    -  Primary    Relevant Orders    CBC auto differential    IGE             Immunization:  Patient needs influenza annual vaccination. Instructed to get this once he is getting better from acute illness  PCV-20 ordered    Follow up 3 months      50 minutes of total time spent on the encounter, which includes face to face time and non-face to face time preparing to see the patient (eg, review of tests), Obtaining and/or reviewing separately obtained history,  Documenting clinical information in the electronic or other health record, Independently interpreting results (not separately reported) and communicating results to the patient/family/caregiver, or Care coordination (not separately reported).

## 2022-11-16 NOTE — ASSESSMENT & PLAN NOTE
- patient using Symbicort 80-4.5 2 puffs only in the morning. Recommended to use 2 puffs twice a day per recommended dosing. Additionally, trial of MART therapy with as needed use of Budesonide-Formoterol in between maintenance dosing, no more than 8 PRN puffs  - instructed not to use Ventolin if using Symbicort as needed therapy  - Continue Spiriva 2 inh daily  - PRN nebulizer as needed  - start LTRA, Montelukast 10 mg in the evenings. Discussed black box warning of possible serious neuropsychiatric events and instructed wife to monitor for unusual behaviors  - continue nasal sprays as needed  - check CBC auto diff and IgE levels

## 2022-12-19 ENCOUNTER — PATIENT MESSAGE (OUTPATIENT)
Dept: PULMONOLOGY | Facility: CLINIC | Age: 52
End: 2022-12-19
Payer: COMMERCIAL

## 2022-12-19 NOTE — TELEPHONE ENCOUNTER
Care Due:                  Date            Visit Type   Department     Provider  --------------------------------------------------------------------------------                                EP -                              PRIMARY      [facility]  Last Visit: 03-      CARE ([facility])   MEDICINE       Samantha Moralez  Next Visit: None Scheduled  None         None Found                                                            Last  Test          Frequency    Reason                     Performed    Due Date  --------------------------------------------------------------------------------    Office Visit  12 months..  albuterol, azelastine,     03- 03-                             buPROPion, loratadine....    [employer] Embedded Care Gaps. Reference number: [id number]. 12/19/2022   4:50:58 PM CST

## 2022-12-20 RX ORDER — ALBUTEROL SULFATE 90 UG/1
2 AEROSOL, METERED RESPIRATORY (INHALATION) EVERY 6 HOURS PRN
Qty: 54 G | Refills: 0 | Status: SHIPPED | OUTPATIENT
Start: 2022-12-20

## 2022-12-20 NOTE — TELEPHONE ENCOUNTER
Refill Decision Note   Arnie Barrientos  is requesting a refill authorization.  Brief Assessment and Rationale for Refill:  Approve    -Medication-Related Problems Identified: Requires appointment  Medication Therapy Plan:       Medication Reconciliation Completed: No   Comments:     Provider Staff:     Action is required for this patient.   Please see care gap opportunities below in Care Due Message.     Thanks!  [facility]     Appointments      Date Provider   Last Visit   3/22/2022 Samantha Moralez MD   Next Visit   Visit date not found Samantha Moralez MD     Note composed:1:16 PM 12/20/2022           Note composed:1:16 PM 12/20/2022

## 2022-12-29 ENCOUNTER — LAB VISIT (OUTPATIENT)
Dept: LAB | Facility: HOSPITAL | Age: 52
End: 2022-12-29
Attending: INTERNAL MEDICINE
Payer: COMMERCIAL

## 2022-12-29 DIAGNOSIS — R06.02 SHORTNESS OF BREATH: ICD-10-CM

## 2022-12-29 DIAGNOSIS — J44.9 CHRONIC OBSTRUCTIVE PULMONARY DISEASE, UNSPECIFIED COPD TYPE: ICD-10-CM

## 2022-12-29 DIAGNOSIS — J40 BRONCHITIS: ICD-10-CM

## 2022-12-29 LAB
BASOPHILS # BLD AUTO: 0.05 K/UL (ref 0–0.2)
BASOPHILS NFR BLD: 0.6 % (ref 0–1.9)
DIFFERENTIAL METHOD: ABNORMAL
EOSINOPHIL # BLD AUTO: 0.2 K/UL (ref 0–0.5)
EOSINOPHIL NFR BLD: 1.9 % (ref 0–8)
ERYTHROCYTE [DISTWIDTH] IN BLOOD BY AUTOMATED COUNT: 14.6 % (ref 11.5–14.5)
HCT VFR BLD AUTO: 44 % (ref 40–54)
HGB BLD-MCNC: 14.1 G/DL (ref 14–18)
IGE SERPL-ACNC: 1152 IU/ML (ref 0–100)
IMM GRANULOCYTES # BLD AUTO: 0.04 K/UL (ref 0–0.04)
IMM GRANULOCYTES NFR BLD AUTO: 0.5 % (ref 0–0.5)
LYMPHOCYTES # BLD AUTO: 1.7 K/UL (ref 1–4.8)
LYMPHOCYTES NFR BLD: 21.4 % (ref 18–48)
MCH RBC QN AUTO: 27.5 PG (ref 27–31)
MCHC RBC AUTO-ENTMCNC: 32 G/DL (ref 32–36)
MCV RBC AUTO: 86 FL (ref 82–98)
MONOCYTES # BLD AUTO: 0.8 K/UL (ref 0.3–1)
MONOCYTES NFR BLD: 9.8 % (ref 4–15)
NEUTROPHILS # BLD AUTO: 5.3 K/UL (ref 1.8–7.7)
NEUTROPHILS NFR BLD: 65.8 % (ref 38–73)
NRBC BLD-RTO: 0 /100 WBC
PLATELET # BLD AUTO: 200 K/UL (ref 150–450)
PMV BLD AUTO: 9.5 FL (ref 9.2–12.9)
RBC # BLD AUTO: 5.13 M/UL (ref 4.6–6.2)
WBC # BLD AUTO: 8.08 K/UL (ref 3.9–12.7)

## 2022-12-29 PROCEDURE — 82785 ASSAY OF IGE: CPT | Performed by: INTERNAL MEDICINE

## 2022-12-29 PROCEDURE — 36415 COLL VENOUS BLD VENIPUNCTURE: CPT | Performed by: INTERNAL MEDICINE

## 2022-12-29 PROCEDURE — 85025 COMPLETE CBC W/AUTO DIFF WBC: CPT | Performed by: INTERNAL MEDICINE

## 2022-12-30 ENCOUNTER — PATIENT MESSAGE (OUTPATIENT)
Dept: PULMONOLOGY | Facility: CLINIC | Age: 52
End: 2022-12-30
Payer: COMMERCIAL

## 2022-12-30 DIAGNOSIS — J44.89 ASTHMA-COPD OVERLAP SYNDROME: Primary | ICD-10-CM

## 2023-04-11 ENCOUNTER — PATIENT MESSAGE (OUTPATIENT)
Dept: ADMINISTRATIVE | Facility: HOSPITAL | Age: 53
End: 2023-04-11
Payer: COMMERCIAL

## 2023-04-11 ENCOUNTER — TELEPHONE (OUTPATIENT)
Dept: ALLERGY | Facility: CLINIC | Age: 53
End: 2023-04-11
Payer: COMMERCIAL

## 2023-04-27 ENCOUNTER — TELEPHONE (OUTPATIENT)
Dept: ALLERGY | Facility: CLINIC | Age: 53
End: 2023-04-27
Payer: COMMERCIAL

## 2023-04-27 NOTE — TELEPHONE ENCOUNTER
Called patient to assist with rescheduling appointment due to provider being out that day. Patient didn't answer so message was left on machine letting patient know the reason for reschedule and the date and time of appointment and patient was told that if that time didn't work for him to call office for further assistance. Patient is now scheduled for 05/09/23 @ 10am

## 2023-05-03 ENCOUNTER — TELEPHONE (OUTPATIENT)
Dept: SMOKING CESSATION | Facility: CLINIC | Age: 53
End: 2023-05-03
Payer: COMMERCIAL

## 2023-05-23 ENCOUNTER — LAB VISIT (OUTPATIENT)
Dept: LAB | Facility: HOSPITAL | Age: 53
End: 2023-05-23
Payer: COMMERCIAL

## 2023-05-23 ENCOUNTER — OFFICE VISIT (OUTPATIENT)
Dept: ALLERGY | Facility: CLINIC | Age: 53
End: 2023-05-23
Payer: COMMERCIAL

## 2023-05-23 VITALS — HEIGHT: 69 IN | BODY MASS INDEX: 26 KG/M2 | WEIGHT: 175.5 LBS

## 2023-05-23 DIAGNOSIS — J44.9 CHRONIC OBSTRUCTIVE PULMONARY DISEASE, UNSPECIFIED COPD TYPE: ICD-10-CM

## 2023-05-23 DIAGNOSIS — J44.9 CHRONIC OBSTRUCTIVE PULMONARY DISEASE, UNSPECIFIED COPD TYPE: Primary | ICD-10-CM

## 2023-05-23 DIAGNOSIS — J31.0 CHRONIC RHINITIS: ICD-10-CM

## 2023-05-23 PROCEDURE — 99204 OFFICE O/P NEW MOD 45 MIN: CPT | Mod: S$GLB,,, | Performed by: ALLERGY & IMMUNOLOGY

## 2023-05-23 PROCEDURE — 1159F MED LIST DOCD IN RCRD: CPT | Mod: CPTII,S$GLB,, | Performed by: ALLERGY & IMMUNOLOGY

## 2023-05-23 PROCEDURE — 86003 ALLG SPEC IGE CRUDE XTRC EA: CPT | Mod: 59 | Performed by: ALLERGY & IMMUNOLOGY

## 2023-05-23 PROCEDURE — 3008F BODY MASS INDEX DOCD: CPT | Mod: CPTII,S$GLB,, | Performed by: ALLERGY & IMMUNOLOGY

## 2023-05-23 PROCEDURE — 1160F RVW MEDS BY RX/DR IN RCRD: CPT | Mod: CPTII,S$GLB,, | Performed by: ALLERGY & IMMUNOLOGY

## 2023-05-23 PROCEDURE — 3008F PR BODY MASS INDEX (BMI) DOCUMENTED: ICD-10-PCS | Mod: CPTII,S$GLB,, | Performed by: ALLERGY & IMMUNOLOGY

## 2023-05-23 PROCEDURE — 99204 PR OFFICE/OUTPT VISIT, NEW, LEVL IV, 45-59 MIN: ICD-10-PCS | Mod: S$GLB,,, | Performed by: ALLERGY & IMMUNOLOGY

## 2023-05-23 PROCEDURE — 1159F PR MEDICATION LIST DOCUMENTED IN MEDICAL RECORD: ICD-10-PCS | Mod: CPTII,S$GLB,, | Performed by: ALLERGY & IMMUNOLOGY

## 2023-05-23 PROCEDURE — 1160F PR REVIEW ALL MEDS BY PRESCRIBER/CLIN PHARMACIST DOCUMENTED: ICD-10-PCS | Mod: CPTII,S$GLB,, | Performed by: ALLERGY & IMMUNOLOGY

## 2023-05-23 PROCEDURE — 99999 PR PBB SHADOW E&M-EST. PATIENT-LVL III: CPT | Mod: PBBFAC,,, | Performed by: ALLERGY & IMMUNOLOGY

## 2023-05-23 PROCEDURE — 99999 PR PBB SHADOW E&M-EST. PATIENT-LVL III: ICD-10-PCS | Mod: PBBFAC,,, | Performed by: ALLERGY & IMMUNOLOGY

## 2023-05-23 PROCEDURE — 36415 COLL VENOUS BLD VENIPUNCTURE: CPT | Mod: PO | Performed by: ALLERGY & IMMUNOLOGY

## 2023-05-23 PROCEDURE — 86003 ALLG SPEC IGE CRUDE XTRC EA: CPT | Performed by: ALLERGY & IMMUNOLOGY

## 2023-05-23 RX ORDER — MONTELUKAST SODIUM 10 MG/1
10 TABLET ORAL NIGHTLY
COMMUNITY

## 2023-05-23 NOTE — PROGRESS NOTES
"Subjective:       Patient ID: Arnie Barrientos III is a 53 y.o. male.    Chief Complaint:  Allergies (Runny nose, sneezing ) and Other (SOB- " hard to get a good breath in " patient notices this more when he is around grass )      54 yo man presents for consult from Dr Yinka Mcdonald and Dr Samantha Moralez for elevated IgE. He is seeing pulm for COPD. Has been having breathing issues for past 1.5-2 years. He is taking Spiriva 1 puff BID. Is on Symbicort 80 and per wife is taking BID and told to use as rescue and dana albuterol less which is what he is doing right now. He finds he is much better than was a year ago. 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 Not much cough. Also has clear runny nose, pouring from nose.  He had PFT in 5/2022 with FEV1 2.4 (63.7%) and post 2.6 for 8% change, FVC 3.62 (75.5%) and post 3.85 for 6.7% change and FEV1/FVC 66 and post 67. This was moderate obstruction with minimal improvement. He recently in dec had labs with IgE 1152 and CBC with 200-300 eos so was referred for allergy eval of symptoms. He does have runny nose, slight stuffy nose and some sneeze, very minimal itchy nose. No eye issues. No other triggers then outside and grass. Seems worse at start of summer. He is not on any nasal sprays Or allergy pills at this time. No H/O eczema. No food, insect or latex allergy. No other medical issues. No ENT surgery.        Environmental History: see history section for home environment  Review of Systems   Constitutional:  Negative for activity change, appetite change, chills, fatigue, fever and unexpected weight change.   HENT:  Positive for congestion, postnasal drip, rhinorrhea and sneezing. Negative for ear discharge, ear pain, facial swelling, hearing loss, mouth sores, nosebleeds, sinus pressure, sore throat, tinnitus, trouble swallowing and voice change.    Eyes:  Negative for discharge, redness, itching and " visual disturbance.   Respiratory:  Positive for cough, chest tightness, shortness of breath and wheezing.    Cardiovascular:  Negative for chest pain, palpitations and leg swelling.   Gastrointestinal:  Negative for abdominal distention, abdominal pain, constipation, diarrhea, nausea and vomiting.   Genitourinary:  Negative for difficulty urinating.   Musculoskeletal:  Negative for arthralgias, back pain, joint swelling and myalgias.   Skin:  Negative for color change, pallor and rash.   Neurological:  Negative for dizziness, tremors, speech difficulty, weakness, light-headedness and headaches.   Hematological:  Negative for adenopathy. Does not bruise/bleed easily.   Psychiatric/Behavioral:  Negative for agitation, confusion, decreased concentration and sleep disturbance. The patient is not nervous/anxious.       Objective:      Physical Exam  Vitals and nursing note reviewed.   Constitutional:       General: He is not in acute distress.     Appearance: Normal appearance. He is not ill-appearing.   HENT:      Nose: No rhinorrhea.   Eyes:      General:         Right eye: No discharge.         Left eye: No discharge.      Conjunctiva/sclera: Conjunctivae normal.   Pulmonary:      Effort: Pulmonary effort is normal. No respiratory distress.   Abdominal:      General: There is no distension.   Skin:     General: Skin is warm and dry.      Findings: No erythema or rash.   Neurological:      Mental Status: He is alert and oriented to person, place, and time.   Psychiatric:         Mood and Affect: Mood normal.         Behavior: Behavior normal.         Thought Content: Thought content normal.         Judgment: Judgment normal.       Laboratory:   none performed   Assessment:       1. Chronic obstructive pulmonary disease, unspecified COPD type    2. Chronic rhinitis         Plan:       Immunocaps to see if any allergic triggers of SOB  Continue Symbicort but advised to use 2 puff BID and not as rescue, may consider  increasing to 160 dose  Continue Spiriva BID  Continue albuterol 2 puff every 4 hours as needed  If allergic trial H 1 blocker and montelukast   Phone review  Naheed Mcdonald and Naomy notified of completed consult via [employer]

## 2023-05-26 LAB
A ALTERNATA IGE QN: <0.1 KU/L
A FUMIGATUS IGE QN: <0.1 KU/L
ALLERGEN CHAETOMIUM GLOBOSUM IGE: <0.1 KU/L
ALLERGEN WALNUT TREE IGE: 0.75 KU/L
ALLERGEN WHITE PINE TREE IGE: 0.28 KU/L
BAHIA GRASS IGE QN: 71.7 KU/L
BALD CYPRESS IGE QN: 0.61 KU/L
BERMUDA GRASS IGE QN: 32.1 KU/L
C HERBARUM IGE QN: <0.1 KU/L
C LUNATA IGE QN: <0.1 KU/L
CAT DANDER IGE QN: 0.2 KU/L
CHAETOMIUM GLOB. CLASS: NORMAL
COMMON RAGWEED IGE QN: 0.65 KU/L
COTTONWOOD IGE QN: 0.97 KU/L
D FARINAE IGE QN: 0.61 KU/L
D PTERONYSS IGE QN: 0.42 KU/L
DEPRECATED A ALTERNATA IGE RAST QL: NORMAL
DEPRECATED A FUMIGATUS IGE RAST QL: NORMAL
DEPRECATED BAHIA GRASS IGE RAST QL: ABNORMAL
DEPRECATED BALD CYPRESS IGE RAST QL: ABNORMAL
DEPRECATED BERMUDA GRASS IGE RAST QL: ABNORMAL
DEPRECATED C HERBARUM IGE RAST QL: NORMAL
DEPRECATED C LUNATA IGE RAST QL: NORMAL
DEPRECATED CAT DANDER IGE RAST QL: ABNORMAL
DEPRECATED COMMON RAGWEED IGE RAST QL: ABNORMAL
DEPRECATED COTTONWOOD IGE RAST QL: ABNORMAL
DEPRECATED D FARINAE IGE RAST QL: ABNORMAL
DEPRECATED D PTERONYSS IGE RAST QL: ABNORMAL
DEPRECATED DOG DANDER IGE RAST QL: ABNORMAL
DEPRECATED ELDER IGE RAST QL: ABNORMAL
DEPRECATED ENGL PLANTAIN IGE RAST QL: ABNORMAL
DEPRECATED HORSE DANDER IGE RAST QL: NORMAL
DEPRECATED JOHNSON GRASS IGE RAST QL: ABNORMAL
DEPRECATED LONDON PLANE IGE RAST QL: ABNORMAL
DEPRECATED MUGWORT IGE RAST QL: ABNORMAL
DEPRECATED P NOTATUM IGE RAST QL: NORMAL
DEPRECATED PECAN/HICK TREE IGE RAST QL: ABNORMAL
DEPRECATED ROACH IGE RAST QL: ABNORMAL
DEPRECATED S ROSTRATA IGE RAST QL: NORMAL
DEPRECATED SALTWORT IGE RAST QL: ABNORMAL
DEPRECATED SILVER BIRCH IGE RAST QL: ABNORMAL
DEPRECATED TIMOTHY IGE RAST QL: ABNORMAL
DEPRECATED WEST RAGWEED IGE RAST QL: ABNORMAL
DEPRECATED WHITE OAK IGE RAST QL: ABNORMAL
DEPRECATED WILLOW IGE RAST QL: ABNORMAL
DOG DANDER IGE QN: 0.62 KU/L
ELDER IGE QN: 0.47 KU/L
ENGL PLANTAIN IGE QN: 1.46 KU/L
HORSE DANDER IGE QN: <0.1 KU/L
JOHNSON GRASS IGE QN: 40.6 KU/L
LONDON PLANE IGE QN: 0.64 KU/L
MUGWORT IGE QN: 0.35 KU/L
P NOTATUM IGE QN: <0.1 KU/L
PECAN/HICK TREE IGE QN: 0.56 KU/L
ROACH IGE QN: 0.46 KU/L
S ROSTRATA IGE QN: <0.1 KU/L
SALTWORT IGE QN: 0.49 KU/L
SILVER BIRCH IGE QN: 0.49 KU/L
TIMOTHY IGE QN: 77.6 KU/L
WALNUT TREE CLASS: ABNORMAL
WEST RAGWEED IGE QN: 0.48 KU/L
WHITE OAK IGE QN: 0.71 KU/L
WHITE PINE CLASS: ABNORMAL
WILLOW IGE QN: 0.92 KU/L

## 2023-05-29 ENCOUNTER — TELEPHONE (OUTPATIENT)
Dept: SMOKING CESSATION | Facility: CLINIC | Age: 53
End: 2023-05-29
Payer: COMMERCIAL

## 2023-05-30 ENCOUNTER — PATIENT MESSAGE (OUTPATIENT)
Dept: ALLERGY | Facility: CLINIC | Age: 53
End: 2023-05-30
Payer: COMMERCIAL

## 2023-09-07 DIAGNOSIS — J44.9 CHRONIC OBSTRUCTIVE PULMONARY DISEASE, UNSPECIFIED COPD TYPE: ICD-10-CM

## 2023-09-07 RX ORDER — TIOTROPIUM BROMIDE INHALATION SPRAY 1.56 UG/1
2.5 SPRAY, METERED RESPIRATORY (INHALATION) DAILY
Qty: 4 G | Refills: 11 | Status: SHIPPED | OUTPATIENT
Start: 2023-09-07

## 2023-09-23 ENCOUNTER — OFFICE VISIT (OUTPATIENT)
Dept: URGENT CARE | Facility: CLINIC | Age: 53
End: 2023-09-23
Payer: COMMERCIAL

## 2023-09-23 VITALS
BODY MASS INDEX: 25.92 KG/M2 | RESPIRATION RATE: 18 BRPM | DIASTOLIC BLOOD PRESSURE: 78 MMHG | TEMPERATURE: 98 F | HEART RATE: 80 BPM | HEIGHT: 69 IN | OXYGEN SATURATION: 98 % | WEIGHT: 175 LBS | SYSTOLIC BLOOD PRESSURE: 116 MMHG

## 2023-09-23 DIAGNOSIS — M10.9 ACUTE GOUT INVOLVING TOE OF RIGHT FOOT, UNSPECIFIED CAUSE: Primary | ICD-10-CM

## 2023-09-23 PROCEDURE — 96372 PR INJECTION,THERAP/PROPH/DIAG2ST, IM OR SUBCUT: ICD-10-PCS | Mod: S$GLB,,, | Performed by: NURSE PRACTITIONER

## 2023-09-23 PROCEDURE — 96372 THER/PROPH/DIAG INJ SC/IM: CPT | Mod: S$GLB,,, | Performed by: NURSE PRACTITIONER

## 2023-09-23 PROCEDURE — 99203 OFFICE O/P NEW LOW 30 MIN: CPT | Mod: 25,S$GLB,, | Performed by: NURSE PRACTITIONER

## 2023-09-23 PROCEDURE — 99203 PR OFFICE/OUTPT VISIT, NEW, LEVL III, 30-44 MIN: ICD-10-PCS | Mod: 25,S$GLB,, | Performed by: NURSE PRACTITIONER

## 2023-09-23 RX ORDER — IBUPROFEN 800 MG/1
800 TABLET ORAL 3 TIMES DAILY
Qty: 30 TABLET | Refills: 0 | Status: SHIPPED | OUTPATIENT
Start: 2023-09-23 | End: 2023-10-03

## 2023-09-23 RX ORDER — DEXAMETHASONE SODIUM PHOSPHATE 100 MG/10ML
8 INJECTION INTRAMUSCULAR; INTRAVENOUS
Status: COMPLETED | OUTPATIENT
Start: 2023-09-23 | End: 2023-09-23

## 2023-09-23 RX ORDER — INDOMETHACIN 50 MG/1
50 CAPSULE ORAL 3 TIMES DAILY
Qty: 15 CAPSULE | Refills: 0 | Status: SHIPPED | OUTPATIENT
Start: 2023-09-23 | End: 2023-09-28

## 2023-09-23 RX ADMIN — DEXAMETHASONE SODIUM PHOSPHATE 8 MG: 100 INJECTION INTRAMUSCULAR; INTRAVENOUS at 03:09

## 2023-09-23 NOTE — PROGRESS NOTES
"Subjective:      Patient ID: Arnie Barrientos III is a 53 y.o. male.    Vitals:  height is 5' 9" (1.753 m) and weight is 79.4 kg (175 lb). His oral temperature is 98 °F (36.7 °C). His blood pressure is 116/78 and his pulse is 80. His respiration is 18 and oxygen saturation is 98%.     Chief Complaint: Other Misc (toe pain - Entered by patient) and Toe Pain    54yo male pt reports pain and redness and warmth to R great toe that started 3 days ago and has been worsening since onset.  Reports that last night the pain was much worse and that the bedsheets were causing pain.  Denies HX of similar symptoms or gout, but states that he suspects gout.  Denies numbness or tingling to foot/toes.  Reports that pain is worse with wt-bearing and with palpation.    Toe Pain   The incident occurred 3 to 5 days ago. The incident occurred at home. The injury mechanism is unknown. The pain is present in the right toes. The quality of the pain is described as aching and shooting. The pain is at a severity of 4/10. The pain is mild. The pain has been Constant since onset. Associated symptoms include an inability to bear weight. Pertinent negatives include no loss of motion, loss of sensation, muscle weakness, numbness or tingling. He reports no foreign bodies present. The symptoms are aggravated by movement, weight bearing and palpation. He has tried ice and acetaminophen for the symptoms. The treatment provided no relief.       Constitution: Negative for chills and fever.   Musculoskeletal:  Positive for pain, joint pain and joint swelling. Negative for trauma and abnormal ROM of joint.   Skin:  Positive for erythema. Negative for wound, abrasion, laceration and bruising.   Neurological:  Negative for numbness and tingling.      Objective:     Physical Exam   Constitutional: He is oriented to person, place, and time. He appears well-developed.   HENT:   Head: Normocephalic and atraumatic. Head is without abrasion, without contusion " and without laceration.   Ears:   Right Ear: External ear normal.   Left Ear: External ear normal.   Nose: Nose normal.   Mouth/Throat: Oropharynx is clear and moist and mucous membranes are normal.   Eyes: Conjunctivae, EOM and lids are normal. Pupils are equal, round, and reactive to light.   Neck: Trachea normal and phonation normal. Neck supple.   Cardiovascular: Normal rate, regular rhythm and normal heart sounds.   Pulmonary/Chest: Effort normal and breath sounds normal. No stridor. No respiratory distress.   Musculoskeletal: Normal range of motion.         General: Normal range of motion.      Right ankle: Normal. He exhibits normal range of motion, no swelling, no ecchymosis, no deformity, no laceration and normal pulse. No tenderness. Achilles tendon normal. Achilles tendon exhibits no defect and normal Mendiola's test results.      Left ankle: Normal.      Right foot: Normal range of motion and normal capillary refill. Tenderness (to MCP joint of great toe) and swelling (mild, to MCP joint of great toe, with erythema and mild warmth) present. No bony tenderness, crepitus or deformity.      Left foot: Normal.   Neurological: He is alert and oriented to person, place, and time.   Skin: Skin is warm, dry, intact and no rash. Capillary refill takes less than 2 seconds. not right ankleerythema No abrasion, No burn, No bruising and No ecchymosis   Psychiatric: His speech is normal and behavior is normal. Judgment and thought content normal.   Nursing note and vitals reviewed.      Assessment:     1. Acute gout involving toe of right foot, unspecified cause        Plan:     Provided education on prescribed medications.  Recommended rest, alternating cool/warm compresses, elevation of limb when at rest, and gentle stretching.  Recommended trying indomethacin for pain, and provided refill of ibuprofen 800mg if indomethacin not effective.  Recommended appt with PCP for follow-up for uric acid level.  Provided  education on return/ER precautions.  Pt verbalized understanding and agreed to plan.      Acute gout involving toe of right foot, unspecified cause  -     dexAMETHasone injection 8 mg  -     ibuprofen (ADVIL,MOTRIN) 800 MG tablet; Take 1 tablet (800 mg total) by mouth 3 (three) times daily. for 10 days  Dispense: 30 tablet; Refill: 0  -     indomethacin (INDOCIN) 50 MG capsule; Take 1 capsule (50 mg total) by mouth 3 (three) times daily. for 5 days  Dispense: 15 capsule; Refill: 0      Patient Instructions   If your condition worsens or fails to improve, we recommend that you receive another evaluation at the ER immediately, contact your PCP to discuss your concerns, or return here.  You must understand that you've received an urgent care treatment only, and that you may be released before all your medical problems are known or treated.  You, the patient, will arrange for follow-up care as instructed.     If you were prescribed a narcotic or muscle relaxant, do not drive or operate heavy machinery while taking these medication.    Tylenol or Ibuprofen can also be used as directed for pain unless you have an allergy to them or medical condition (such as stomach ulcers, kidney or liver disease, or use blood thinners, etc.) for which you should not be taking these type of medications.     If you were given a prescription NSAID here, do not also take any over the counter NSAIDs like Ibuprofen, Aleve, Advil, Motrin, etc.  RICE (rest, ice, compression, and elevation) are helpful, as well as gentle stretching, unless otherwise directed.     If you have low back pain and develop bowel or bladder symptoms or increased pain going down your legs, go to the ER immediately.     If you were given a splint, wear it at all times.  If you were given crutches, use them as instructed.  Do not rest your armpits on the foam pad, or you risk compressing the nerves and the vessels there.

## 2023-12-28 ENCOUNTER — LAB VISIT (OUTPATIENT)
Dept: LAB | Facility: HOSPITAL | Age: 53
End: 2023-12-28
Payer: COMMERCIAL

## 2023-12-28 ENCOUNTER — TELEPHONE (OUTPATIENT)
Dept: PRIMARY CARE CLINIC | Facility: CLINIC | Age: 53
End: 2023-12-28

## 2023-12-28 ENCOUNTER — OFFICE VISIT (OUTPATIENT)
Dept: PRIMARY CARE CLINIC | Facility: CLINIC | Age: 53
End: 2023-12-28
Payer: COMMERCIAL

## 2023-12-28 VITALS
DIASTOLIC BLOOD PRESSURE: 74 MMHG | RESPIRATION RATE: 18 BRPM | SYSTOLIC BLOOD PRESSURE: 122 MMHG | OXYGEN SATURATION: 98 % | HEIGHT: 69 IN | BODY MASS INDEX: 25.86 KG/M2 | TEMPERATURE: 99 F | WEIGHT: 174.63 LBS | HEART RATE: 78 BPM

## 2023-12-28 DIAGNOSIS — M10.9 GOUT INVOLVING TOE OF LEFT FOOT, UNSPECIFIED CAUSE, UNSPECIFIED CHRONICITY: ICD-10-CM

## 2023-12-28 DIAGNOSIS — M10.9 GOUT INVOLVING TOE OF LEFT FOOT, UNSPECIFIED CAUSE, UNSPECIFIED CHRONICITY: Primary | ICD-10-CM

## 2023-12-28 PROBLEM — K21.9 GASTROESOPHAGEAL REFLUX DISEASE: Status: RESOLVED | Noted: 2022-11-16 | Resolved: 2023-12-28

## 2023-12-28 LAB
ALBUMIN SERPL BCP-MCNC: 4.2 G/DL (ref 3.5–5.2)
ALP SERPL-CCNC: 74 U/L (ref 55–135)
ALT SERPL W/O P-5'-P-CCNC: 26 U/L (ref 10–44)
ANION GAP SERPL CALC-SCNC: 10 MMOL/L (ref 8–16)
AST SERPL-CCNC: 25 U/L (ref 10–40)
BASOPHILS # BLD AUTO: 0.05 K/UL (ref 0–0.2)
BASOPHILS NFR BLD: 0.5 % (ref 0–1.9)
BILIRUB SERPL-MCNC: 0.6 MG/DL (ref 0.1–1)
BILIRUB UR QL STRIP: NEGATIVE
BUN SERPL-MCNC: 13 MG/DL (ref 6–20)
CALCIUM SERPL-MCNC: 9.3 MG/DL (ref 8.7–10.5)
CHLORIDE SERPL-SCNC: 106 MMOL/L (ref 95–110)
CLARITY UR REFRACT.AUTO: CLEAR
CO2 SERPL-SCNC: 26 MMOL/L (ref 23–29)
COLOR UR AUTO: YELLOW
CREAT SERPL-MCNC: 0.9 MG/DL (ref 0.5–1.4)
DIFFERENTIAL METHOD BLD: ABNORMAL
EOSINOPHIL # BLD AUTO: 0.1 K/UL (ref 0–0.5)
EOSINOPHIL NFR BLD: 1 % (ref 0–8)
ERYTHROCYTE [DISTWIDTH] IN BLOOD BY AUTOMATED COUNT: 14.1 % (ref 11.5–14.5)
ERYTHROCYTE [SEDIMENTATION RATE] IN BLOOD BY PHOTOMETRIC METHOD: <2 MM/HR (ref 0–23)
EST. GFR  (NO RACE VARIABLE): >60 ML/MIN/1.73 M^2
GLUCOSE SERPL-MCNC: 97 MG/DL (ref 70–110)
GLUCOSE UR QL STRIP: NEGATIVE
HCT VFR BLD AUTO: 45.8 % (ref 40–54)
HGB BLD-MCNC: 14.8 G/DL (ref 14–18)
HGB UR QL STRIP: NEGATIVE
IMM GRANULOCYTES # BLD AUTO: 0.07 K/UL (ref 0–0.04)
IMM GRANULOCYTES NFR BLD AUTO: 0.7 % (ref 0–0.5)
KETONES UR QL STRIP: NEGATIVE
LEUKOCYTE ESTERASE UR QL STRIP: NEGATIVE
LYMPHOCYTES # BLD AUTO: 2.3 K/UL (ref 1–4.8)
LYMPHOCYTES NFR BLD: 22 % (ref 18–48)
MCH RBC QN AUTO: 28.1 PG (ref 27–31)
MCHC RBC AUTO-ENTMCNC: 32.3 G/DL (ref 32–36)
MCV RBC AUTO: 87 FL (ref 82–98)
MONOCYTES # BLD AUTO: 0.8 K/UL (ref 0.3–1)
MONOCYTES NFR BLD: 7.7 % (ref 4–15)
NEUTROPHILS # BLD AUTO: 7.1 K/UL (ref 1.8–7.7)
NEUTROPHILS NFR BLD: 68.1 % (ref 38–73)
NITRITE UR QL STRIP: NEGATIVE
NRBC BLD-RTO: 0 /100 WBC
PH UR STRIP: 6 [PH] (ref 5–8)
PLATELET # BLD AUTO: 223 K/UL (ref 150–450)
PMV BLD AUTO: 10.1 FL (ref 9.2–12.9)
POTASSIUM SERPL-SCNC: 4.4 MMOL/L (ref 3.5–5.1)
PROT SERPL-MCNC: 7.3 G/DL (ref 6–8.4)
PROT UR QL STRIP: NEGATIVE
RBC # BLD AUTO: 5.26 M/UL (ref 4.6–6.2)
SODIUM SERPL-SCNC: 142 MMOL/L (ref 136–145)
SP GR UR STRIP: 1.01 (ref 1–1.03)
URATE SERPL-MCNC: 8.3 MG/DL (ref 3.4–7)
URN SPEC COLLECT METH UR: NORMAL
WBC # BLD AUTO: 10.4 K/UL (ref 3.9–12.7)

## 2023-12-28 PROCEDURE — 99499 NO LOS: ICD-10-PCS | Mod: S$GLB,,, | Performed by: INTERNAL MEDICINE

## 2023-12-28 PROCEDURE — 84550 ASSAY OF BLOOD/URIC ACID: CPT | Performed by: INTERNAL MEDICINE

## 2023-12-28 PROCEDURE — 99499 UNLISTED E&M SERVICE: CPT | Mod: S$GLB,,, | Performed by: INTERNAL MEDICINE

## 2023-12-28 PROCEDURE — 80053 COMPREHEN METABOLIC PANEL: CPT | Performed by: INTERNAL MEDICINE

## 2023-12-28 PROCEDURE — 85652 RBC SED RATE AUTOMATED: CPT | Performed by: INTERNAL MEDICINE

## 2023-12-28 PROCEDURE — 81003 URINALYSIS AUTO W/O SCOPE: CPT | Performed by: INTERNAL MEDICINE

## 2023-12-28 PROCEDURE — 36415 COLL VENOUS BLD VENIPUNCTURE: CPT | Performed by: INTERNAL MEDICINE

## 2023-12-28 PROCEDURE — 85025 COMPLETE CBC W/AUTO DIFF WBC: CPT | Performed by: INTERNAL MEDICINE

## 2023-12-28 RX ORDER — PREDNISONE 20 MG/1
20 TABLET ORAL DAILY
Qty: 10 TABLET | Refills: 0 | Status: SHIPPED | OUTPATIENT
Start: 2023-12-28 | End: 2024-01-07

## 2023-12-28 NOTE — PATIENT INSTRUCTIONS
Thank you for visiting today.  It is likely that you have had an attack of gout and I have done some laboratory to see if you have the elevated chemical in your blood, uric acid, which can be treated with a medicine to prevent it.  I have prescribed prednisone 20 mg for 10 days though you can stop it at 7 days.  Should you have an acute attack of gout and have no medication you can use Alleve 3 tablets with breakfast and dinner for approximately 5 days which would resolve it.  You can not take Alleve or indomethacin while taking the prednisone.  If you have pain my recommendation would be Tylenol arthritis 2 tablets every 6-8 hours.  You need to reduce your alcohol which may cause or precipitate a gout attack.  I recommend you also Google foods high in low in uric acid to see what you can or should not eat.    The problem with your ears is that you have Eustachian tube dysfunction which is similar to going up on an airplane in your ear feels blocked.  Zyrtec 10 mg at night will also be helpful when you have this to reduce the amount of fluid.

## 2023-12-28 NOTE — PROGRESS NOTES
53-year-old gentleman with a history of  COPD with a morning cough, history of environmental allergies, here with a chief complaint of left toe pain.    History of present illness and pertinent review of systems:  The patient believes he had his 1st attack 5 months ago.  This involved his right foot which he had been previously injured 4 years ago.  The patient states he had a 2nd attack of the right foot a proximally 6 weeks ago.  He was given indomethacin and a steroid shot which resolved his symptoms.  Today is now his 3rd attack and this time involves his left big toe.  The patient notes that his toe is red and swollen.  He was barely able to put on a shoe or sock.  The patient has no history of kidney stones but his father did have nephrolithiasis.  Though the patient has been told he has gout he has never had a uric acid level.  The the patient does not describe a diet that is high in purines.  The patient has 3-4 beers 4 times a week.  The patient notes he took indomethacin and it helped.  There was no history of trauma.    Problem list, medication list, and allergies were reviewed.  The patient is using only 1 of the inhalers prescribed but it was not clear which 1.  The patient has an unclear reaction to penicillin at age 9.      Review of systems: The patient notes that his right ear is clogged and he uses  a Q-tip.    Physical Exam  Vitals and nursing note reviewed.   Constitutional:       General: He is not in acute distress.     Appearance: Normal appearance. He is not ill-appearing, toxic-appearing or diaphoretic.   HENT:      Head: Atraumatic.      Right Ear: Tympanic membrane, ear canal and external ear normal.      Left Ear: Tympanic membrane, ear canal and external ear normal.      Ears:      Comments: Fluid noted behind both TMs     Nose: Nose normal. No congestion or rhinorrhea.      Mouth/Throat:      Mouth: Mucous membranes are moist.      Pharynx: Oropharynx is clear. No posterior  oropharyngeal erythema.   Eyes:      General: No scleral icterus.     Conjunctiva/sclera: Conjunctivae normal.   Cardiovascular:      Rate and Rhythm: Normal rate and regular rhythm.      Pulses: Normal pulses.      Heart sounds: Normal heart sounds. No murmur heard.     No gallop.   Pulmonary:      Effort: Pulmonary effort is normal. No respiratory distress.      Breath sounds: No wheezing, rhonchi or rales.      Comments: The patient has decreased breath sounds.  This has consistent with COPD.  His breath sounds are also bronchovesicular.  Abdominal:      General: Bowel sounds are normal.      Palpations: Abdomen is soft.      Tenderness: There is no abdominal tenderness.   Musculoskeletal:         General: Swelling and tenderness present.      Cervical back: Neck supple. No tenderness.      Right lower leg: No edema.      Left lower leg: No edema.      Comments: Left toe is consistent with the presence of gout but not diagnostic.   Lymphadenopathy:      Cervical: No cervical adenopathy.   Skin:     Coloration: Skin is not jaundiced or pale.   Neurological:      General: No focal deficit present.   Psychiatric:         Mood and Affect: Mood normal.         Behavior: Behavior normal.      Assessment/plan:   Gout:  The patient has are consistent but not diagnostic of gout.  The history is also consistent.  There was not enough swelling to do an aspiration for crystals however.    Plan: Explanation  Lab to include CBC, sed rate, CMP, and urinalysis looking for crystals and signs of uric acid.    The patient should reduce alcohol and I have suggested to him that he look in Google for foods that are low in uric acid and high in uric acid so he knows what to avoid and what to eat.  I will call the patient with the lab results once I see them.  If the patient has hyperuricemia he can be started on allopurinol after the gout attack resolves.  Prednisone 20 mg for 7-10 days.    Eustachian tube dysfunction:  The patient is  Eustachian tube dysfunction.  Though the history was suggestive of the possibility of impacted cerumen this was not found.  The patient has difficulty popping his ears but understands that he needs to do that.    Plan:  Explanation of the above   Prednisone should be helpful in reducing the fluid   Zyrtec 10 mg at bedtime

## 2023-12-28 NOTE — TELEPHONE ENCOUNTER
Reviewed lab and left message.  The patient's uric acid is high that would be consistent with gout.  His sed rate however is normal in his white count is only minimally elevated though there is a shift in immature cells.  Clinically this is consistent with gout.  My recommendation would be the patient begin allopurinol a proximally 1 week after his gout attack is resolved starting at 100 mg and increasing to achieve a goal of a uric acid less than 5.  The patient was also reminded to follow a low purine diet to also reduce further chance of a gout attack

## 2024-01-02 DIAGNOSIS — J40 BRONCHITIS: ICD-10-CM

## 2024-01-02 DIAGNOSIS — J44.9 CHRONIC OBSTRUCTIVE PULMONARY DISEASE, UNSPECIFIED COPD TYPE: ICD-10-CM

## 2024-01-04 ENCOUNTER — PATIENT MESSAGE (OUTPATIENT)
Dept: PULMONOLOGY | Facility: CLINIC | Age: 54
End: 2024-01-04
Payer: COMMERCIAL

## 2024-01-04 RX ORDER — BUDESONIDE AND FORMOTEROL FUMARATE DIHYDRATE 80; 4.5 UG/1; UG/1
AEROSOL RESPIRATORY (INHALATION)
Qty: 10.2 G | Refills: 11 | Status: SHIPPED | OUTPATIENT
Start: 2024-01-04 | End: 2025-01-03

## 2024-01-12 ENCOUNTER — HOSPITAL ENCOUNTER (OUTPATIENT)
Dept: RADIOLOGY | Facility: HOSPITAL | Age: 54
Discharge: HOME | End: 2024-01-12
Attending: INTERNAL MEDICINE
Payer: COMMERCIAL

## 2024-01-12 DIAGNOSIS — J44.9 CHRONIC OBSTRUCTIVE PULMONARY DISEASE, UNSPECIFIED COPD TYPE: ICD-10-CM

## 2024-01-12 DIAGNOSIS — F17.200 CURRENT SMOKER: ICD-10-CM

## 2024-01-12 PROCEDURE — 71271 CT THORAX LUNG CANCER SCR C-: CPT | Mod: 26,,, | Performed by: RADIOLOGY

## 2024-01-12 PROCEDURE — 71271 CT THORAX LUNG CANCER SCR C-: CPT | Mod: TC

## 2024-01-24 ENCOUNTER — TELEPHONE (OUTPATIENT)
Dept: PULMONOLOGY | Facility: CLINIC | Age: 54
End: 2024-01-24
Payer: COMMERCIAL

## 2024-01-24 NOTE — TELEPHONE ENCOUNTER
I called and left a voicemail for Mr Barrientos about his 1130am  appointment with Dr Mcdonald on 1-29-24.I asked him to call back if he cannot keep this visit. Nicole Frausto LPN

## 2024-01-30 ENCOUNTER — OFFICE VISIT (OUTPATIENT)
Dept: SLEEP MEDICINE | Facility: CLINIC | Age: 54
End: 2024-01-30
Payer: COMMERCIAL

## 2024-01-30 ENCOUNTER — PATIENT MESSAGE (OUTPATIENT)
Dept: PULMONOLOGY | Facility: CLINIC | Age: 54
End: 2024-01-30
Payer: COMMERCIAL

## 2024-01-30 VITALS
SYSTOLIC BLOOD PRESSURE: 125 MMHG | WEIGHT: 178.56 LBS | HEART RATE: 101 BPM | BODY MASS INDEX: 26.45 KG/M2 | DIASTOLIC BLOOD PRESSURE: 75 MMHG | HEIGHT: 69 IN | OXYGEN SATURATION: 96 %

## 2024-01-30 DIAGNOSIS — J44.89 ASTHMA-COPD OVERLAP SYNDROME: ICD-10-CM

## 2024-01-30 DIAGNOSIS — R06.83 SNORING: ICD-10-CM

## 2024-01-30 DIAGNOSIS — Z12.2 SCREENING FOR LUNG CANCER: ICD-10-CM

## 2024-01-30 DIAGNOSIS — F17.210 CIGARETTE SMOKER: Primary | ICD-10-CM

## 2024-01-30 PROCEDURE — 3074F SYST BP LT 130 MM HG: CPT | Mod: CPTII,S$GLB,, | Performed by: INTERNAL MEDICINE

## 2024-01-30 PROCEDURE — 1159F MED LIST DOCD IN RCRD: CPT | Mod: CPTII,S$GLB,, | Performed by: INTERNAL MEDICINE

## 2024-01-30 PROCEDURE — 3008F BODY MASS INDEX DOCD: CPT | Mod: CPTII,S$GLB,, | Performed by: INTERNAL MEDICINE

## 2024-01-30 PROCEDURE — 3078F DIAST BP <80 MM HG: CPT | Mod: CPTII,S$GLB,, | Performed by: INTERNAL MEDICINE

## 2024-01-30 PROCEDURE — 99999 PR PBB SHADOW E&M-EST. PATIENT-LVL III: CPT | Mod: PBBFAC,,, | Performed by: INTERNAL MEDICINE

## 2024-01-30 PROCEDURE — 99214 OFFICE O/P EST MOD 30 MIN: CPT | Mod: S$GLB,,, | Performed by: INTERNAL MEDICINE

## 2024-01-30 RX ORDER — BUPROPION HYDROCHLORIDE 150 MG/1
TABLET ORAL
Qty: 30 TABLET | Refills: 11 | Status: SHIPPED | OUTPATIENT
Start: 2024-01-30

## 2024-01-30 NOTE — PROGRESS NOTES
Subjective:    Established     Patient ID: Arnie Barrientos III is a 53 y.o. male.    Chief Complaint: Shortness of Breath      HPI   53 yo male smoker who is an established patient of pulmonary. He was last seen November 2022. He has asthma/copd overlap with reversible features on pft.    Today:  He uses Symbicort as needed. He uses it probably one puff per week. In the past year, he denies any exacerbations. He continues to work throughout the year as a . He was able to cut down on cigarettes, but he is back up to 1 PPD. He is out of his Wellbutrin.     Also reports snoring, but not witnessed apnea.    Of note, he reports left sided gout today, which recently acted up and responded to steroid course. He has another flare currently. He has leftover prednisone, which he is currently taking.    He had a CT chest Jan 12, 2024 - stable emphysema. No concerning lung nodules.    November 2022:  Seen last in May 2022. Since then, he had two episodes of acute dyspnea. Once in early June 2022 and again in October 25, 2022. He was treated with steroid and Azithromycin both times. He again is experiencing shortness of breath for the past 7 days associated with cough and congestion. Dyspnea with exertion and at night. Has nightly symptoms of cough, chest tightness, wheezing and shortness of breath. Cough is productive of dark-brown colored phlegm, no hemoptysis. He is requiring frequent as needed ventolin dosing at night. He has been using nebulizer therapy up to 4 times daily. He has no fever, chills nor sick contacts. He had a Z-pac which he just completed. He has associated nasal congestion and occasional nasal drip. He has been requiring to sleep upright for the past few nights. He also has sensation of abdominal bloating relieved with tums. He had a CXR ordered and performed today prior to clinic visit, which was reviewed and was unremarkable for acute process.    Overall, in between these attacks, he denies  significant symptoms aside from some dyspnea with overexertion. He does not have chronic cough. He does not use nebulizer therapy most days and uses Ventolin only a few times a week. He continues to smoke cigarettes, 4 cigarettes daily. He does notice that symptoms are better when he does cut down from cigarette use.    He reports triggers include change in weather and when he is exposed to fumes or pollen. He has sinus congestion and teary eyes with changes in weather. He reports allergies to certain trees.    His prior history of illness includes:  His respiratory symptoms started around 2020, but were mild and tolerable. Had COVID in Feb 2022 when he experienced fatigue and chills, which progressed to shortness of breath. He was evaluated in urgent care on March 5, 2022 and treated with 3 days of steroids and course of outpatient Doxycycline. A Chest CXR performed same day without acute finding. Symptoms initially improved, but persistented and has become worse again. He went to Urgent care on 3/22/22 and was prescribed Duoneb solution for nebulizer and Symbicort. On day of clinic visit in March, he complained of chills, productive cough and chest congestion with exertional dyspnea and completed course of Azithromycin and Prednisone since then.         Family Hx:  Strong family history of lung ca.    Pulmonary Function Test 5/23/2022:  FEV1/FVC ratio of 67  FEV1 of 2.40 L (63.7%) -> 2.60 L  FVC of 3.61 L (75.5%) -> 3.85 L  TLC of 96.2%  DLCO of 78.3%    CT CHEST 3/30/22:  There is a left upper lobe nodule measuring 0.3 cm on series 4, image 139.  The lungs show no findings consistent with emphysema.    CXR 3/23/22:  No acute pathology    CXR 6/4/2:  No acute pathology    CXR 11/16/22:  No acute pathology      Wt Readings from Last 40 Encounters:   01/30/24 81 kg (178 lb 9.2 oz)   12/28/23 79.2 kg (174 lb 9.7 oz)   09/23/23 79.4 kg (175 lb)   05/23/23 79.6 kg (175 lb 7.8 oz)   11/16/22 78.6 kg (173 lb 4.5 oz)    05/23/22 75.5 kg (166 lb 7.2 oz)   03/23/22 72.5 kg (159 lb 13.3 oz)   03/22/22 72.5 kg (159 lb 13.3 oz)   03/05/22 72.6 kg (160 lb)   09/15/21 72 kg (158 lb 11.7 oz)   07/12/21 74.4 kg (164 lb)   03/08/21 74.4 kg (164 lb 0.4 oz)   01/28/21 75.4 kg (166 lb 1.9 oz)   07/07/19 77.1 kg (170 lb)   04/18/19 77.1 kg (170 lb)   08/14/18 79.4 kg (175 lb 0.7 oz)   04/07/18 79.4 kg (175 lb)   04/06/18 81.6 kg (180 lb)   03/25/18 81.6 kg (179 lb 15.7 oz)         Objective:      Physical Exam   Constitutional: He is oriented to person, place, and time. He appears well-developed. He is not obese.   HENT:   Head: Normocephalic.   Nose: No mucosal edema.   Mouth/Throat: Oropharynx is clear and moist.   Cardiovascular: Normal rate, regular rhythm and normal heart sounds.   No murmur heard.  Pulmonary/Chest: Normal expansion. No stridor.   Abdominal: He exhibits no distension.   Musculoskeletal:         General: No edema.      Cervical back: Normal range of motion and neck supple.      Comments: Left toe gout   Neurological: He is alert and oriented to person, place, and time.   Skin: Skin is warm and dry.   Psychiatric: He has a normal mood and affect. His behavior is normal. Thought content normal.   Vitals reviewed.      Assessment:       No diagnosis found.        Outpatient Encounter Medications as of 1/30/2024   Medication Sig Dispense Refill    albuterol (PROVENTIL/VENTOLIN HFA) 90 mcg/actuation inhaler INHALE 2 PUFFS INTO THE LUNGS EVERY 6 (SIX) HOURS AS NEEDED FOR WHEEZING. RESCUE 54 g 0    budesonide-formoterol 80-4.5 mcg (SYMBICORT) 80-4.5 mcg/actuation HFAA Inhale 2 puffs into the lungs every 12 (twelve) hours. May also inhale 1 puff every 4 (four) hours as needed (cough and shortness of breath). 2 puffs twice daily and as needed up to additional 8 puffs per day. 10.2 g 11    buPROPion (WELLBUTRIN XL) 150 MG TB24 tablet Take 1 tablet by mouth every day. 30 tablet 11    montelukast (SINGULAIR) 10 mg tablet Take 10 mg by  mouth every evening.      nicotine polacrilex (NICORETTE) 4 MG Gum Take 1 each (4 mg total) by mouth as needed. 100 each 0    tiotropium bromide (SPIRIVA RESPIMAT) 1.25 mcg/actuation inhaler Inhale 2 puffs into the lungs once daily. Controller 4 g 11    pantoprazole (PROTONIX) 40 MG tablet Take 1 tablet (40 mg total) by mouth once daily. (Patient not taking: Reported on 5/23/2023) 30 tablet 2     No facility-administered encounter medications on file as of 1/30/2024.     No orders of the defined types were placed in this encounter.        Plan:       1. Asthma-COPD overlap syndrome  Group A <- D. Post bronchodilator FEV1/FVC ratio of 67, consistent with COPD. FEV1 of 63.7, moderate degree of airflow obstruction. Did have partial bronchodilator response with 200 mL improvement in FEV1. Clinical symptoms could be suggestive of some component of underlying asthma.  - CT Chest Lung Screening Low Dose; Future  - patient using Symbicort 80-4.5 2 puffs only. Use as needed, per SMART therapy       2. Cigarette smoker  - Ambulatory referral/consult to Smoking Cessation Program; Future  - CT Chest Lung Screening Low Dose; Future  - recommend getting Nicorette inhaler as well  - Wellbutrin prescribed    3. Snoring  - no witnessed apnea. Does drink a lot of coffee  - discussed possible sleep apnea, patient to think about this    4. Screening for lung cancer  - continue yearly scan  - next Jan 2025  - CT Chest Lung Screening Low Dose; Future    5. Gout  - on steroid  - his PCP has Allopurinol ordered for him

## 2024-02-07 ENCOUNTER — PATIENT MESSAGE (OUTPATIENT)
Dept: PRIMARY CARE CLINIC | Facility: CLINIC | Age: 54
End: 2024-02-07
Payer: COMMERCIAL

## 2024-02-16 ENCOUNTER — ON-DEMAND VIRTUAL (OUTPATIENT)
Dept: URGENT CARE | Facility: CLINIC | Age: 54
End: 2024-02-16
Payer: COMMERCIAL

## 2024-02-16 DIAGNOSIS — K04.7 DENTAL ABSCESS: Primary | ICD-10-CM

## 2024-02-16 PROCEDURE — 99213 OFFICE O/P EST LOW 20 MIN: CPT | Mod: CC,95,,

## 2024-02-16 RX ORDER — SULFAMETHOXAZOLE AND TRIMETHOPRIM 800; 160 MG/1; MG/1
1 TABLET ORAL 2 TIMES DAILY
Qty: 14 TABLET | Refills: 0 | Status: SHIPPED | OUTPATIENT
Start: 2024-02-16 | End: 2024-02-23

## 2024-02-16 NOTE — PROGRESS NOTES
Subjective:      Patient ID: Arnie Barrientos III is a 54 y.o. male.    Vitals:  vitals were not taken for this visit.     Chief Complaint: Dental Pain      Visit Type: TELE AUDIOVISUAL    Present with the patient at the time of consultation: TELEMED PRESENT WITH PATIENT: None    Past Medical History:   Diagnosis Date    Allergy      Past Surgical History:   Procedure Laterality Date    ANKLE SURGERY       Review of patient's allergies indicates:   Allergen Reactions    Pcn [penicillins] Nausea And Vomiting and Rash     Current Outpatient Medications on File Prior to Visit   Medication Sig Dispense Refill    albuterol (PROVENTIL/VENTOLIN HFA) 90 mcg/actuation inhaler INHALE 2 PUFFS INTO THE LUNGS EVERY 6 (SIX) HOURS AS NEEDED FOR WHEEZING. RESCUE 54 g 0    budesonide-formoterol 80-4.5 mcg (SYMBICORT) 80-4.5 mcg/actuation HFAA Inhale 2 puffs into the lungs every 12 (twelve) hours. May also inhale 1 puff every 4 (four) hours as needed (cough and shortness of breath). 2 puffs twice daily and as needed up to additional 8 puffs per day. 10.2 g 11    buPROPion (WELLBUTRIN XL) 150 MG TB24 tablet Take 1 tablet by mouth every day. 30 tablet 11    montelukast (SINGULAIR) 10 mg tablet Take 10 mg by mouth every evening.      nicotine polacrilex (NICORETTE) 4 MG Gum Take 1 each (4 mg total) by mouth as needed. 100 each 0    pantoprazole (PROTONIX) 40 MG tablet Take 1 tablet (40 mg total) by mouth once daily. (Patient not taking: Reported on 5/23/2023) 30 tablet 2    tiotropium bromide (SPIRIVA RESPIMAT) 1.25 mcg/actuation inhaler Inhale 2 puffs into the lungs once daily. Controller 4 g 11     No current facility-administered medications on file prior to visit.     Family History   Problem Relation Age of Onset    Lung cancer Mother     Arthritis Father     Urolithiasis Father     Cancer Sister     No Known Problems Maternal Grandfather     Cancer Paternal Grandmother     Heart attack Paternal Grandfather 70       Medications  Ordered                [id number] [place] - [address] [facility]   [address] [address]    Telephone: [phone]   Fax: [phone]   Hours: Not open 24 hours                         E-Prescribed (1 of 1)              sulfamethoxazole-trimethoprim 800-160mg (BACTRIM DS) 800-160 mg Tab    Sig: Take 1 tablet by mouth 2 (two) times daily. for 7 days       Start: 2/16/24     Quantity: 14 tablet Refills: 0                           Ohs Peq Odvv Intake    2/16/2024 10:02 AM CST - Filed by Patient   What is your current physical address in the event of a medical emergency? [address] [facility] [address] [facility]   Are you able to take your vital signs? No   Please attach any relevant images or files          Patient states that for the past several days he has been having dental pain on his right lower tooth. Patient states that the pain has continued to increase. Patient states that he is having gum swelling. Patient states that he believes he has a cavity on this tooth. Patient denies any other symptoms at this time     Dental Pain         Constitution: Negative.   HENT:  Positive for dental problem.    Neck: neck negative.   Cardiovascular: Negative.    Eyes: Negative.    Respiratory: Negative.     Gastrointestinal: Negative.    Endocrine: negative.   Genitourinary: Negative.    Musculoskeletal: Negative.    Skin: Negative.    Allergic/Immunologic: Negative.    Neurological: Negative.    Hematologic/Lymphatic: Negative.    Psychiatric/Behavioral: Negative.          Objective:   The physical exam was conducted virtually.  Physical Exam   HENT:   Head: Normocephalic and atraumatic.   Mouth/Throat: Dental caries present.   Eyes: Conjunctivae are normal. Pupils are equal, round, and reactive to light. Extraocular movement intact   Neck: Neck supple.   Pulmonary/Chest: Effort normal.   Abdominal: Normal appearance.   Musculoskeletal: Normal range of motion.         General: Normal range  of motion.   Neurological: no focal deficit. He is alert.   Skin: Skin is warm.   Psychiatric: His behavior is normal. Mood, judgment and thought content normal.       Assessment:     1. Dental abscess        Plan:       Dental abscess  -     sulfamethoxazole-trimethoprim 800-160mg (BACTRIM DS) 800-160 mg Tab; Take 1 tablet by mouth 2 (two) times daily. for 7 days  Dispense: 14 tablet; Refill: 0

## 2024-02-16 NOTE — PATIENT INSTRUCTIONS
You must understand that you've received an Urgent Care treatment only and that you may be released before all your medical problems are known or treated. You, the patient, will arrange for follow up care as instructed.  Follow up with your PCP or specialty clinic as directed in the next 1-2 weeks if not improved or as needed.  You can call [phone] to schedule an appointment with the appropriate provider.  If your condition worsens we recommend that you receive another evaluation at the emergency room immediately or contact your primary medical clinics after hours call service to discuss your concerns.  Please return here or go to the Emergency Department for any concerns or worsening of condition.  Please if you smoke please consider quitting. [facility] Sm[facility] cessation hotline number is [phone], available at this number is free counseling and medications to live a healthier life!         If you were prescribed a narcotic or controlled medication, do not drive or operate heavy equipment or machinery while taking these medications.

## 2024-03-02 ENCOUNTER — OFFICE VISIT (OUTPATIENT)
Dept: URGENT CARE | Facility: CLINIC | Age: 54
End: 2024-03-02
Payer: COMMERCIAL

## 2024-03-02 VITALS
BODY MASS INDEX: 26.36 KG/M2 | RESPIRATION RATE: 20 BRPM | OXYGEN SATURATION: 97 % | SYSTOLIC BLOOD PRESSURE: 107 MMHG | HEIGHT: 69 IN | DIASTOLIC BLOOD PRESSURE: 71 MMHG | HEART RATE: 97 BPM | WEIGHT: 178 LBS | TEMPERATURE: 98 F

## 2024-03-02 DIAGNOSIS — M79.675 PAIN OF LEFT GREAT TOE: ICD-10-CM

## 2024-03-02 DIAGNOSIS — M10.472 ACUTE GOUT DUE TO OTHER SECONDARY CAUSE INVOLVING TOE OF LEFT FOOT: Primary | ICD-10-CM

## 2024-03-02 PROCEDURE — 96372 THER/PROPH/DIAG INJ SC/IM: CPT | Mod: S$GLB,,, | Performed by: FAMILY MEDICINE

## 2024-03-02 PROCEDURE — 99214 OFFICE O/P EST MOD 30 MIN: CPT | Mod: 25,S$GLB,, | Performed by: FAMILY MEDICINE

## 2024-03-02 RX ORDER — ALLOPURINOL 100 MG/1
100 TABLET ORAL DAILY
Qty: 30 TABLET | Refills: 6 | Status: SHIPPED | OUTPATIENT
Start: 2024-03-02

## 2024-03-02 RX ORDER — DEXAMETHASONE SODIUM PHOSPHATE 100 MG/10ML
10 INJECTION INTRAMUSCULAR; INTRAVENOUS
Status: COMPLETED | OUTPATIENT
Start: 2024-03-02 | End: 2024-03-02

## 2024-03-02 RX ORDER — INDOMETHACIN 50 MG/1
50 CAPSULE ORAL 3 TIMES DAILY PRN
Qty: 30 CAPSULE | Refills: 0 | Status: SHIPPED | OUTPATIENT
Start: 2024-03-02

## 2024-03-02 RX ADMIN — DEXAMETHASONE SODIUM PHOSPHATE 10 MG: 100 INJECTION INTRAMUSCULAR; INTRAVENOUS at 11:03

## 2024-03-02 NOTE — PROGRESS NOTES
"Subjective:      Patient ID: Arnie Barrientos III is a 54 y.o. male.    Vitals:  height is 5' 9" (1.753 m) and weight is 80.7 kg (178 lb). His oral temperature is 98.1 °F (36.7 °C). His blood pressure is 107/71 and his pulse is 97. His respiration is 20 and oxygen saturation is 97%.     Chief Complaint: Foot Pain, HX- Gout     Pt present with pain with his left foot that he has had a history of gout and thinks its another gout flare up X 2 days.  No injury or trauma    Left big toe with moderate swelling and warmth      ROS   Objective:     Physical Exam   Constitutional: He is oriented to person, place, and time. He appears well-developed. He is cooperative.  Non-toxic appearance. He does not appear ill. No distress.   HENT:   Head: Normocephalic and atraumatic.   Ears:   Right Ear: Hearing, tympanic membrane, external ear and ear canal normal.   Left Ear: Hearing, tympanic membrane, external ear and ear canal normal.   Nose: Nose normal. No mucosal edema, rhinorrhea or nasal deformity. No epistaxis. Right sinus exhibits no maxillary sinus tenderness and no frontal sinus tenderness. Left sinus exhibits no maxillary sinus tenderness and no frontal sinus tenderness.   Mouth/Throat: Uvula is midline, oropharynx is clear and moist and mucous membranes are normal. No trismus in the jaw. Normal dentition. No uvula swelling. No posterior oropharyngeal erythema.   Eyes: Conjunctivae and lids are normal. Right eye exhibits no discharge. Left eye exhibits no discharge. No scleral icterus.   Neck: Trachea normal and phonation normal. Neck supple.   Cardiovascular: Normal rate, regular rhythm, normal heart sounds and normal pulses.   Pulmonary/Chest: Effort normal and breath sounds normal. No respiratory distress.   Abdominal: Normal appearance and bowel sounds are normal. He exhibits no distension and no mass. Soft. There is no abdominal tenderness.   Musculoskeletal: Normal range of motion.         General: No deformity. " Normal range of motion.   Neurological: He is alert and oriented to person, place, and time. He exhibits normal muscle tone. Coordination normal.   Skin: Skin is warm, dry, intact, not diaphoretic and not pale.   Psychiatric: His speech is normal and behavior is normal. Judgment and thought content normal.   Nursing note and vitals reviewed.      Assessment:     1. Acute gout due to other secondary cause involving toe of left foot    2. Pain of left great toe        Plan:       Acute gout due to other secondary cause involving toe of left foot    Pain of left great toe    Other orders  -     dexAMETHasone injection 10 mg  -     indomethacin (INDOCIN) 50 MG capsule; Take 1 capsule (50 mg total) by mouth 3 (three) times daily as needed.  Dispense: 30 capsule; Refill: 0  -     allopurinoL (ZYLOPRIM) 100 MG tablet; Take 1 tablet (100 mg total) by mouth once daily.  Dispense: 30 tablet; Refill: 6               Pt advised to follow up with PCP    Nolan Allopurinol after a week

## 2024-04-20 NOTE — TELEPHONE ENCOUNTER
Notified pt wife Shanta that his RX percocet  mg is ready for pick. Pt wife must have valid ID to  RX. [facility]th [facility] hours Monday-Friday 8AM - 5PM. Close on weekends and holidays. Please call for updates. Patient wife states verbal understanding and has no further questions.     Pt in room and was encouraged by this RN to eat dinner. Per pt, he was able to void x1 today. Will continue to encourage food and fluids. Will encourage pt to shower after dinner. Will continue to monitor.    1710  Pt noted to have eaten 50% of meal. Provided pt with 2 more cups of juice. Pt refusing to shower at this time. Will continue to monitor pt and intervene as needed.

## 2024-09-01 ENCOUNTER — OFFICE VISIT (OUTPATIENT)
Dept: URGENT CARE | Facility: CLINIC | Age: 54
End: 2024-09-01
Payer: COMMERCIAL

## 2024-09-01 VITALS
HEIGHT: 69 IN | RESPIRATION RATE: 18 BRPM | HEART RATE: 91 BPM | OXYGEN SATURATION: 98 % | SYSTOLIC BLOOD PRESSURE: 123 MMHG | TEMPERATURE: 98 F | WEIGHT: 176.38 LBS | DIASTOLIC BLOOD PRESSURE: 74 MMHG | BODY MASS INDEX: 26.12 KG/M2

## 2024-09-01 DIAGNOSIS — M10.072 ACUTE IDIOPATHIC GOUT OF LEFT FOOT: Primary | ICD-10-CM

## 2024-09-01 PROCEDURE — 96372 THER/PROPH/DIAG INJ SC/IM: CPT | Mod: S$GLB,,,

## 2024-09-01 PROCEDURE — 99213 OFFICE O/P EST LOW 20 MIN: CPT | Mod: 25,S$GLB,,

## 2024-09-01 RX ORDER — KETOROLAC TROMETHAMINE 30 MG/ML
30 INJECTION, SOLUTION INTRAMUSCULAR; INTRAVENOUS
Status: COMPLETED | OUTPATIENT
Start: 2024-09-01 | End: 2024-09-01

## 2024-09-01 RX ORDER — IBUPROFEN 600 MG/1
600 TABLET ORAL 3 TIMES DAILY
Qty: 45 TABLET | Refills: 0 | Status: SHIPPED | OUTPATIENT
Start: 2024-09-01

## 2024-09-01 RX ORDER — DEXAMETHASONE SODIUM PHOSPHATE 10 MG/ML
10 INJECTION INTRAMUSCULAR; INTRAVENOUS
Status: COMPLETED | OUTPATIENT
Start: 2024-09-01 | End: 2024-09-01

## 2024-09-01 RX ADMIN — DEXAMETHASONE SODIUM PHOSPHATE 10 MG: 10 INJECTION INTRAMUSCULAR; INTRAVENOUS at 05:09

## 2024-09-01 RX ADMIN — KETOROLAC TROMETHAMINE 30 MG: 30 INJECTION, SOLUTION INTRAMUSCULAR; INTRAVENOUS at 05:09

## 2024-09-01 NOTE — PROGRESS NOTES
"Subjective:      Patient ID: Arnie Barrientos III is a 54 y.o. male.    Vitals:  height is 5' 9" (1.753 m) and weight is 80 kg (176 lb 5.9 oz). His oral temperature is 98.2 °F (36.8 °C). His blood pressure is 123/74 and his pulse is 91. His respiration is 18 and oxygen saturation is 98%.     Chief Complaint: Gout    54-year-old male presents to the clinic today with chief complaint of possible gout in left ankle. Symptoms started 3 days ago and have not improved.  Denies any acute injury or trauma to the area. Denies any previous surgeries or injuries on affected area. Patient has taken ibuprofen with no relief.  Pain is 9/10 sharp pain. He does have a hx of gout. He states area is very sensitive to the touch. He is currently ambulating with crutches. He states he has been eating a lot more red meat this past week. Patient notes weight bearing makes it worse and rest improves symptoms. Denies any erythema, abrasions, swelling, ecchymosis, drainage, warmth, fluctuance, lacerations, or deformities. Denies numbness or tingling. Denies radiation of pain.  Denies fever, body aches, chest pain, shortness of breath, abdominal pain, N/V/D, or rashes.      Ankle Pain   The incident occurred at home. There was no injury mechanism. The pain is present in the left ankle. The quality of the pain is described as aching. The pain is at a severity of 9/10. The pain is severe. The pain has been Constant since onset. Associated symptoms include an inability to bear weight. He reports no foreign bodies present. The symptoms are aggravated by movement. He has tried NSAIDs for the symptoms. The treatment provided no relief.     Constitution: Negative for activity change, chills and fever.   HENT:  Negative for ear pain and sore throat.    Neck: Negative for neck pain.   Cardiovascular:  Negative for chest pain.   Eyes:  Negative for eye pain.   Respiratory:  Negative for shortness of breath.    Gastrointestinal:  Negative for abdominal " pain, nausea, vomiting and diarrhea.   Genitourinary:  Negative for dysuria.   Musculoskeletal:  Positive for joint pain, joint swelling and gout. Negative for pain and muscle ache.   Skin:  Negative for rash and erythema.   Allergic/Immunologic: Negative for environmental allergies and seasonal allergies.   Neurological:  Negative for dizziness and headaches.   Psychiatric/Behavioral:  Negative for nervous/anxious. The patient is not nervous/anxious.       Objective:     Physical Exam   Constitutional: He is oriented to person, place, and time. He appears well-developed.   HENT:   Head: Normocephalic and atraumatic. Head is without abrasion, without contusion and without laceration.   Ears:   Right Ear: External ear normal.   Left Ear: External ear normal.   Nose: Nose normal.   Mouth/Throat: Oropharynx is clear and moist and mucous membranes are normal.   Eyes: Conjunctivae, EOM and lids are normal. Pupils are equal, round, and reactive to light.   Neck: Trachea normal and phonation normal. Neck supple.   Pulmonary/Chest: Effort normal. No respiratory distress.   Musculoskeletal: Normal range of motion.         General: Normal range of motion.      Left ankle: He exhibits swelling. He exhibits normal range of motion, no ecchymosis, no deformity, no laceration and normal pulse. Tenderness. Achilles tendon exhibits no defect and normal Mendiola's test results.      Left foot: Normal. Normal range of motion and normal capillary refill. No tenderness, bony tenderness, swelling or crepitus.        Feet:    Neurological: He is alert and oriented to person, place, and time.   Skin: Skin is warm, dry, intact and no rash. Capillary refill takes less than 2 seconds. not left ankleNo abrasion, No burn, No bruising, No erythema and No ecchymosis   Psychiatric: His speech is normal and behavior is normal. Judgment and thought content normal.   Nursing note and vitals reviewed.      Assessment:     1. Acute idiopathic gout of  left foot        Plan:       Acute idiopathic gout of left foot  -     dexAMETHasone injection 10 mg  -     ketorolac injection 30 mg  -     ibuprofen (ADVIL,MOTRIN) 600 MG tablet; Take 1 tablet (600 mg total) by mouth 3 (three) times daily.  Dispense: 45 tablet; Refill: 0        We had shared decision making for patient's treatment. We discussed side effects/alternatives/benefits/risk and patient would like to proceed with treatment plan. We also discussed other OTC treatment recommendations. Patient was counseled, explained with the test results meaning, expected course, and answered all of questions. Patient can take OTC Acetaminophen (Tylenol) and/or Ibuprofen (Motrin) as needed for pain relief and/or fever relief. Continue to drink plenty of fluids. Follow up with PCP in the next 1-2 weeks as needed.  Gave patient strict ER/urgent care precautions in case symptoms worsen or if any new concerns arise.

## 2024-09-01 NOTE — PATIENT INSTRUCTIONS
Please drink plenty of fluids.  Please get plenty of rest.  Please return here or go to the Emergency Department for any concerns or worsening of condition.  If you were prescribed a narcotic medication, do not drive or operate heavy equipment or machinery while taking these medications.  If you were not prescribed an anti-inflammatory medication, and if you do not have any history of stomach/intestinal ulcers, or kidney disease, or are not taking a blood thinner such as Coumadin, Plavix, Pradaxa, Eloquis, or Xaralta for example, it is OK to take over the counter Ibuprofen or Advil or Motrin or Aleve as directed.  Do not take these medications on an empty stomach.  You received an injection of a powerful NSAID today (Toradol).  It's effects will last up to 24 hours. Please do not take another NSAID (ie aspirin, ibuprofen, Aleve, Advil or Motrin) until this time tomorrow.  If you continue to have pain, you may take Tylenol (acetaminophen) if you are not allergic to this medication.  Rest, ice, compression and elevation to the affected joint or limb as needed.  Please follow up with your primary care doctor or specialist as needed.    If you  smoke, please stop smoking.

## 2025-01-10 ENCOUNTER — TELEPHONE (OUTPATIENT)
Dept: PULMONOLOGY | Facility: CLINIC | Age: 55
End: 2025-01-10
Payer: COMMERCIAL

## 2025-01-10 NOTE — TELEPHONE ENCOUNTER
Called and spoke with pt to confirm scheduling of follow up LDCT scan.  Agreeable with scheduling on January 27th, instructions given on where to go.

## 2025-01-27 ENCOUNTER — HOSPITAL ENCOUNTER (OUTPATIENT)
Dept: RADIOLOGY | Facility: HOSPITAL | Age: 55
Discharge: HOME | End: 2025-01-27
Attending: INTERNAL MEDICINE
Payer: COMMERCIAL

## 2025-01-27 DIAGNOSIS — Z12.2 SCREENING FOR LUNG CANCER: ICD-10-CM

## 2025-01-27 DIAGNOSIS — F17.210 CIGARETTE SMOKER: ICD-10-CM

## 2025-01-27 DIAGNOSIS — J44.89 ASTHMA-COPD OVERLAP SYNDROME: ICD-10-CM

## 2025-01-27 PROCEDURE — 71271 CT THORAX LUNG CANCER SCR C-: CPT | Mod: TC

## 2025-01-27 PROCEDURE — 71271 CT THORAX LUNG CANCER SCR C-: CPT | Mod: 26,,, | Performed by: RADIOLOGY

## 2025-01-28 ENCOUNTER — PATIENT MESSAGE (OUTPATIENT)
Dept: SLEEP MEDICINE | Facility: CLINIC | Age: 55
End: 2025-01-28
Payer: COMMERCIAL

## 2025-02-14 RX ORDER — BUPROPION HYDROCHLORIDE 150 MG/1
TABLET ORAL
Qty: 30 TABLET | Refills: 11 | Status: SHIPPED | OUTPATIENT
Start: 2025-02-14

## 2025-04-07 RX ORDER — ALLOPURINOL 100 MG/1
100 TABLET ORAL DAILY
Qty: 30 TABLET | Refills: 6 | Status: CANCELLED | OUTPATIENT
Start: 2025-04-07

## 2025-04-09 RX ORDER — ALLOPURINOL 100 MG/1
100 TABLET ORAL DAILY
Qty: 30 TABLET | Refills: 6 | Status: CANCELLED | OUTPATIENT
Start: 2025-04-07

## 2025-04-17 ENCOUNTER — ON-DEMAND VIRTUAL (OUTPATIENT)
Dept: URGENT CARE | Facility: CLINIC | Age: 55
End: 2025-04-17
Payer: COMMERCIAL

## 2025-04-17 DIAGNOSIS — M10.9 EXACERBATION OF GOUT: Primary | ICD-10-CM

## 2025-04-17 RX ORDER — COLCHICINE 0.6 MG/1
TABLET ORAL
Qty: 10 TABLET | Refills: 0 | Status: SHIPPED | OUTPATIENT
Start: 2025-04-17

## 2025-04-17 RX ORDER — INDOMETHACIN 50 MG/1
50 CAPSULE ORAL 3 TIMES DAILY PRN
Qty: 30 CAPSULE | Refills: 0 | Status: SHIPPED | OUTPATIENT
Start: 2025-04-17

## 2025-04-18 NOTE — PROGRESS NOTES
Subjective:      Patient ID: Arnie Barrientos III is a 55 y.o. male.    Vitals:  vitals were not taken for this visit.     Chief Complaint: Gout      Visit Type: TELE AUDIOVISUAL    Patient Location: Home [place]    Present with the patient at the time of consultation: TELEMED PRESENT WITH PATIENT: None    Past Medical History:   Diagnosis Date    Allergy      Past Surgical History:   Procedure Laterality Date    ANKLE SURGERY       Review of patient's allergies indicates:   Allergen Reactions    Pcn [penicillins] Nausea And Vomiting and Rash     Medications Ordered Prior to Encounter[1]  Family History   Problem Relation Name Age of Onset    Lung cancer Mother      Arthritis Father      Urolithiasis Father      Cancer Sister      No Known Problems Maternal Grandfather      Cancer Paternal Grandmother      Heart attack Paternal Grandfather  70       Medications Ordered                [id number] [place] - [address] [facility]   [address] [address]    Telephone: [phone]   Fax: [phone]   Hours: Not open 24 hours                         E-Prescribed (2 of 2)              colchicine (COLCRYS) 0.6 mg tablet    Sig: Take 2 tablets once, then 1 tablet in 1 hour.       Start: 4/17/25     Quantity: 10 tablet Refills: 0                         indomethacin (INDOCIN) 50 MG capsule    Sig: Take 1 capsule (50 mg total) by mouth 3 (three) times daily as needed (gout flares).       Start: 4/17/25     Quantity: 30 capsule Refills: 0                           Ohs Peq Odvv Intake    4/17/2025  6:44 PM CDT - Filed by Patient   What is your current physical address in the event of a medical emergency? [address]   Are you able to take your vital signs? No   Please attach any relevant images or files    Is your employer contracted with [facility]? No         Patient doing virtual for gout flare to left great toe  Started yesterday  Pain is mild,  yesterday it was 5/10  Had steak and crawfish recently  Also drinks 3 beer nightly         Constitution: Positive for activity change.   Musculoskeletal:  Positive for pain, joint pain, joint swelling and gout.   Skin:  Positive for color change.        Objective:   The physical exam was conducted virtually.  Physical Exam   Constitutional: He does not appear ill. No distress.   HENT:   Head: Normocephalic and atraumatic.   Nose: Nose normal.   Eyes: Conjunctivae are normal. Right eye exhibits no discharge. Left eye exhibits no discharge.   Pulmonary/Chest: Effort normal. No stridor. No respiratory distress.   Abdominal: Normal appearance.   Neurological: He is alert and at baseline.   Psychiatric: His behavior is normal. Mood and thought content normal.       Assessment:     1. Exacerbation of gout        Plan:       Exacerbation of gout  -     indomethacin (INDOCIN) 50 MG capsule; Take 1 capsule (50 mg total) by mouth 3 (three) times daily as needed (gout flares).  Dispense: 30 capsule; Refill: 0  -     colchicine (COLCRYS) 0.6 mg tablet; Take 2 tablets once, then 1 tablet in 1 hour.  Dispense: 10 tablet; Refill: 0    This is a chronic condition with an exacerbation that requires prescription drug management    Self care for gout  Rest by elevating the affected joint.    An ice pack may help.  Apply the ice pack for 15 minutes at a time.      -Reduce the amount of animal protein you eat.   Organ meats (liver, brains, kidney and sweetbreads), anchovies, herring and mackerel are particularly high in purines.   -Avoid alcohol. Alcohol can inhibit the excretion of uric acid. If you're having a gout attack, it's best to avoid alcohol completely.   -Drink plenty of liquids. Fluids help dilute uric acid in your blood and urine, so be sure you get enough water and other fluids every day and to help avoid forming kidney stones.    Follow up in 1 week if symptoms do not resolve, sooner if worse.                   [1]    Current Outpatient Medications on File Prior to Visit   Medication Sig Dispense Refill    albuterol (PROVENTIL/VENTOLIN HFA) 90 mcg/actuation inhaler INHALE 2 PUFFS INTO THE LUNGS EVERY 6 (SIX) HOURS AS NEEDED FOR WHEEZING. RESCUE (Patient not taking: Reported on 9/1/2024) 54 g 0    allopurinoL (ZYLOPRIM) 100 MG tablet Take 1 tablet (100 mg total) by mouth once daily. 30 tablet 6    amoxicillin (AMOXIL) 500 MG Tab Take 2 tablets by mouth to start and take 1 tablet by mouth 4 times a day until gone 32 tablet 0    amoxicillin (AMOXIL) 875 MG tablet Take 2 tablets by mouth now, then 1 tablet every 8 hours until all finished 21 tablet 0    budesonide-formoterol 80-4.5 mcg (SYMBICORT) 80-4.5 mcg/actuation HFAA Inhale 2 puffs into the lungs every 12 (twelve) hours. May also inhale 1 puff every 4 (four) hours as needed (cough and shortness of breath). 2 puffs twice daily and as needed up to additional 8 puffs per day. (Patient not taking: Reported on 9/1/2024) 10.2 g 11    buPROPion (WELLBUTRIN XL) 150 MG TB24 tablet Take 1 tablet by mouth every day. 30 tablet 11    ibuprofen (ADVIL,MOTRIN) 600 MG tablet Take 1 tablet (600 mg total) by mouth 3 (three) times daily. 45 tablet 0    montelukast (SINGULAIR) 10 mg tablet Take 10 mg by mouth every evening. (Patient not taking: Reported on 9/1/2024)      nicotine polacrilex (NICORETTE) 4 MG Gum Take 1 each (4 mg total) by mouth as needed. (Patient not taking: Reported on 9/1/2024) 100 each 0    pantoprazole (PROTONIX) 40 MG tablet Take 1 tablet (40 mg total) by mouth once daily. (Patient not taking: Reported on 5/23/2023) 30 tablet 2    tiotropium bromide (SPIRIVA RESPIMAT) 1.25 mcg/actuation inhaler Inhale 2 puffs into the lungs once daily. Controller (Patient not taking: Reported on 9/1/2024) 4 g 11    [DISCONTINUED] ibuprofen (ADVIL,MOTRIN) 800 MG tablet Take 1 tablet by mouth every 6 hours 40 tablet 0    [DISCONTINUED] indomethacin (INDOCIN) 50 MG capsule Take 1 capsule (50  mg total) by mouth 3 (three) times daily as needed. (Patient not taking: Reported on 9/1/2024) 30 capsule 0     No current facility-administered medications on file prior to visit.

## 2025-04-21 RX ORDER — ALLOPURINOL 100 MG/1
100 TABLET ORAL DAILY
Qty: 30 TABLET | Refills: 6 | OUTPATIENT
Start: 2025-04-21

## 2025-05-09 ENCOUNTER — OFFICE VISIT (OUTPATIENT)
Dept: PRIMARY CARE CLINIC | Facility: CLINIC | Age: 55
End: 2025-05-09
Payer: COMMERCIAL

## 2025-05-09 VITALS
HEART RATE: 80 BPM | DIASTOLIC BLOOD PRESSURE: 81 MMHG | BODY MASS INDEX: 24.91 KG/M2 | SYSTOLIC BLOOD PRESSURE: 132 MMHG | WEIGHT: 168.63 LBS

## 2025-05-09 DIAGNOSIS — N48.1 BALANITIS: Primary | ICD-10-CM

## 2025-05-09 PROBLEM — M25.673 DECREASED RANGE OF MOTION OF ANKLE: Status: RESOLVED | Noted: 2018-06-27 | Resolved: 2025-05-09

## 2025-05-09 PROBLEM — R29.898 ANKLE WEAKNESS: Status: RESOLVED | Noted: 2018-06-27 | Resolved: 2025-05-09

## 2025-05-09 PROBLEM — S82.871A CLOSED RIGHT PILON FRACTURE, INITIAL ENCOUNTER: Status: RESOLVED | Noted: 2018-04-06 | Resolved: 2025-05-09

## 2025-05-09 PROBLEM — M25.571 ANKLE PAIN, RIGHT: Chronic | Status: RESOLVED | Noted: 2018-03-25 | Resolved: 2025-05-09

## 2025-05-09 PROBLEM — R26.89 IMPAIRED GAIT AND MOBILITY: Status: RESOLVED | Noted: 2018-06-27 | Resolved: 2025-05-09

## 2025-05-09 RX ORDER — DOXYCYCLINE HYCLATE 100 MG
100 TABLET ORAL 2 TIMES DAILY
Qty: 14 TABLET | Refills: 0 | Status: SHIPPED | OUTPATIENT
Start: 2025-05-09 | End: 2025-05-16

## 2025-05-09 RX ORDER — MUPIROCIN 20 MG/G
OINTMENT TOPICAL 2 TIMES DAILY
Qty: 22 G | Refills: 0 | Status: SHIPPED | OUTPATIENT
Start: 2025-05-09 | End: 2025-05-16

## 2025-05-09 RX ORDER — CLOTRIMAZOLE AND BETAMETHASONE DIPROPIONATE 10; .64 MG/G; MG/G
CREAM TOPICAL 2 TIMES DAILY
Qty: 30 G | Refills: 0 | Status: SHIPPED | OUTPATIENT
Start: 2025-05-09

## 2025-05-09 NOTE — PROGRESS NOTES
55-year-old gentleman with a history of COPD (still smoking 3/4 of a pack per day),  with a complaint of a rash in his genital area.    History illness and pertinent review of systems:  The patient works as a  and often sweats while he is working.  He has noted itching in the groin area the past 3 months.  He does not notice a rash per se.  There was no weeping or sweating or discharge.  The patient wears cotton underwear.  The patient notes that he has tried an antibiotic cream in an antifungal cream without success.  The patient has no risk for STD.    Problem list, medication list, and allergies have been reviewed.  The patient had a reaction when he was 9 years old but was given penicillin again a year ago without any problem.    Review of systems is otherwise negative.    Physical Exam  Vitals reviewed.   Constitutional:       General: He is not in acute distress.     Appearance: Normal appearance. He is normal weight. He is not ill-appearing, toxic-appearing or diaphoretic.   HENT:      Head: Atraumatic.      Nose: Nose normal. No rhinorrhea.      Mouth/Throat:      Mouth: Mucous membranes are moist.      Pharynx: Oropharynx is clear. No posterior oropharyngeal erythema.   Eyes:      General: No scleral icterus.     Conjunctiva/sclera: Conjunctivae normal.   Neck:      Vascular: No carotid bruit.   Cardiovascular:      Rate and Rhythm: Regular rhythm.      Pulses: Normal pulses.      Heart sounds: Normal heart sounds. No murmur heard.     No gallop.   Pulmonary:      Effort: Pulmonary effort is normal. No respiratory distress.      Breath sounds: Normal breath sounds. No wheezing, rhonchi or rales.   Abdominal:      General: Bowel sounds are normal.      Palpations: Abdomen is soft.      Tenderness: There is no abdominal tenderness.   Musculoskeletal:         General: No tenderness.      Cervical back: Neck supple. No tenderness.      Right lower leg: No edema.      Left lower leg: No edema.    Lymphadenopathy:      Cervical: No cervical adenopathy.   Skin:     Coloration: Skin is not jaundiced or pale.      Findings: Rash present. No lesion.      Comments: The patient has a balanitis of the head of the penis   Neurological:      General: No focal deficit present.      Mental Status: He is alert and oriented to person, place, and time.   Psychiatric:         Mood and Affect: Mood normal.         Behavior: Behavior normal.      Assessment/plan  Balanitis:  This has most likely bacterial is the patient has a history of sweating and scratching.  Plan:  Reviewed with patient  Doxycycline 100 mg b.i.d. for 7 days   Mupirocin with Lotrisone cream twice daily after gently washing the area with a baby soap or shampoo  If not better the patient should see a dermatologist

## 2025-05-09 NOTE — PATIENT INSTRUCTIONS
Thank you for visiting today.  This has likely a balanitis caused by bacteria.  I have prescribed doxycycline which is an oral antibiotic as well as to creams 1 antibacterial in the other antifungal which also contains a small amount of steroid.  You can mix those creams together  Prior to putting on those creams twice a day please wash your penis gently with a baby shampoo or soap.  Do not do this aggressively but gently  If you are not showing improvement in 5-7 days please see a dermatologist

## 2025-05-25 ENCOUNTER — ON-DEMAND VIRTUAL (OUTPATIENT)
Dept: URGENT CARE | Facility: CLINIC | Age: 55
End: 2025-05-25
Payer: COMMERCIAL

## 2025-05-25 DIAGNOSIS — M10.9 ACUTE GOUT OF RIGHT FOOT, UNSPECIFIED CAUSE: Primary | ICD-10-CM

## 2025-05-25 PROCEDURE — 98006 SYNCH AUDIO-VIDEO EST MOD 30: CPT | Mod: CC,95,, | Performed by: NURSE PRACTITIONER

## 2025-05-25 RX ORDER — ALLOPURINOL 100 MG/1
100 TABLET ORAL DAILY
Qty: 30 TABLET | Refills: 1 | Status: SHIPPED | OUTPATIENT
Start: 2025-05-25 | End: 2025-07-24

## 2025-05-25 NOTE — PROGRESS NOTES
Subjective:      Patient ID: Arnie Barrientos III is a 55 y.o. male.    Vitals:  vitals were not taken for this visit.     Chief Complaint: Gout and Medication Refill (Allopurinol/)      Visit Type: TELE AUDIOVISUAL    Patient Location[place]     Present with the patient at the time of consultation: TELEMED PRESENT WITH PATIENT: spouse    Past Medical History:   Diagnosis Date    Allergy      Past Surgical History:   Procedure Laterality Date    ANKLE SURGERY       Review of patient's allergies indicates:  No Known Allergies  Medications Ordered Prior to Encounter[1]  Family History   Problem Relation Name Age of Onset    Lung cancer Mother      Arthritis Father      Urolithiasis Father      Cancer Sister      No Known Problems Maternal Grandfather      Cancer Paternal Grandmother      Heart attack Paternal Grandfather  70       Medications Ordered                [id number] - [place] - [address] AT [facility]   [address] [address]    Telephone: [phone]   Fax: [phone]   Hours: Not open 24 hours                         E-Prescribed (1 of 1)              allopurinoL (ZYLOPRIM) 100 MG tablet    Sig: Take 1 tablet (100 mg total) by mouth once daily.       Start: 5/25/25     Quantity: 30 tablet Refills: 1                           Oh[facility] Peq Odvv Intake    5/25/2025 10:58 AM CDT - Filed by Patient   What is your current physical address in the event of a medical emergency? [address]   Are you able to take your vital signs? No   Please attach any relevant images or files    Is your employer contracted with [facility]? No         Pt present with gout to right foot x 1 day, noted after eating a pork chop.  Requesting allopurinol refill, as notes it works best for him.  Denies fever, cp, sob, dizziness, or ha.     Medication Refill  This is a new problem. The current episode started yesterday. Associated symptoms include arthralgias and  joint swelling. Pertinent negatives include no chest pain, fever or headaches.       Constitution: Negative for fever.   Cardiovascular:  Negative for chest pain.   Respiratory:  Negative for shortness of breath.    Musculoskeletal:  Positive for joint pain, joint swelling and gout.   Neurological:  Negative for dizziness and headaches.        Objective:   The physical exam was conducted virtually.  Physical Exam   Constitutional: He is oriented to person, place, and time. No distress.   HENT:   Head: Normocephalic.   Ears:   Right Ear: External ear normal.   Left Ear: External ear normal.   Nose: No rhinorrhea or congestion.   Eyes: Conjunctivae are normal.   Neck: Neck supple.   Pulmonary/Chest: Effort normal. No respiratory distress.   Neurological: He is alert and oriented to person, place, and time.   Skin: Skin is no rash.       Assessment:     1. Acute gout of right foot, unspecified cause        Plan:   Take meds as directed  Limit foods that cause gout flare up     Chronic condition requiring prescription drug management.     Patient encouraged to monitor symptoms closely and instructed to follow-up for new or worsening symptoms. Further, in-person, evaluation may be necessary for continued treatment.     Please follow up with your primary care doctor or specialist as needed. Verbally discussed plan      Acute gout of right foot, unspecified cause  -     allopurinoL (ZYLOPRIM) 100 MG tablet; Take 1 tablet (100 mg total) by mouth once daily.  Dispense: 30 tablet; Refill: 1    We appreciate you trusting us with your medical care. We hope you feel better soon. We will be happy to take care of you for all of your future medical needs.     You must understand that you've received Virtual treatment only and that you may be released before all your medical problems are known or treated. You, the patient, will arrange for follow up care as instructed.     Follow up with your PCP or specialty clinic as directed in  the next 1-2 weeks if not improved or as needed. You can call [phone] to schedule an appointment with the appropriate provider.     If your condition worsens we recommend that you receive another evaluation in person, with your primary care provider, urgent care or at the emergency room immediately or contact your primary medical clinics after hours call service to discuss your concerns.                     [1]   Current Outpatient Medications on File Prior to Visit   Medication Sig Dispense Refill    albuterol (PROVENTIL/VENTOLIN HFA) 90 mcg/actuation inhaler INHALE 2 PUFFS INTO THE LUNGS EVERY 6 (SIX) HOURS AS NEEDED FOR WHEEZING. RESCUE 54 g 0    budesonide-formoterol 80-4.5 mcg (SYMBICORT) 80-4.5 mcg/actuation HFAA Inhale 2 puffs into the lungs every 12 (twelve) hours. May also inhale 1 puff every 4 (four) hours as needed (cough and shortness of breath). 2 puffs twice daily and as needed up to additional 8 puffs per day. (Patient not taking: Reported on 9/1/2024) 10.2 g 11    clotrimazole-betamethasone 1-0.05% (LOTRISONE) cream Apply topically 2 (two) times daily. 30 g 0    colchicine (COLCRYS) 0.6 mg tablet Take 2 tablets once, then 1 tablet in 1 hour. 10 tablet 0    montelukast (SINGULAIR) 10 mg tablet Take 10 mg by mouth every evening.      nicotine polacrilex (NICORETTE) 4 MG Gum Take 1 each (4 mg total) by mouth as needed. 100 each 0    tiotropium bromide (SPIRIVA RESPIMAT) 1.25 mcg/actuation inhaler Inhale 2 puffs into the lungs once daily. Controller 4 g 11    [DISCONTINUED] allopurinoL (ZYLOPRIM) 100 MG tablet Take 1 tablet (100 mg total) by mouth once daily. 30 tablet 6     No current facility-administered medications on file prior to visit.

## 2025-05-25 NOTE — PATIENT INSTRUCTIONS

## 2025-06-23 RX ORDER — ALBUTEROL SULFATE 90 UG/1
2 INHALANT RESPIRATORY (INHALATION) EVERY 6 HOURS PRN
Qty: 54 G | Refills: 0 | OUTPATIENT
Start: 2025-06-23

## 2025-08-18 ENCOUNTER — TELEPHONE (OUTPATIENT)
Dept: URGENT CARE | Facility: CLINIC | Age: 55
End: 2025-08-18
Payer: COMMERCIAL

## 2025-08-18 ENCOUNTER — OFFICE VISIT (OUTPATIENT)
Dept: URGENT CARE | Facility: CLINIC | Age: 55
End: 2025-08-18
Payer: COMMERCIAL

## 2025-08-18 VITALS
TEMPERATURE: 98 F | BODY MASS INDEX: 25.18 KG/M2 | SYSTOLIC BLOOD PRESSURE: 127 MMHG | HEART RATE: 110 BPM | WEIGHT: 170 LBS | RESPIRATION RATE: 18 BRPM | OXYGEN SATURATION: 97 % | DIASTOLIC BLOOD PRESSURE: 86 MMHG | HEIGHT: 69 IN

## 2025-08-18 DIAGNOSIS — J44.9 CHRONIC OBSTRUCTIVE PULMONARY DISEASE, UNSPECIFIED COPD TYPE: Primary | ICD-10-CM

## 2025-08-18 DIAGNOSIS — R05.9 COUGH, UNSPECIFIED TYPE: ICD-10-CM

## 2025-08-18 DIAGNOSIS — F17.200 CURRENT EVERY DAY SMOKER: ICD-10-CM

## 2025-08-18 LAB
CTP QC/QA: YES
SARS-COV+SARS-COV-2 AG RESP QL IA.RAPID: NEGATIVE

## 2025-08-18 PROCEDURE — 87811 SARS-COV-2 COVID19 W/OPTIC: CPT | Mod: QW,S$GLB,, | Performed by: NURSE PRACTITIONER

## 2025-08-18 PROCEDURE — 71046 X-RAY EXAM CHEST 2 VIEWS: CPT | Mod: S$GLB,,, | Performed by: RADIOLOGY

## 2025-08-18 PROCEDURE — 99214 OFFICE O/P EST MOD 30 MIN: CPT | Mod: S$GLB,,, | Performed by: NURSE PRACTITIONER

## 2025-08-18 RX ORDER — BUDESONIDE AND FORMOTEROL FUMARATE DIHYDRATE 80; 4.5 UG/1; UG/1
2 AEROSOL RESPIRATORY (INHALATION) 2 TIMES DAILY
Qty: 10.2 G | Refills: 0 | Status: SHIPPED | OUTPATIENT
Start: 2025-08-18

## 2025-08-18 RX ORDER — ALBUTEROL SULFATE 90 UG/1
2 INHALANT RESPIRATORY (INHALATION) EVERY 6 HOURS PRN
Qty: 18 G | Refills: 0 | Status: SHIPPED | OUTPATIENT
Start: 2025-08-18

## 2025-08-18 RX ORDER — PREDNISONE 20 MG/1
20 TABLET ORAL DAILY
Qty: 5 TABLET | Refills: 0 | Status: SHIPPED | OUTPATIENT
Start: 2025-08-18 | End: 2025-08-23

## (undated) DEVICE — APPLICATOR CHLORAPREP ORN 26ML

## (undated) DEVICE — TOURNIQUET SB QC DP 34X4IN

## (undated) DEVICE — DRESSING AQUACEL AG 3.5X10IN

## (undated) DEVICE — BANDAGE ACE ELASTIC 6"

## (undated) DEVICE — SPONGE DERMACEA 4X4IN 12PLY

## (undated) DEVICE — CATH SUCTION 10FR

## (undated) DEVICE — PAD CAST SPECIALIST STRL 6

## (undated) DEVICE — GAUZE SPONGE 4X4 12PLY

## (undated) DEVICE — DRAPE C ARM 42 X 120 10/BX

## (undated) DEVICE — BIT BRILL 2.5

## (undated) DEVICE — TRAY MINOR ORTHO

## (undated) DEVICE — DRAPE STERI U-SHAPED 47X51IN

## (undated) DEVICE — SEE MEDLINE ITEM 152622

## (undated) DEVICE — BANDAGE ESMARK 6X12

## (undated) DEVICE — SEE MEDLINE ITEM 152515

## (undated) DEVICE — ELECTRODE REM PLYHSV RETURN 9

## (undated) DEVICE — SUT 3-0 VICRYL SH CR/8 18

## (undated) DEVICE — SUT ETHILON 3/0 18IN PS-1

## (undated) DEVICE — IMPLANTABLE DEVICE
Type: IMPLANTABLE DEVICE | Site: ANKLE | Status: NON-FUNCTIONAL
Removed: 2018-04-06

## (undated) DEVICE — SEE MEDLINE ITEM 157150

## (undated) DEVICE — SUT VICRYL PLUS 0 CT1 18IN

## (undated) DEVICE — BLADE SURG CARBON STEEL #10

## (undated) DEVICE — PAD CAST SPECIALIST STRL 4

## (undated) DEVICE — BIT DRILL CALIB QC 2.5X230MM

## (undated) DEVICE — SEE MEDLINE ITEM 146298

## (undated) DEVICE — SEE MEDLINE ITEM 152529

## (undated) DEVICE — TAPE SURG DURAPORE 2 X10YD

## (undated) DEVICE — DRAPE PLASTIC U 60X72

## (undated) DEVICE — DRESSING ADAPTIC TOUCH 3X4

## (undated) DEVICE — DRAPE C-ARMOR EQUIPMENT COVER

## (undated) DEVICE — DRESSING N ADH OIL EMUL 3X8